# Patient Record
Sex: FEMALE | Race: WHITE | NOT HISPANIC OR LATINO | Employment: FULL TIME | URBAN - METROPOLITAN AREA
[De-identification: names, ages, dates, MRNs, and addresses within clinical notes are randomized per-mention and may not be internally consistent; named-entity substitution may affect disease eponyms.]

---

## 2017-05-26 ENCOUNTER — CONVERSION ENCOUNTER (OUTPATIENT)
Dept: MAMMOGRAPHY | Facility: CLINIC | Age: 47
End: 2017-05-26

## 2017-08-02 ENCOUNTER — ALLSCRIPTS OFFICE VISIT (OUTPATIENT)
Dept: OTHER | Facility: OTHER | Age: 47
End: 2017-08-02

## 2017-10-03 ENCOUNTER — GENERIC CONVERSION - ENCOUNTER (OUTPATIENT)
Dept: OTHER | Facility: OTHER | Age: 47
End: 2017-10-03

## 2017-12-07 ENCOUNTER — ALLSCRIPTS OFFICE VISIT (OUTPATIENT)
Dept: OTHER | Facility: OTHER | Age: 47
End: 2017-12-07

## 2017-12-10 NOTE — PROGRESS NOTES
Assessment    1  Irritable bowel syndrome with both constipation and diarrhea (564 1) (K58 2)   2  Generalized abdominal pain (789 07) (R10 84)   3  Abdominal pain, LLQ (left lower quadrant) (789 04) (R10 32)   4  Dysphagia (787 20) (R13 10)    Plan  Abdominal pain, LLQ (left lower quadrant), Dysphagia, Generalized abdominal pain,Irritable bowel syndrome with both constipation and diarrhea    · Xifaxan 550 MG Oral Tablet; TAKE 1 TABLET 3 times daily   Rx By: Loni Garg; Dispense: 14 Days ; #:42 Tablet; Refill: 1;Abdominal pain, LLQ (left lower quadrant), Dysphagia, Generalized abdominal pain, Irritable bowel syndrome with both constipation and diarrhea; MAGED = N; Sent To: [facility]/PHARMACY #[id number]   · Follow-up visit in 2 months Evaluation and Treatment  Follow-up  Status: Hold For -Scheduling  Requested for: [id number]   Ordered; Abdominal pain, LLQ (left lower quadrant), Dysphagia, Generalized abdominal pain, Irritable bowel syndrome with both constipation and diarrhea; Ordered By: Loni Garg Performed:  Due: 51LCI9552    Discussion/Summary  Discussion Summary:   She clearly has diarrhea predominant IBS, and likely also has adhesions given the difficulty with her colonoscopy in 7/2015  Also in the differential are interstitial cystitis, and endometriosis  I will continue to aggressively treat her diarrhea predominant IBS with imodium and dicyclomine as needed  I also started her on a low FODMAP diet  I gave her a prescription for rifaximin and may try Levora Nery if she remains symptomatic   asked her to call me if she develops any worsening of her symptoms or alarm symptoms, such as weight loss, change in bowel habits, bleeding, or anemia  Chief Complaint  Chief Complaint Free Text Note Form: Pt follow up for abdominal pain and diarrhea  Chief Complaint Chronic Condition [facility]: Patient is here today for follow up of chronic conditions described in HPI        History of Present Illness  HPI: She has had abdominal pain and intermittent diarrhea and constipation since age 10 and was diagnosed with IBS  She then had a cholecystectomy approx 11 years ago and she had a colonoscopy in July 2015 that was notable for being a very difficult procedure likely due to adhesions by Dr Eve Riley  She had severe pain during and after the colonoscopy, but her pain got better one day later  She has also had EGD and ERCP in the past but she believes they were both unremarkable  She has also had an unremarkable US and CT recently  her last visit, her reflux has been well controlled with dietary changes and she hasn't had to take any more Pepcid  She also complains of intermittent left lower quadrant pain associated with changes in her bowels  She continues to have 3-5 soft formed BM's per day  She feels the pain is worse at specific times her menstrual period  She's been taking probiotics, dicyclomine, imodium, but continues to have symptoms, but she has not had any bleeding or weight loss  History Reviewed: The history was obtained today from the patient and I agree with the documented history  Review of Systems  Complete-Female GI Adult:  Constitutional: No fever, no chills, feels well, no tiredness, no recent weight gain or weight loss  Eyes: No complaints of eye pain, no red eyes, no eyesight problems, no discharge, no dry eyes, no itching of eyes  ENT: no complaints of earache, no loss of hearing, no nose bleeds, no nasal discharge, no sore throat, no hoarseness  Cardiovascular: No complaints of slow heart rate, no fast heart rate, no chest pain, no palpitations, no leg claudication, no lower extremity edema  Respiratory: No complaints of shortness of breath, no wheezing, no cough, no SOB on exertion, no orthopnea, no PND  Gastrointestinal: abdominal pain-- and-- nausea, but-- no vomiting,-- no constipation,-- no diarrhea-- and-- no blood in stools    Genitourinary: No complaints of dysuria, no incontinence, no pelvic pain, no dysmenorrhea, no vaginal discharge or bleeding  Musculoskeletal: No complaints of arthralgias, no myalgias, no joint swelling or stiffness, no limb pain or swelling  Integumentary: No complaints of skin rash or lesions, no itching, no skin wounds, no breast pain or lump  Neurological: No complaints of headache, no confusion, no convulsions, no numbness, no dizziness or fainting, no tingling, no limb weakness, no difficulty walking  Psychiatric: Not suicidal, no sleep disturbance, no anxiety or depression, no change in personality, no emotional problems  Endocrine: No complaints of proptosis, no hot flashes, no muscle weakness, no deepening of the voice, no feelings of weakness  Hematologic/Lymphatic: No complaints of swollen glands, no swollen glands in the neck, does not bleed easily, does not bruise easily  ROS Reviewed:   ROS reviewed  Active Problems  1  Abdominal pain, LLQ (left lower quadrant) (789 04) (R10 32)   2  Candidiasis (112 9) (B37 9)   3  Depression (311) (F32 9)   4  Dysmenorrhea (625 3) (N94 6)   5  Dysphagia (787 20) (R13 10)   6  Edema (782 3) (R60 9)   7  Generalized abdominal pain (789 07) (R10 84)   8  History of anemia (V12 3) (Z86 2)   9  Irritable bowel syndrome with both constipation and diarrhea (564 1) (K58 2)   10  Urinary tract infection (599 0) (N39 0)    Past Medical History  1  History of Bacterial vaginosis (616 10,041 9) (N76 0,B96 89)  Active Problems And Past Medical History Reviewed: The active problems and past medical history were reviewed and updated today  Surgical History  1  History of Breast Surgery   2  History of Knee Surgery   3  History of Shoulder Surgery   4  History of Tonsillectomy  Surgical History Reviewed: The surgical history was reviewed and updated today  Family History  Family History    1  Family history of anemia (V18 2) (Z83 2)   2  Family history of cardiac disorder (V17 49) (Z82 49)   3   Family history of essential hypertension (V17 49) (Z82 49)   4  Family history of hypercholesterolemia (V18 19) (Z83 42)   5  Family history of hypertension (V17 49) (Z82 49)  Family History Reviewed: The family history was reviewed and updated today  Social History     · Denied: History of Drug use   · Never smoker   · Social alcohol use (Z78 9)  Social History Reviewed: The social history was reviewed and updated today  Current Meds   1  Aleve 220 MG Oral Capsule; Therapy: 47PMI5145 to Recorded   2  Atenolol 50 MG Oral Tablet; Therapy: 21YSW5719 to Recorded   3  ClonazePAM 0 5 MG Oral Tablet; Therapy: 79EBV2074 to (Last [facility])  Requested for: 72HEX5053 Ordered   4  Dicyclomine HCl - 10 MG Oral Capsule; TAKE 1 CAPSULE EVERY 6 HOURS DAILY; Therapy: 36Ffs6752 to (Evaluate:30Nov2017)  Requested for: 38Egb3730; Last Rx:63Mko8941 Ordered   5  Naproxen 500 MG Oral Tablet; Therapy: 83IPE9920 to Recorded   6  Probiotic Oral Capsule; Therapy: 69TYC1354 to Recorded   7  Spironolactone 100 MG Oral Tablet; Therapy: 52IHN6949 to Recorded  Medication List Reviewed: The medication list was reviewed and updated today  Allergies  1  Morphine Derivatives   2  Vicodin ES TABS    Vitals  Vital Signs    Recorded: 83FES8863 12:36PM   Temperature 98 1 F, Tympanic   Heart Rate 62   Systolic 137, LUE, Sitting   Diastolic 74, LUE, Sitting   Height 5 ft 6 in   Weight 143 lb 4 oz   BMI Calculated 23 12   BSA Calculated 1 74   O2 Saturation 96, RA       Physical Exam   Constitutional  General appearance: No acute distress, well appearing and well nourished  Eyes  Conjunctiva and lids: No swelling, erythema or discharge  Pupils and irises: Equal, round and reactive to light  Ears, Nose, Mouth, and Throat  Oropharynx: Normal with no erythema, edema, exudate or lesions  Pulmonary  Respiratory effort: No increased work of breathing or signs of respiratory distress  Auscultation of lungs: Clear to auscultation     Cardiovascular Auscultation of heart: Normal rate and rhythm, normal S1 and S2, without murmurs  Examination of extremities for edema and/or varicosities: Normal    Abdomen  Abdomen: Abnormal  -- mild tenderness left side at level of umbilicus  Liver and spleen: No hepatomegaly or splenomegaly  Lymphatic  Palpation of lymph nodes in neck: No lymphadenopathy  Musculoskeletal  Gait and station: Normal    Skin  Skin and subcutaneous tissue: Normal without rashes or lesions           Signatures   Electronically signed by : Poonam Hernandez MD; Dec  9 2017  5:31PM EST                       (Author)

## 2018-01-10 NOTE — MISCELLANEOUS
Message  GI Reminder Recall [facility]:   Date: 10/03/2017   Dear Robert Vasquez:     Review of our records shows you are due for the following: Follow Up Visit  Please call the following office to schedule your appointment:   [address], [address], [place], [place], [address] [facility] [phone]  We look forward to hearing from you! Sincerely,     [address]      Signatures   Electronically signed by :  Patrice Garcia, ; Oct  3 2017  3:27PM EST                       (Author)

## 2018-01-12 ENCOUNTER — GENERIC CONVERSION - ENCOUNTER (OUTPATIENT)
Dept: OTHER | Facility: OTHER | Age: 48
End: 2018-01-12

## 2018-01-13 VITALS
TEMPERATURE: 98.4 F | BODY MASS INDEX: 23.14 KG/M2 | SYSTOLIC BLOOD PRESSURE: 100 MMHG | HEART RATE: 61 BPM | HEIGHT: 66 IN | OXYGEN SATURATION: 98 % | WEIGHT: 144 LBS | DIASTOLIC BLOOD PRESSURE: 60 MMHG

## 2018-01-14 NOTE — MISCELLANEOUS
Provider Comments  Provider Comments:   Dear Keaton Eckert,     You missed your follow up appointment with Dr Huan Hernandez on 11/28/2016; please contact our office to reschedule at [phone]  We understand that many situations arise that occasionally prevent patients from keeping scheduled appointments  It is the policy of [facility] that patients notify us 24 hours in advance if unable to keep a scheduled appointment  Missed appointments jeopardize strong physician-patient relationships  The appointment you missed could have easily been made available to another patient if you had contacted us to cancel  We like to accommodate all of our patients, but when patients miss an appointment it prevents us from being able to help everyone  In the future, we request at least 24 hours' notice of cancellation so we can make your appointment available to someone else in need       Sincerely,  The Physicians and Staff at [facility]          Signatures   Electronically signed by : Walker Matute, ; Nov 28 2016  4:33PM EST                       (Author)

## 2018-01-23 VITALS
HEIGHT: 66 IN | WEIGHT: 143.25 LBS | OXYGEN SATURATION: 96 % | TEMPERATURE: 98.1 F | SYSTOLIC BLOOD PRESSURE: 124 MMHG | BODY MASS INDEX: 23.02 KG/M2 | HEART RATE: 62 BPM | DIASTOLIC BLOOD PRESSURE: 74 MMHG

## 2018-02-26 NOTE — MISCELLANEOUS
Message  GI Reminder Recall [employer]:   Date: 01/12/2018   Dear Gloria Huggins:     Review of our records shows you are due for the following: Follow Up Visit  Please call the following office to schedule your appointment:   [address] [facility], [address] [place] [address], [address] [place] [address] [phone]  We look forward to hearing from you! Sincerely,     [facility]      Signatures   Electronically signed by :  Salazar Atkinson, ; Jan 12 2018  2:18PM EST                       (Author)

## 2020-12-03 ENCOUNTER — OFFICE VISIT (OUTPATIENT)
Dept: OBGYN CLINIC | Facility: OTHER | Age: 50
End: 2020-12-03
Payer: COMMERCIAL

## 2020-12-03 VITALS
WEIGHT: 145 LBS | HEIGHT: 66 IN | SYSTOLIC BLOOD PRESSURE: 110 MMHG | HEART RATE: 68 BPM | DIASTOLIC BLOOD PRESSURE: 75 MMHG | BODY MASS INDEX: 23.3 KG/M2

## 2020-12-03 DIAGNOSIS — S46.119A SUBLUXATION OF TENDON OF LONG HEAD OF BICEPS: Primary | ICD-10-CM

## 2020-12-03 DIAGNOSIS — M75.20 TENDINITIS OF LONG HEAD OF BICEPS: ICD-10-CM

## 2020-12-03 PROBLEM — S43.003A SUBLUXATION OF TENDON OF LONG HEAD OF BICEPS: Status: ACTIVE | Noted: 2020-12-03

## 2020-12-03 PROCEDURE — 99203 OFFICE O/P NEW LOW 30 MIN: CPT | Performed by: ORTHOPAEDIC SURGERY

## 2020-12-03 RX ORDER — ATENOLOL 25 MG/1
12.5 TABLET ORAL
COMMUNITY
Start: 2020-11-08

## 2020-12-03 RX ORDER — DIPHENOXYLATE HYDROCHLORIDE AND ATROPINE SULFATE 2.5; .025 MG/1; MG/1
1 TABLET ORAL DAILY
COMMUNITY

## 2021-01-04 ENCOUNTER — ANESTHESIA EVENT (OUTPATIENT)
Dept: PERIOP | Facility: AMBULARY SURGERY CENTER | Age: 51
End: 2021-01-04
Payer: COMMERCIAL

## 2021-01-14 PROBLEM — F41.9 ANXIETY: Status: ACTIVE | Noted: 2021-01-14

## 2021-01-14 PROBLEM — I48.91 ATRIAL FIBRILLATION (HCC): Status: ACTIVE | Noted: 2021-01-14

## 2021-01-14 PROBLEM — R11.2 PONV (POSTOPERATIVE NAUSEA AND VOMITING): Status: ACTIVE | Noted: 2021-01-14

## 2021-01-14 PROBLEM — E78.5 HYPERLIPIDEMIA: Status: ACTIVE | Noted: 2021-01-14

## 2021-01-14 PROBLEM — Z98.890 PONV (POSTOPERATIVE NAUSEA AND VOMITING): Status: ACTIVE | Noted: 2021-01-14

## 2021-01-14 PROBLEM — I47.10 SVT (SUPRAVENTRICULAR TACHYCARDIA): Status: ACTIVE | Noted: 2021-01-14

## 2021-01-14 PROBLEM — I47.1 SVT (SUPRAVENTRICULAR TACHYCARDIA) (HCC): Status: ACTIVE | Noted: 2021-01-14

## 2021-01-14 PROBLEM — K21.9 GASTROESOPHAGEAL REFLUX DISEASE: Status: ACTIVE | Noted: 2021-01-14

## 2021-01-14 RX ORDER — PANTOPRAZOLE SODIUM 40 MG/1
40 TABLET, DELAYED RELEASE ORAL
COMMUNITY

## 2021-01-14 RX ORDER — CLONAZEPAM 0.5 MG/1
0.5 TABLET ORAL AS NEEDED
COMMUNITY

## 2021-01-14 NOTE — ANESTHESIA PREPROCEDURE EVALUATION
Procedure:  SHOULDER ARTHROSCOPY WITH ARTHROSCOPIC BICEPS TENODESIS, POSSIBLE REPAIR VERSUS DEBRIDEMENT OF ROTATOR CUFF BASED ON INTRAOPERATIVE FINDINGS (Right Shoulder)    Relevant Problems   ANESTHESIA   (+) PONV (postoperative nausea and vomiting)      CARDIO   (+) Atrial fibrillation (HCC)   (+) Hyperlipidemia   (+) SVT (supraventricular tachycardia) (HCC)   (-) Chest pain   (-) KEITA (dyspnea on exertion)      GI/HEPATIC   (+) Gastroesophageal reflux disease (well controlled)      /RENAL (within normal limits)      NEURO/PSYCH   (+) Anxiety      PULMONARY   (-) Shortness of breath   (-) Smoking   (-) URI (upper respiratory infection)      Stress echo 2019:   CONCLUSIONS    Negative exercise ECG for ischemia      Negative exercise stress echocardiography for ischemia      Patient complained of palpitations while having pacs and pvcs during the test and in recovery  Estimated functional capacity for age is normal at 12 8 METS     0/78/28 LUCILA: Systolic function is normal with an ejection fraction of 55-65%  Systolic function is normal     Most recent EKG per cardiology note is sinus mark  Physical Exam    Airway    Mallampati score: I  TM Distance: >3 FB  Neck ROM: full     Dental   No notable dental hx     Cardiovascular      Pulmonary      Other Findings        Anesthesia Plan  ASA Score- 2     Anesthesia Type- general and regional with ASA Monitors  Additional Monitors:   Airway Plan: LMA  Plan Factors-Exercise tolerance (METS): >4 METS  Chart reviewed  EKG reviewed  Induction- intravenous  Postoperative Plan-     Informed Consent- Anesthetic plan and risks discussed with patient  I personally reviewed this patient with the CRNA  Discussed and agreed on the Anesthesia Plan with the CRNA  Kyra Press Appropriately NPO, took night time atenolol, well controlled GERD, IS block preop discussed risks of bleeding/infection/nerve injury/SOB/[place]'s    GA with LMA

## 2021-01-14 NOTE — PRE-PROCEDURE INSTRUCTIONS
Pre-Surgery Instructions:   Medication Instructions    Ascorbic Acid (VITAMIN C PO) Instructed patient per Anesthesia Guidelines   atenolol (TENORMIN) 25 mg tablet Instructed to take per normal schedule    CALCIUM PO Instructed patient per Anesthesia Guidelines   clonazePAM (KlonoPIN) 0 5 mg tablet Instructed to take as needed including DOS    ELDERBERRY PO Instructed patient per Anesthesia Guidelines   multivitamin (THERAGRAN) TABS Instructed patient per Anesthesia Guidelines   pantoprazole (PROTONIX) 40 mg tablet Instructed to take per normal schedule including DOS   VITAMIN D, CHOLECALCIFEROL, PO Instructed patient per Anesthesia Guidelines  Pre op instructions per Troy Valdez protocol, medications per anesthesia guidelines and showering instructions per Troy Valdez protocol reviewed  Patient getting hibiclens  Instructed to bring brace DOS  Pt  Verbalized understanding of current visitor restrictions  Instructed to avoid all ASA/NSAIDs and OTC Vit/Supp from now until after surgery  Pt  Verbalized an understanding of all instructions reviewed and offers no concerns at this time

## 2021-01-15 ENCOUNTER — TELEPHONE (OUTPATIENT)
Dept: OTHER | Facility: OTHER | Age: 51
End: 2021-01-15

## 2021-01-15 ENCOUNTER — HOSPITAL ENCOUNTER (OUTPATIENT)
Facility: AMBULARY SURGERY CENTER | Age: 51
Setting detail: OUTPATIENT SURGERY
Discharge: HOME | End: 2021-01-15
Attending: ORTHOPAEDIC SURGERY | Admitting: ORTHOPAEDIC SURGERY
Payer: COMMERCIAL

## 2021-01-15 ENCOUNTER — ANESTHESIA (OUTPATIENT)
Dept: PERIOP | Facility: AMBULARY SURGERY CENTER | Age: 51
End: 2021-01-15
Payer: COMMERCIAL

## 2021-01-15 ENCOUNTER — TELEPHONE (OUTPATIENT)
Dept: OBGYN CLINIC | Facility: HOSPITAL | Age: 51
End: 2021-01-15

## 2021-01-15 VITALS — HEART RATE: 78 BPM

## 2021-01-15 VITALS
BODY MASS INDEX: 22.5 KG/M2 | SYSTOLIC BLOOD PRESSURE: 133 MMHG | WEIGHT: 140 LBS | RESPIRATION RATE: 18 BRPM | OXYGEN SATURATION: 97 % | DIASTOLIC BLOOD PRESSURE: 88 MMHG | TEMPERATURE: 97.9 F | HEIGHT: 66 IN | HEART RATE: 67 BPM

## 2021-01-15 DIAGNOSIS — M75.20 TENDINITIS OF LONG HEAD OF BICEPS: Primary | ICD-10-CM

## 2021-01-15 DIAGNOSIS — S46.119A SUBLUXATION OF TENDON OF LONG HEAD OF BICEPS: Primary | ICD-10-CM

## 2021-01-15 PROCEDURE — C9290 INJ, BUPIVACAINE LIPOSOME: HCPCS | Performed by: INTERNAL MEDICINE

## 2021-01-15 PROCEDURE — 29828 SHO ARTHRS SRG BICP TENODSIS: CPT | Performed by: ORTHOPAEDIC SURGERY

## 2021-01-15 PROCEDURE — C1713 ANCHOR/SCREW BN/BN,TIS/BN: HCPCS | Performed by: ORTHOPAEDIC SURGERY

## 2021-01-15 PROCEDURE — 29806 SHO ARTHRS SRG CAPSULORRAPHY: CPT | Performed by: PHYSICIAN ASSISTANT

## 2021-01-15 PROCEDURE — 29826 SHO ARTHRS SRG DECOMPRESSION: CPT | Performed by: PHYSICIAN ASSISTANT

## 2021-01-15 PROCEDURE — 29827 SHO ARTHRS SRG RT8TR CUF RPR: CPT | Performed by: ORTHOPAEDIC SURGERY

## 2021-01-15 PROCEDURE — 29828 SHO ARTHRS SRG BICP TENODSIS: CPT | Performed by: PHYSICIAN ASSISTANT

## 2021-01-15 PROCEDURE — 29806 SHO ARTHRS SRG CAPSULORRAPHY: CPT | Performed by: ORTHOPAEDIC SURGERY

## 2021-01-15 PROCEDURE — NC001 PR NO CHARGE: Performed by: ORTHOPAEDIC SURGERY

## 2021-01-15 PROCEDURE — 29826 SHO ARTHRS SRG DECOMPRESSION: CPT | Performed by: ORTHOPAEDIC SURGERY

## 2021-01-15 PROCEDURE — 29827 SHO ARTHRS SRG RT8TR CUF RPR: CPT | Performed by: PHYSICIAN ASSISTANT

## 2021-01-15 DEVICE — ANCHOR GRYPHON W ORTHOCORD 210813: Type: IMPLANTABLE DEVICE | Site: SHOULDER | Status: FUNCTIONAL

## 2021-01-15 DEVICE — DEPUY MITEK HEALIX ADVANCE 4.5 BR: Type: IMPLANTABLE DEVICE | Site: SHOULDER | Status: FUNCTIONAL

## 2021-01-15 RX ORDER — PROPOFOL 10 MG/ML
INJECTION, EMULSION INTRAVENOUS AS NEEDED
Status: DISCONTINUED | OUTPATIENT
Start: 2021-01-15 | End: 2021-01-15

## 2021-01-15 RX ORDER — MIDAZOLAM HYDROCHLORIDE 2 MG/2ML
INJECTION, SOLUTION INTRAMUSCULAR; INTRAVENOUS AS NEEDED
Status: DISCONTINUED | OUTPATIENT
Start: 2021-01-15 | End: 2021-01-15

## 2021-01-15 RX ORDER — LIDOCAINE HYDROCHLORIDE 10 MG/ML
0.5 INJECTION, SOLUTION EPIDURAL; INFILTRATION; INTRACAUDAL; PERINEURAL ONCE AS NEEDED
Status: DISCONTINUED | OUTPATIENT
Start: 2021-01-15 | End: 2021-01-15 | Stop reason: HOSPADM

## 2021-01-15 RX ORDER — ONDANSETRON 2 MG/ML
4 INJECTION INTRAMUSCULAR; INTRAVENOUS ONCE AS NEEDED
Status: DISCONTINUED | OUTPATIENT
Start: 2021-01-15 | End: 2021-01-15 | Stop reason: HOSPADM

## 2021-01-15 RX ORDER — ONDANSETRON 4 MG/1
4 TABLET, FILM COATED ORAL EVERY 8 HOURS PRN
Qty: 20 TABLET | Refills: 0 | Status: SHIPPED | OUTPATIENT
Start: 2021-01-15

## 2021-01-15 RX ORDER — ACETAMINOPHEN 325 MG/1
650 TABLET ORAL EVERY 6 HOURS PRN
Status: DISCONTINUED | OUTPATIENT
Start: 2021-01-15 | End: 2021-01-15 | Stop reason: HOSPADM

## 2021-01-15 RX ORDER — IBUPROFEN 800 MG/1
800 TABLET ORAL EVERY 8 HOURS PRN
Qty: 21 TABLET | Refills: 0 | Status: SHIPPED | OUTPATIENT
Start: 2021-01-15

## 2021-01-15 RX ORDER — SODIUM CHLORIDE, SODIUM LACTATE, POTASSIUM CHLORIDE, CALCIUM CHLORIDE 600; 310; 30; 20 MG/100ML; MG/100ML; MG/100ML; MG/100ML
125 INJECTION, SOLUTION INTRAVENOUS CONTINUOUS
Status: DISCONTINUED | OUTPATIENT
Start: 2021-01-15 | End: 2021-01-15 | Stop reason: HOSPADM

## 2021-01-15 RX ORDER — CEFAZOLIN SODIUM 2 G/50ML
2000 SOLUTION INTRAVENOUS ONCE
Status: COMPLETED | OUTPATIENT
Start: 2021-01-15 | End: 2021-01-15

## 2021-01-15 RX ORDER — OXYCODONE HYDROCHLORIDE 5 MG/1
TABLET ORAL
Qty: 13 TABLET | Refills: 0 | Status: SHIPPED | OUTPATIENT
Start: 2021-01-15 | End: 2021-01-15

## 2021-01-15 RX ORDER — DIPHENHYDRAMINE HYDROCHLORIDE 50 MG/ML
INJECTION INTRAMUSCULAR; INTRAVENOUS AS NEEDED
Status: DISCONTINUED | OUTPATIENT
Start: 2021-01-15 | End: 2021-01-15

## 2021-01-15 RX ORDER — BUPIVACAINE HYDROCHLORIDE 5 MG/ML
INJECTION, SOLUTION PERINEURAL AS NEEDED
Status: DISCONTINUED | OUTPATIENT
Start: 2021-01-15 | End: 2021-01-15

## 2021-01-15 RX ORDER — FENTANYL CITRATE/PF 50 MCG/ML
25 SYRINGE (ML) INJECTION
Status: COMPLETED | OUTPATIENT
Start: 2021-01-15 | End: 2021-01-15

## 2021-01-15 RX ORDER — COVID-19 ANTIGEN TEST
1 KIT MISCELLANEOUS 2 TIMES DAILY PRN
COMMUNITY
End: 2021-01-15

## 2021-01-15 RX ORDER — OXYCODONE HYDROCHLORIDE 5 MG/1
5 TABLET ORAL EVERY 4 HOURS PRN
Status: DISCONTINUED | OUTPATIENT
Start: 2021-01-15 | End: 2021-01-15 | Stop reason: HOSPADM

## 2021-01-15 RX ORDER — LIDOCAINE HYDROCHLORIDE 10 MG/ML
INJECTION, SOLUTION EPIDURAL; INFILTRATION; INTRACAUDAL; PERINEURAL AS NEEDED
Status: DISCONTINUED | OUTPATIENT
Start: 2021-01-15 | End: 2021-01-15

## 2021-01-15 RX ORDER — ONDANSETRON 2 MG/ML
INJECTION INTRAMUSCULAR; INTRAVENOUS AS NEEDED
Status: DISCONTINUED | OUTPATIENT
Start: 2021-01-15 | End: 2021-01-15

## 2021-01-15 RX ORDER — EPHEDRINE SULFATE 50 MG/ML
INJECTION INTRAVENOUS AS NEEDED
Status: DISCONTINUED | OUTPATIENT
Start: 2021-01-15 | End: 2021-01-15

## 2021-01-15 RX ORDER — ONDANSETRON 2 MG/ML
4 INJECTION INTRAMUSCULAR; INTRAVENOUS EVERY 6 HOURS PRN
Status: DISCONTINUED | OUTPATIENT
Start: 2021-01-15 | End: 2021-01-15 | Stop reason: HOSPADM

## 2021-01-15 RX ADMIN — SODIUM CHLORIDE, SODIUM LACTATE, POTASSIUM CHLORIDE, AND CALCIUM CHLORIDE: .6; .31; .03; .02 INJECTION, SOLUTION INTRAVENOUS at 08:42

## 2021-01-15 RX ADMIN — EPHEDRINE SULFATE 5 MG: 50 INJECTION, SOLUTION INTRAVENOUS at 07:54

## 2021-01-15 RX ADMIN — PROPOFOL 160 MG: 10 INJECTION, EMULSION INTRAVENOUS at 07:43

## 2021-01-15 RX ADMIN — FENTANYL CITRATE 25 MCG: 50 INJECTION INTRAMUSCULAR; INTRAVENOUS at 09:06

## 2021-01-15 RX ADMIN — BUPIVACAINE 20 ML: 13.3 INJECTION, SUSPENSION, LIPOSOMAL INFILTRATION at 07:22

## 2021-01-15 RX ADMIN — EPHEDRINE SULFATE 5 MG: 50 INJECTION, SOLUTION INTRAVENOUS at 08:10

## 2021-01-15 RX ADMIN — PHENYLEPHRINE HYDROCHLORIDE 50 MCG: 10 INJECTION INTRAVENOUS at 08:14

## 2021-01-15 RX ADMIN — EPHEDRINE SULFATE 5 MG: 50 INJECTION, SOLUTION INTRAVENOUS at 08:04

## 2021-01-15 RX ADMIN — CEFAZOLIN SODIUM 1000 MG: 2 SOLUTION INTRAVENOUS at 07:40

## 2021-01-15 RX ADMIN — FENTANYL CITRATE 25 MCG: 50 INJECTION INTRAMUSCULAR; INTRAVENOUS at 09:12

## 2021-01-15 RX ADMIN — PHENYLEPHRINE HYDROCHLORIDE 50 MCG: 10 INJECTION INTRAVENOUS at 08:26

## 2021-01-15 RX ADMIN — FENTANYL CITRATE 25 MCG: 50 INJECTION INTRAMUSCULAR; INTRAVENOUS at 09:17

## 2021-01-15 RX ADMIN — BUPIVACAINE HYDROCHLORIDE 6 ML: 5 INJECTION, SOLUTION PERINEURAL at 07:22

## 2021-01-15 RX ADMIN — FENTANYL CITRATE 25 MCG: 50 INJECTION INTRAMUSCULAR; INTRAVENOUS at 09:01

## 2021-01-15 RX ADMIN — SODIUM CHLORIDE, SODIUM LACTATE, POTASSIUM CHLORIDE, AND CALCIUM CHLORIDE: .6; .31; .03; .02 INJECTION, SOLUTION INTRAVENOUS at 07:35

## 2021-01-15 RX ADMIN — DIPHENHYDRAMINE HYDROCHLORIDE 25 MG: 50 INJECTION, SOLUTION INTRAMUSCULAR; INTRAVENOUS at 07:55

## 2021-01-15 RX ADMIN — LIDOCAINE HYDROCHLORIDE 50 MG: 10 INJECTION, SOLUTION EPIDURAL; INFILTRATION; INTRACAUDAL at 07:43

## 2021-01-15 RX ADMIN — ONDANSETRON 4 MG: 2 INJECTION INTRAMUSCULAR; INTRAVENOUS at 07:43

## 2021-01-15 RX ADMIN — MIDAZOLAM HYDROCHLORIDE 2 MG: 1 INJECTION, SOLUTION INTRAMUSCULAR; INTRAVENOUS at 07:21

## 2021-01-15 NOTE — H&P
I identified and marked the patient in the pre-op holding area after confirming the surgical consent  No changes to medical health since the H&P was preformed  The patient's prescription history was queried in the [place] [id number] to ensure compliance with applicable state laws  Assessment  Diagnoses and all orders for this visit:     Subluxation of tendon of long head of biceps, right shoulder     Tendinitis of long head of biceps           Discussion and Plan:     · Explained to the patient that her physical examination is consistent with instability of the long head biceps, which has been refractory to physical therapy/HEP and a cortisone injection up to this point  Her imaging does not show instability of the long-head biceps as this is a dynamic problem  and not a static problem typically seen on imaging  Given her lack of improvement with nonoperative care, the patient was offered an arthroscopic procedure  She was in agreement with this treatment and wished to proceed  She does understand the time of arthroscopy we will evaluate the other structures and repair any other pathology seen although I do not suspect there would be further pathology besides the long-head biceps  · She will be scheduled today for a right shoulder arthroscopic long head biceps tenodesis, possible rotator cuff repair vs debridement  The risks and benefits of the different possible procedures as well as the pre and post operative expectations for these procedures was explained to the patient in detail today in the office  She understood and all questions were answered  · Follow up post operatively with Kunal Laguerre PA-C     Subjective:   Patient ID: Viky Tinajero is a 45 y o  female        The patient presents with a chief complaint of right shoulder pain  The pain began 5 month(s) ago and is not associated with an acute injury    Patient does state that she began to work out and her shoulder began to bother her  The patient describes the pain as aching and dull in intensity,  intermittent, occurring with increasing frequency in timing, and localizes the pain to the  right deltoid, biceps tendon  The pain is worse with overhead work, performing rows, overuse and raising arm over head and relieved by rest, ice, avoiding the painful activities  The pain is not associated with numbness and tingling  The pain is not associated with constitutional symptoms  The patient is not awoken at night by the pain  The patient has had prior treatment with Dr Roberto Lopez in the form of a cortisone injection and formal physical therapy/HEP without benefit  She also mentions a history of an arthroscopic labral debridement in 2009 by Dr Roberto Lopez  Patient had full recovery from this procedure                      The following portions of the patient's history were reviewed and updated as appropriate: allergies, current medications, past family history, past medical history, past social history, past surgical history and problem list      Review of Systems   Constitutional: Negative for chills, fever and unexpected weight change  HENT: Negative for hearing loss, nosebleeds and sore throat  Eyes: Negative for pain, redness and visual disturbance  Respiratory: Negative for cough, shortness of breath and wheezing  Cardiovascular: Negative for chest pain, palpitations and leg swelling  Gastrointestinal: Negative for abdominal distention, nausea and vomiting  Endocrine: Negative for polydipsia and polyuria  Genitourinary: Negative for dysuria and hematuria  Skin: Negative for rash and wound  Neurological: Negative for dizziness, numbness and headaches     Psychiatric/Behavioral: Negative for decreased concentration and suicidal ideas          Objective:  /78   Pulse 68   Temp 98 9 °F (37 2 °C) (Temporal)   Resp 18   Ht 5' 6" (1 676 m)   Wt 63 5 kg (140 lb)   SpO2 97%   BMI 22 60 kg/m²            Right Shoulder Exam      Tenderness   The patient is experiencing no tenderness      Range of Motion   External rotation: 70   Forward flexion: 170   Internal rotation 0 degrees: Lumbar      Muscle Strength   Abduction: 5/5      Tests   Drop arm: negative     Other   Erythema: absent  Sensation: normal  Pulse: present     Comments:  (+) Speed's Test  Subluxation of long head biceps tendon                 Physical Exam  Vitals signs and nursing note reviewed  Constitutional:       Appearance: She is well-developed  HENT:      Head: Normocephalic and atraumatic  Eyes:      Pupils: Pupils are equal, round, and reactive to light  Neck:      Musculoskeletal: Normal range of motion and neck supple  Cardiovascular:      Rate and Rhythm: Normal rate and regular rhythm  Heart sounds: Normal heart sounds  Pulmonary:      Effort: Pulmonary effort is normal  No respiratory distress  Breath sounds: Normal breath sounds  Abdominal:      General: There is no distension  Palpations: Abdomen is soft  Skin:     General: Skin is warm and dry  Neurological:      Mental Status: She is alert and oriented to person, place, and time  Psychiatric:         Behavior: Behavior normal          Thought Content: Thought content normal          Judgment: Judgment normal                I have personally reviewed pertinent films in PACS and my interpretation is as follows      MRI Arthrogram Right Shoulder 10/12/2020:  Distal rotator cuff tendinosis, moderate subacromial bursitis    Fraying of the long head biceps tendon without full-thickness tear

## 2021-01-15 NOTE — TELEPHONE ENCOUNTER
Patient called to state that she had surgery today with Dr Roxana Oglesby and was experiencing some issues and needed to speak with someone regarding her surgery  Staff at [facility] office directed her to speak with staff at [phone] ([phone] ) who requested the patient be connected to them

## 2021-01-15 NOTE — TELEPHONE ENCOUNTER
Patient sees Dr Ruba Bailon  Patient is calling in stating that she had surgery with the Dr this morning to her right shoulder and she was given Oxycodone 5 mgs in which she is not able to take this medication due to her being allergic to this  She is currently experiencing a lot of pain and is asking if she can get another pain medication for her pain [facility] and for a call once this is completed        Call back# [phone]

## 2021-01-15 NOTE — TELEPHONE ENCOUNTER
Patient called again regarding pain medication, her block wore off and she is in extreme pain       She is allergic to Oxycodone, Percocet, Demorol, Morphine     Sent Tiger Text to [facility] on call

## 2021-01-15 NOTE — DISCHARGE INSTRUCTIONS
You are being scheduled for a shoulder arthroscopy to treat your symptoms  Below are some instructions and information on what to expect before and after your surgery  Pre-Surgical Preparation for Arthroscopic Shoulder Surgery: You will be contacted the evening prior to your surgery to confirm the scheduled time of the procedure and when to arrive at the hospital    Do not eat or drink anything after midnight the night before your surgery  Since you are having out-patient surgery, make sure that you have someone who can drive you home later in the day  Also, prepare that person for a long day, as the process of safely preparing for and recovering from the procedure is more time consuming than the actual procedure! As you will be in a sling after surgery, please wear or bring a loose fitting button-down shirt so that you can easily place this over the sling when you leave the surgical suite  This avoids having to place the operative arm in a sleeve  Most patients find that this is the easiest outfit to wear for the first week or so after surgery so you may want to plan accordingly  Most patients find that lying down in bed after shoulder surgery accentuates their discomfort  This is likely related to the effect of gravity on the swelling in the shoulder  As a result, most patients sleep better in a recliner or in bed with pillows propped up behind their back for the first few days or weeks after surgery  It is a good idea to plan for this ahead of time so there will be less hassle getting things set up the night after surgery  What to Expect After Arthroscopic Shoulder Surgery: It is normal to have swelling and discomfort in the shoulder for several days or a week after surgery  It is also normal to have a small amount of drainage from the surgical wounds (especially the first few days after surgery), as we put fluid into the shoulder to visualize the structures during surgery  It is NOT normal to have foul smelling, purulent drainage and if this is noted, please contact the office immediately or proceed to the emergency room for evaluation as this may indicate an infection  Applying ice bags to the shoulder may help with pain that is not controlled by the regional block  Ice should be applied 20-30 minutes at a time, every hour or two  Make sure to put a thin towel or T-shirt next to your skin to avoid direct contact of the ice with the skin  Icing is most helpful in the first 48 hours, although many people find that continuing past this time frame lessens their postoperative pain  Please note that your post-operative dressing is not conductive to ice, so if you need to, it is okay to remove that dressing even the night after surgery and place band-aids over the wounds in order for the ice to take effect  Pain Control    Most patients will receive a nerve block, the local anesthetic may keep your whole arm numb for up to 4 days  You will be given a prescription for narcotic pain medication when you are discharged from the hospital   With the newer nerve block that is being utilized, patients are rarely requiring the use of this narcotic pain medication  If you find you do not tolerate that type of pain medicine well, call our office and we will try another one  In addition to the narcotic pain medication, it is safe to use an anti-inflammatory (unless the patient has a medical condition that would not allow safe use of this mediation)  This includes the Advil, Motrin, Ibuprofen and Alleve category of medications  Simply follow the over the counter dosing on the package and use as indicated as another adjunct  Importantly since these medications are all very similar, use only one of them  Tylenol is a separate medication that can be utilized as well and can be taken at the same time as the other medication or given in a "staggered" manner    Just make sure that you follow the dosing on the over the counter bottle instructions  Also make sure that the pain medication prescribed by Dr [facility] team does not contain acetaminophen (this is found in Percocet and Vicodin)  Typically we do not prescribe those types of pain medications but if for some reason that has been prescribed DO NOT add more Tylenol (acetaminophen) as you could end up taking too much of that medication  As mentioned above, most patients find that lying down accentuates their discomfort  You might sleep better in a recliner, or propped up in bed  Dr Kim Rubi encourages patients to safely ambulate around the house as much as possible in the first few days after the procedure as this can help with blood circulation in the legs  While the incidence of blood clots is very rare following shoulder surgery, early ambulation is a great way to help decrease the already low rate  24 hours after the surgery you may remove the bandage and cover incisions with Band-Aids if needed  At that time you may shower, the wounds will have a surgical glue that will protect them from shower water but do not submerge your incisions directly (bathing or swimming) until at least 2 weeks post-operatively  It is safe to let the arm hang at the side and take a shower and put on a shirt without the sling on  Just make sure that you do not use the operative are to reach out and grab anything as that may damage the repair  When you are done showering and getting dressed please return the operative arm to the sling  Unless noted otherwise in your discharge paperwork, Dr Kim Rubi uses absorbable sutures so they do not need to be removed  Dr Roberto Youngblood physician assistant (PA) will see you in the office a few days after the procedure to review the intra-operative findings and to initiate physical therapy if appropriate    A post-operative appointment should have been scheduled for you already, but if for some reason this did not happen, please call the office to make one  Physical therapy is important after nearly all shoulder surgeries and a detailed rehabilitation plan based on the specific intra-operative findings and procedures will be provided to your therapist at the first post-operative office visit  Most patients have post-operative therapy appointments scheduled pre-operatively, but if you do not, that will be handled at the first post-operative office visit  Unless expressly directed otherwise it is safe to remove the sling even the first day after the surgery and let the arm hang by the side  This allows patients to shower and even put a shirt on (bad arm in the sleeve first)  It is also safe to flex and extend their wrist, hand and fingers as much as possible when the block wears off  These simple motions can serve to pump fluid out of the forearm and decrease swelling in the arm

## 2021-01-15 NOTE — ANESTHESIA PROCEDURE NOTES
Peripheral Block    Patient location during procedure: holding area  Start time: 1/15/2021 7:22 AM  Reason for block: at surgeon's request and post-op pain management  Staffing  Anesthesiologist: Jazlyn North MD  Preanesthetic Checklist  Completed: patient identified, site marked, surgical consent, pre-op evaluation, timeout performed, IV checked, risks and benefits discussed and monitors and equipment checked  Peripheral Block  Patient position: sitting  Prep: ChloraPrep  Patient monitoring: continuous pulse ox, frequent blood pressure checks and heart rate  Block type: interscalene  Laterality: right  Injection technique: single-shot  Procedures: ultrasound guided, Ultrasound guidance required for the procedure to increase accuracy and safety of medication placement and decrease risk of complications    Ultrasound permanent image saved  Needle  Needle type: Stimuplex   Needle gauge: 22 G  Needle length: 5 cm  Needle localization: anatomical landmarks and ultrasound guidance  Test dose: negative  Assessment  Injection assessment: incremental injection, local visualized surrounding nerve on ultrasound, negative aspiration for CSF, negative aspiration for heme and no paresthesia on injection  Paresthesia pain: none  Heart rate change: no  Slow fractionated injection: yes  Post-procedure:  site cleaned  patient tolerated the procedure well with no immediate complications

## 2021-01-15 NOTE — TELEPHONE ENCOUNTER
Spoke with patient  She stats having pain but didn't  oxy b/c makes her very very sick  She believes she's taken vicodin before, but feels like this may have made her sick too  She would like to try ibuprofen 800mg TID with tylenol 650mg ER 4x/day and see if that helps  Ibuprofen sent to pharmacy  Pt will call if this does not adequately control pain and we can try vicodin at that time  Plan was run by Christina Montes

## 2021-01-15 NOTE — ANESTHESIA POSTPROCEDURE EVALUATION
Post-Op Assessment Note    CV Status:  Stable  Pain Score: 0    Pain management: adequate     Mental Status:  Alert and awake   Hydration Status:  Euvolemic   PONV Controlled:  Controlled   Airway Patency:  Patent      Post Op Vitals Reviewed: Yes      Staff: CRNA         No complications documented      BP (P) 107/71 (01/15/21 0848)    Temp (!) (P) 97 2 °F (36 2 °C) (01/15/21 0848)    Pulse (P) 62 (01/15/21 0848)   Resp   15   SpO2   99%

## 2021-01-15 NOTE — OP NOTE
OPERATIVE REPORT  PATIENT NAME: Blayne Lisa    :  1970  MRN: [id number]  Pt Location: [facility] OR [facility]    SURGERY DATE: 1/15/2021     SURGEON: Monika Cota MD     ASSISTANT: Rebecca Infante PA-C     NOTE: Rebecca Infante PA-C was present throughout the entire procedure and performed essential assistance with patient prepping, draping, positioning, suture management, wound closure, sterile dressing application and sling application, all under my direct supervision  NOTE: No qualified resident physician was available for assistance    PREOPERATIVE DIAGNOSIS:  Right Shoulder Long Head Biceps Tendon Subluxation    POSTOPERATIVE DIAGNOSIS: Right Shoulder Supraspinatus Tear, Posterior Glenoid Labrum Tear and Long Head Biceps Tendon Subluxation    PROCEDURES: Surgical Arthroscopy Right Shoulder with Rotator Cuff Repair, Long Head Biceps Tenodesis, Posterior Glenoid Labrum Repair and Subacromial Decompression    ANESTHESIA STAFF: Joseph Camilo MD     ANESTHESIA TYPE: General LMA with ultrasound guided interscalene block (Exparel)  The interscalene block was provided by the anesthesia staff per my request for postoperative pain control and to decrease the use of postoperative narcotic medication for pain control  COMPLICATIONS: None    FINDINGS: Supraspinatus Tear, Posterior Glenoid Labrum Tear and Long Head Biceps Tendon Subluxation    SPECIMEN(S):  None    ESTIMATED BLOOD LOSS: Minimal    INDICATION:  Briefly, the patient is a 45 y o   female with right shoulder pain  MRI arthrogram scan confirmed a suspected partial rotator cuff tear with a clinical exam consistent with long-head biceps tendon instability  The patient elected for arthroscopic treatment after failing to improve with nonoperative care  Informed consent was obtained after a thorough discussion of the risks and benefits of the procedure, as well as alternatives to the procedure       OPERATIVE TECHNIQUE:  On the day of surgery, I identified the patients right shoulder and marked it with my initials  The patient was taken to the operating room where anesthesia was induced and 2 grams of IV Cefazolin were given  The patient was examined in the supine position and was found to have full range of motion of the right shoulder with isolated posterior instability  The posterior instability was not an expected exam finding  The patient was then positioned in the [address] [place] [address] position  All bony prominences were padded  The shoulder was prepped and draped in normal sterile fashion  After a time-out for safety, a standard posterolateral arthroscopic portal was made  Glenohumeral evaluation revealed intact glenohumeral articular cartilage with no loose bodies  There was a tear of the posterior inferior glenoid labrum at the insertion of the posterior band of the inferior glenohumeral ligament, this was felt to be responsible for the patient's instability on exam and was therefore indicated for repair  The long-head biceps was found to be unstable with disruption of bicipital pulley and tenosynovitis of the tendon in the glenohumeral joint  The subscapularis and the undersurface of supraspinatus infraspinatus had no obvious tear  The remainder of the glenoid labrum was without tear  A long-head biceps tendon tenodesis was indicated so a 4 5 mm Healix Advanced anchor was placed in the superior aspect of lesser tuberosity and a looping whipstitch was placed through around long-head biceps which was then released and tied off to the lesser tuberosity  Attention was then turned to the posterior inferior glenoid labral tear, this was elevated and a single 3 0 mm Gryphon anchor was placed at the posterior inferior glenoid and a simple stitch placed around the labrum tying this off and opposing the labrum and posterior band of the inferior glenohumeral ligament anatomically to the glenoid without over tensioning     After the intra-articular work was completed, the scope was then placed in the subacromial space through a posterolateral portal where a thorough bursectomy was performed  The bursal side of the supraspinatus had a high-grade near full-thickness longitudinal type split tear secondary to impingement on the CA ligament, this tear was slightly more posterior than our standard tear and was found to traverse the entire thickness of the tendon, there was capsule still remaining on the articular side which was the reason it was not visible on the articular side on arthroscopy or the MRI arthrogram   This supraspinatus tear was indicated for repair  The tuberosity was prepared in routine fashion and a single 4 5 mm double-armed helix advanced anchor was placed at the tuberosity and then 2 horizontal mattress stitches were placed across the longitudinal tear and tied down reducing the split and tendon to the tuberosity with anatomic tension  There was still some fraying of the bursal side which was debrided to a stable border  There was reciprocal fraying and excoriation on the CA ligament so a formal subacromial decompression was indicated  The CA ligament was released off the anterolateral edge of acromion and a gentle acromioplasty was performed  The area was then irrigated  Scope was withdrawn  Wounds were closed with 4-0 Monocryl and Histoacryl  Sterile dressings and a sling with an abduction pillow was placed  The patient was awoken without complication and returned to the recovery room in good condition  We will see the patient back in the office next week to initiate therapy following accelerated rotator cuff repair rehabilitation protocol  At the end of procedure, the counts were correct       PATIENT DISPOSITION:  Stable to PACU      SIGNATURE: Parris Cohen MD  DATE: January 15, 2021  TIME: 8:46 AM

## 2021-01-16 ENCOUNTER — TELEPHONE (OUTPATIENT)
Dept: OTHER | Facility: HOSPITAL | Age: 51
End: 2021-01-16

## 2021-01-16 ENCOUNTER — TELEPHONE (OUTPATIENT)
Dept: OTHER | Facility: OTHER | Age: 51
End: 2021-01-16

## 2021-01-16 NOTE — TELEPHONE ENCOUNTER
Msg: Pt calling b/c they had rotator cuff surgery yesterday and they now cannot feel anything from their elbow to their fingers   Pt would like to know if this is normal post-op

## 2021-01-16 NOTE — TELEPHONE ENCOUNTER
[phone]/ [facility] calling she had a nerve block procedure yesterday by Dr Swathi Hawk; pt called because she states the anesthetic wore out and she's in excruciating pain she can't tolerate

## 2021-01-18 ENCOUNTER — OFFICE VISIT (OUTPATIENT)
Dept: OBGYN CLINIC | Facility: OTHER | Age: 51
End: 2021-01-18

## 2021-01-18 ENCOUNTER — EVALUATION (OUTPATIENT)
Dept: PHYSICAL THERAPY | Facility: CLINIC | Age: 51
End: 2021-01-18
Payer: COMMERCIAL

## 2021-01-18 VITALS
WEIGHT: 145 LBS | DIASTOLIC BLOOD PRESSURE: 78 MMHG | SYSTOLIC BLOOD PRESSURE: 114 MMHG | HEART RATE: 66 BPM | BODY MASS INDEX: 23.4 KG/M2

## 2021-01-18 DIAGNOSIS — S46.119A SUBLUXATION OF TENDON OF LONG HEAD OF BICEPS: ICD-10-CM

## 2021-01-18 DIAGNOSIS — M25.512 ACUTE PAIN OF LEFT SHOULDER: ICD-10-CM

## 2021-01-18 DIAGNOSIS — Z47.89 AFTERCARE FOLLOWING SURGERY OF THE MUSCULOSKELETAL SYSTEM: Primary | ICD-10-CM

## 2021-01-18 DIAGNOSIS — Z47.89 ORTHOPEDIC AFTERCARE: Primary | ICD-10-CM

## 2021-01-18 PROCEDURE — 97110 THERAPEUTIC EXERCISES: CPT | Performed by: PHYSICAL THERAPIST

## 2021-01-18 PROCEDURE — 97161 PT EVAL LOW COMPLEX 20 MIN: CPT | Performed by: PHYSICAL THERAPIST

## 2021-01-18 PROCEDURE — 99024 POSTOP FOLLOW-UP VISIT: CPT | Performed by: PHYSICIAN ASSISTANT

## 2021-01-18 RX ORDER — OXYCODONE HYDROCHLORIDE 5 MG/1
TABLET ORAL
COMMUNITY
Start: 2021-01-15 | End: 2021-05-03 | Stop reason: ALTCHOICE

## 2021-01-18 RX ORDER — METFORMIN HYDROCHLORIDE 500 MG/1
TABLET, EXTENDED RELEASE ORAL
COMMUNITY
Start: 2020-11-17

## 2021-01-18 NOTE — PROGRESS NOTES
PT Evaluation     Today's date: 2021  Patient name: Miya Fernandez  : 1970  MRN: [id number]  Referring provider: Shaw Burroughs MD  Dx:   Encounter Diagnosis     ICD-10-CM    1  Orthopedic aftercare  Z47 89    2  Subluxation of tendon of long head of biceps, right shoulder  S46 119A Ambulatory referral to Physical Therapy   3  Acute pain of left shoulder  M25 512        Start Time: 1530  Stop Time: 1615  Total time in clinic (min): 45 minutes    Assessment  Assessment details: Patient is s/p 3 day(s)  right rotator cuff repair and biceps tenodesis on 1/15/21 with Dr Kim Rubi  Patient is doing well post operatively  Pt is correctly fitted in abduction sling  Incisions are clean and dry with no visible signs of infection  Pt is neurovascularly intact at distal UE  Acceptable ROM noted at elbow, wrist, hand  ROM note tested at shoulder given protocol guidelines  This will be assessed next visit  Patient would benefit from skilled PT at this time to improve function, reduce pain, increase ROM, and return to premorbid status  Impairments: abnormal muscle tone, abnormal or restricted ROM, abnormal movement and impaired physical strength  Understanding of Dx/Px/POC: good   Prognosis: good    Goals  Short Term Goals: to be achieved by 4 weeks  1) Patient to be independent with basic HEP  2) Decrease pain to 1 and 3/10 at its worst   3) Increase shoulder PROM by 5-10 degrees     Long Term Goals: to be achieved by discharge  1) FOTO equal to or greater than expected  2) Patient to be independent with comprehensive HEP  3) Abolish pain for improved quality of life  4) Increase shoulder ROM to within functional limits to improve a/iadls  5) Increase shoulder strength to 5/5 MMT grade in all planes to improve a/iadls  6) Patient to return to full duty at work       Plan  Patient would benefit from: skilled PT  Planned modality interventions: cryotherapy and TENS  Planned therapy interventions: manual therapy, neuromuscular re-education, patient education, therapeutic activities, therapeutic exercise and functional ROM exercises  Frequency: 2-3x per week for 4-6 weeks  Treatment plan discussed with: patient        Subjective Evaluation    History of Present Illness  Mechanism of injury: surgery  Mechanism of injury: History of Current Injury: 3 days s/p Surgical Arthroscopy Right Shoulder with Rotator Cuff Repair, Long Head Biceps Tenodesis, Posterior Glenoid Labrum Repair and Subacromial Decompression on 1/15/21  Pt believes her shoulder pain started after a fall at bone [employer]  Pt has a previous history of neck pain which is now aggravated  12 years ago, patient had bilateral labral repairs  Pain location/Descriptors: posterior shoulder   Special Questions: Mild numbness in hand   Patient goals:  Return to active lifestyle  Hobbies/Interest: goes to the gym to exercise, jason  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, computer work  Pt reacts to the cold and can break out in hives with ice  Pain  Current pain ratin  At best pain ratin  At worst pain rating: 10    Hand dominance: right    Treatments  No previous or current treatments  Patient Goals  Patient goals for therapy: decreased pain, increased motion, increased strength, independence with ADLs/IADLs, return to sport/leisure activities and return to work          Objective     Observations   Left Shoulder   Positive for incision       Additional Observation Details  Incision intact with no observable sign of infection    Palpation     Additional Palpation Details  Tenderness around entire shoulder girdle of R shoulder    Active Range of Motion     Right Wrist   Normal active range of motion    Additional Active Range of Motion Details  Normal right wrist AROM    Passive Range of Motion     Right Elbow   Flexion: 140 degrees   Extension: 0 degrees     Additional Passive Range of Motion Details  Not tested today Strength/Myotome Testing     Right Wrist/Hand   Wrist extension: 4  Wrist flexion: 4    Additional Strength Details   strength:   Right- 50#  Left- 65#    General Comments:      Shoulder Comments   Pt is neurovascularly intact in RUE             Precautions:   Surgery: Surgical Arthroscopy Right Shoulder with Rotator Cuff Repair, Long Head Biceps Tenodesis, Posterior Glenoid Labrum Repair and Subacromial DecompressionSurgical Arthroscopy Right Shoulder with Rotator Cuff Repair, Long Head Biceps Tenodesis, Posterior Glenoid Labrum Repair and Subacromial Decompression on 1/15/21  *Pt is to follow the accelerated RTC repair protocol  Manuals 1/18            Shoulder stretching-Protocol                                                    Neuro Re-Ed             Scapular retraction 20x                                                                                          Ther Ex             Pendulums 3'            UT stretch 5x10" B            T/S extension             Elbow self PROM 10x            Towel   20x                                                   Ther Activity                                       Gait Training                                       Modalities                                           Access Code: [email or web address]   Patient Portal:  [email or web address]

## 2021-01-18 NOTE — PROGRESS NOTES
Assessment:       1  Aftercare following surgery of the musculoskeletal system          Plan:        Patient is doing well postoperatively  She is scheduled for PT this week  Operative note, images, and  rehab protocol were discussed  All questions were addressed/answered to the patient's satisfaction  We discussed that her thumb motion is likely related to her anesthetic block  I encouraged her to work on range of motion for her hand, fingers, and wrists in addition to PT exercises  Follow-up is in 6 weeks to assess patient's progress  Subjective:     Patient ID: Mckenzie Herring is a 45 y o  female  Chief Complaint:    HPI    Patient presents for follow-up status post right shoulder arthroscopy with rotator cuff repair, labral repair, and biceps tenodesis on 1/15/2021  Pain is controlled  Patient has residual paresthesia in right thumb and index finger following anesthetic block  Social History     Occupational History    Not on file   Tobacco Use    Smoking status: Never Smoker    Smokeless tobacco: Never Used   Substance and Sexual Activity    Alcohol use: Not Currently     Frequency: Never    Drug use: Never    Sexual activity: Not on file      Review of Systems   Constitutional: Negative  Respiratory: Negative  Musculoskeletal: Positive for myalgias  Negative for arthralgias  Skin: Positive for wound  Neurological: Positive for weakness and numbness  Psychiatric/Behavioral: Negative  Objective:     Ortho ExamPhysical Exam  HENT:      Head: Atraumatic  Cardiovascular:      Pulses: Normal pulses  Pulmonary:      Effort: Pulmonary effort is normal    Musculoskeletal:         General: No swelling  Comments: Arm in sling  Shoulder range of motion and strength not tested  Right thumb appears more swollen than left thumb at the IP joint and range of motion is limited by inflammation, but not painful  Skin:     General: Skin is warm and dry        Capillary Refill: Capillary refill takes less than 2 seconds  Coloration: Skin is jaundiced  Comments: Incisions dry and clean   Neurological:      Mental Status: She is alert and oriented to person, place, and time  Sensory: Sensory deficit present     Psychiatric:         Mood and Affect: Mood normal          Behavior: Behavior normal

## 2021-01-18 NOTE — LETTER
January 18, 2021     Patient: Renetta López   YOB: 1970   Date of Visit: 1/18/2021       To Whom it May Concern:    Karishma Elviss is under my professional care  She was seen in my office on 1/18/2021  She is cleared for desk work starting 1/25/2021 but recommend that she refrain from driving until 7/17/4752  If you have any questions or concerns, please don't hesitate to call           Sincerely,          Jose Raul Andrade PA-C        CC: No Recipients

## 2021-01-21 ENCOUNTER — OFFICE VISIT (OUTPATIENT)
Dept: PHYSICAL THERAPY | Facility: CLINIC | Age: 51
End: 2021-01-21
Payer: COMMERCIAL

## 2021-01-21 DIAGNOSIS — S46.119A SUBLUXATION OF TENDON OF LONG HEAD OF BICEPS: ICD-10-CM

## 2021-01-21 DIAGNOSIS — Z47.89 ORTHOPEDIC AFTERCARE: Primary | ICD-10-CM

## 2021-01-21 DIAGNOSIS — M25.512 ACUTE PAIN OF LEFT SHOULDER: ICD-10-CM

## 2021-01-21 PROCEDURE — 97112 NEUROMUSCULAR REEDUCATION: CPT | Performed by: PHYSICAL THERAPIST

## 2021-01-21 PROCEDURE — 97110 THERAPEUTIC EXERCISES: CPT | Performed by: PHYSICAL THERAPIST

## 2021-01-21 PROCEDURE — 97140 MANUAL THERAPY 1/> REGIONS: CPT | Performed by: PHYSICAL THERAPIST

## 2021-01-21 NOTE — PROGRESS NOTES
Daily Note     Today's date: 2021  Patient name: Hugo Woodruff  : 1970  MRN: [id number]  Referring provider: Cora Simon MD  Dx:   Encounter Diagnosis     ICD-10-CM    1  Orthopedic aftercare  Z47 89    2  Acute pain of left shoulder  M25 512    3  Subluxation of tendon of long head of biceps, right shoulder  S46 119A        Start Time: 1530  Stop Time: 1615  Total time in clinic (min): 45 minutes    Subjective: Pt is sleeping in lounge chair but is not getting great night of sleep  Pt has been compliant with HEP  Objective: See treatment diary below  PROM:   Flexion: 90 degrees  ER neutral: 25 deg  IR neutral: 35 deg     Assessment: Pt did not tolerate passive stretching well  She was very guarded throughout entire manual treatment  Scapular mobilizations significantly decreased guarding  Pt requires MH to rhomboids to allow more fluid PROM without muscle guarding  Performed scapular AROM to help facilitate muscle relaxation  Patient would benefit from continued PT  Plan: Continue per plan of care  Precautions:   Surgery: Surgical Arthroscopy Right Shoulder with Rotator Cuff Repair, Long Head Biceps Tenodesis, Posterior Glenoid Labrum Repair and Subacromial DecompressionSurgical Arthroscopy Right Shoulder with Rotator Cuff Repair, Long Head Biceps Tenodesis, Posterior Glenoid Labrum Repair and Subacromial Decompression on 1/15/21  *Pt is to follow the accelerated RTC repair protocol         Manuals            Shoulder stretching-Protocol  15' (MH needed on scap)           Right Scapular mobs  8' GrII                                     Neuro Re-Ed             Scapular retraction 20x 20x           Scapular elevation/depression  15x                                                                            Ther Ex             Pendulums 3' 3'           UT stretch 5x10" B 5x10"           T/S extension             Elbow self PROM 10x 10x           Towel   20x Ther Activity                                       Gait Training                                       Modalities                                       1:1 with -038q

## 2021-01-25 ENCOUNTER — TELEPHONE (OUTPATIENT)
Dept: OBGYN CLINIC | Facility: HOSPITAL | Age: 51
End: 2021-01-25

## 2021-01-25 ENCOUNTER — APPOINTMENT (OUTPATIENT)
Dept: PHYSICAL THERAPY | Facility: CLINIC | Age: 51
End: 2021-01-25
Payer: COMMERCIAL

## 2021-01-25 NOTE — TELEPHONE ENCOUNTER
Patient had sx on 1/15/21 & is stating that she is having a lot of pain in her back, she states it is where the "trace minor or major is" & then explained as hear the scapula  She is also stating that her spine is also sore  Patient is having trouble sleeping & she would like to know what she can do for this pain       [phone]

## 2021-01-25 NOTE — TELEPHONE ENCOUNTER
We operated on the shoulder, so scapular discomfort is usually sign of neck irritation  Sling is for comfort only  She can come out of the sling for elbow wrist and hand range of motion  If she is sitting, she can be out of the sling propped on pillows  This can sometimes help with her complaints  Back/spine pain is not attributed to her shoulder and is likely positional from sleep  Therapy will be helpful for all of these complaints      Tylenol 1000 mg every 8 hours  Ibuprofen 600 mg every 6 hours  Ice for shoulder   Heat for back

## 2021-01-25 NOTE — TELEPHONE ENCOUNTER
Spoke to patient  Above advice given  Patient stated she does have neck issues  Asked to see a doctor for her neck pain  Patient is scheduled with Dr Joy Garcia for a neck/back work up  Patient stated she will use the sling only when she feels like she needs it  Advised that the sling could be pulling in a strange way leading to neck pain as well  Encouraged OTC medications and ice/heat as recommended  Verbalized understanding

## 2021-01-26 ENCOUNTER — OFFICE VISIT (OUTPATIENT)
Dept: PHYSICAL THERAPY | Facility: CLINIC | Age: 51
End: 2021-01-26
Payer: COMMERCIAL

## 2021-01-26 DIAGNOSIS — M54.2 NECK PAIN: ICD-10-CM

## 2021-01-26 DIAGNOSIS — M25.512 ACUTE PAIN OF LEFT SHOULDER: ICD-10-CM

## 2021-01-26 DIAGNOSIS — Z47.89 ORTHOPEDIC AFTERCARE: Primary | ICD-10-CM

## 2021-01-26 DIAGNOSIS — S46.119A SUBLUXATION OF TENDON OF LONG HEAD OF BICEPS: ICD-10-CM

## 2021-01-26 PROCEDURE — 97161 PT EVAL LOW COMPLEX 20 MIN: CPT | Performed by: PHYSICAL THERAPIST

## 2021-01-26 PROCEDURE — 97140 MANUAL THERAPY 1/> REGIONS: CPT | Performed by: PHYSICAL THERAPIST

## 2021-01-26 PROCEDURE — 97110 THERAPEUTIC EXERCISES: CPT | Performed by: PHYSICAL THERAPIST

## 2021-01-26 NOTE — PROGRESS NOTES
Daily Note -Shoulder/ Eval- Cervical  Spine    Today's date: 2021  Patient name: Cody Frey  : 1970  MRN: [id number]  Referring provider: Gopal Naik MD  Dx:   Encounter Diagnosis     ICD-10-CM    1  Orthopedic aftercare  Z47 89    2  Acute pain of left shoulder  M25 512    3  Subluxation of tendon of long head of biceps, right shoulder  S46 119A    4  Neck pain  M54 2        Start Time: 9331  Stop Time: 1600  Total time in clinic (min): 75 minutes    Subjective: Pt complains of chronic neck pain that has been exacerbated since having shoulder surgery  She notes that is preventing her from sleeping and she is very uncomfortable with stretching  Pain is most located in posterior scapular region of the right shoulder and can reach a 10/10  At rest, symptoms are minimal  She also notes intermittent numbness/tingling in the UE depending on movement of the arm  Objective: See treatment diary below    Cervical ROM:  Flexion: 65 degrees  Extension: 80 degrees (neck pain)   R rotation: 90 degrees  L Rotation: 75 degrees (neck pain)    Joint mobility assessment:   Left UPA: hypomobility and discomfort from C7 to to T3    Thoracic CPA: Hypomobile     Posture: increased thoracic kyphosis and     Tenderness: Very tender to palpation to right scapular     Special tests:   Cervical Traction: alleviates symptoms  Spurlings-A: negative  Surplings-B: negative  Quadrant testing: pain in cervical region B (extension and lateral flexion)     Assessment: Pt's right scapular tenderness seems to be related to muscle guarding of the entire right shoulder girdle  This pain has been chronic in nature and even present prior to shoulder surgery  Cervical spine ROM did reproduce pain in the cervical spine but not comparable pain at right scapular border  This pain was reproduced to direct palpation to muscles around the scapula on the right side   Pt unable to tolerate PROM of the shoulder without direct pressure on the scapular region  She exhibits tremendous muscle guarding secondary to the discomfort in the region  Segmental hypomobility also present to lower cervical and upper thoracic spine  Pt will benefit from treatment directed to the cervicothoracic spine to maximize function and prevent muscle guarding during stretching of the shoulder  Pt will be progressed according to Dr Arrieta Call protocol and as she tolerates it  Goals:   Improve cervical ROM to full and pain free  Reduce comparable sign to 5/10 at worst       Plan: Continue per plan of care  Continue treatment for cervical and thoracic spine  Precautions:   Surgery: Surgical Arthroscopy Right Shoulder with Rotator Cuff Repair, Long Head Biceps Tenodesis, Posterior Glenoid Labrum Repair and Subacromial DecompressionSurgical Arthroscopy Right Shoulder with Rotator Cuff Repair, Long Head Biceps Tenodesis, Posterior Glenoid Labrum Repair and Subacromial Decompression on 1/15/21  *Pt is to follow the accelerated RTC repair protocol         Manuals 1/18 1/21 1/26          Shoulder stretching-Protocol  15' (MH needed on scap) 8'          Right Scapular mobs  8' GrII 3'          STM to right UT and periscapular region   cupping and STM 8'           CPA of T/S   Gr III 4' focus on upper thoracic region          L UPA of C6,7    Gr III 3'                                    Neuro Re-Ed             Scapular retraction 20x 20x 30x          Scapular elevation/depression  15x                                                                            Ther Ex             Pendulums 3' 3' 5'          UT stretch 5x10" B 5x10" 5x10"          T/S extension             Elbow self PROM 10x 10x 30x          Towel   20x            T/S decompression on FR   5'                                    Ther Activity                                       Gait Training                                       Modalities                                       1:1 with -4pm  Pt's cervicothoracic spine was evaluated in today's session x30 minutes

## 2021-01-28 ENCOUNTER — APPOINTMENT (OUTPATIENT)
Dept: PHYSICAL THERAPY | Facility: CLINIC | Age: 51
End: 2021-01-28
Payer: COMMERCIAL

## 2021-01-29 ENCOUNTER — OFFICE VISIT (OUTPATIENT)
Dept: PHYSICAL THERAPY | Facility: CLINIC | Age: 51
End: 2021-01-29
Payer: COMMERCIAL

## 2021-01-29 DIAGNOSIS — S46.119A SUBLUXATION OF TENDON OF LONG HEAD OF BICEPS: ICD-10-CM

## 2021-01-29 DIAGNOSIS — M54.2 NECK PAIN: ICD-10-CM

## 2021-01-29 DIAGNOSIS — M25.512 ACUTE PAIN OF LEFT SHOULDER: ICD-10-CM

## 2021-01-29 DIAGNOSIS — Z47.89 ORTHOPEDIC AFTERCARE: Primary | ICD-10-CM

## 2021-01-29 PROCEDURE — 97110 THERAPEUTIC EXERCISES: CPT | Performed by: PHYSICAL THERAPIST

## 2021-01-29 PROCEDURE — 97140 MANUAL THERAPY 1/> REGIONS: CPT | Performed by: PHYSICAL THERAPIST

## 2021-01-29 NOTE — PROGRESS NOTES
Daily Note     Today's date: 2021  Patient name: Guillermo Cade  : 1970  MRN: [id number]  Referring provider: Nicol Ray MD  Dx:   Encounter Diagnosis     ICD-10-CM    1  Orthopedic aftercare  Z47 89    2  Acute pain of left shoulder  M25 512    3  Neck pain  M54 2    4  Subluxation of tendon of long head of biceps, right shoulder  S46 119A        Start Time: 0945  Stop Time: 1030  Total time in clinic (min): 45 minutes    Subjective: Pt reports feeling great after last PT session but did not get much sleep last night due to pain  Pt still having right scapular region pain, not anterior shoulder pain  Objective: See treatment diary below      Assessment: Tolerance to PROM and stretching much improved from previous session  We did work on T/S joint mobilizations and periscapular STM prior to manual stretching of the right shoulder, which was very effective compared to previous session  Passive shoulder flexion reached nearly 125 degrees  Tolerated treatment well  Patient would benefit from continued PT      Plan: Continue per plan of care  Precautions:   Surgery: Surgical Arthroscopy Right Shoulder with Rotator Cuff Repair, Long Head Biceps Tenodesis, Posterior Glenoid Labrum Repair and Subacromial DecompressionSurgical Arthroscopy Right Shoulder with Rotator Cuff Repair, Long Head Biceps Tenodesis, Posterior Glenoid Labrum Repair and Subacromial Decompression on 1/15/21  *Pt is to follow the accelerated RTC repair protocol         Manuals          Shoulder stretching-Protocol  15' (MH needed on scap) 8' 8'         Right Scapular mobs  8' GrII 3'          STM to right UT and periscapular region   cupping and STM 8'  STM 8'         CPA of T/S   Gr III 4' focus on upper thoracic region 7' Gr III          L UPA of C6,7    Gr III 3'                                    Neuro Re-Ed             Scapular retraction 20x 20x 30x 30x         Scapular elevation/depression  15x Ther Ex             Pendulums 3' 3' 5' 5'         UT stretch 5x10" B 5x10" 5x10" 5x10"         T/S extension             Elbow self PROM 10x 10x 30x 30x         Towel   20x            T/S decompression on FR   5'                                    Ther Activity                                       Gait Training                                       Modalities                                       1:1 with -1832

## 2021-02-02 ENCOUNTER — APPOINTMENT (OUTPATIENT)
Dept: PHYSICAL THERAPY | Facility: CLINIC | Age: 51
End: 2021-02-02
Payer: COMMERCIAL

## 2021-02-04 ENCOUNTER — OFFICE VISIT (OUTPATIENT)
Dept: PHYSICAL THERAPY | Facility: CLINIC | Age: 51
End: 2021-02-04
Payer: COMMERCIAL

## 2021-02-04 DIAGNOSIS — M54.2 NECK PAIN: ICD-10-CM

## 2021-02-04 DIAGNOSIS — M25.512 ACUTE PAIN OF LEFT SHOULDER: ICD-10-CM

## 2021-02-04 DIAGNOSIS — Z47.89 ORTHOPEDIC AFTERCARE: Primary | ICD-10-CM

## 2021-02-04 DIAGNOSIS — S46.119A SUBLUXATION OF TENDON OF LONG HEAD OF BICEPS: ICD-10-CM

## 2021-02-04 PROCEDURE — 97110 THERAPEUTIC EXERCISES: CPT | Performed by: PHYSICAL THERAPIST

## 2021-02-04 PROCEDURE — 97112 NEUROMUSCULAR REEDUCATION: CPT | Performed by: PHYSICAL THERAPIST

## 2021-02-04 PROCEDURE — 97140 MANUAL THERAPY 1/> REGIONS: CPT | Performed by: PHYSICAL THERAPIST

## 2021-02-04 NOTE — PROGRESS NOTES
Daily Note     Today's date: 2021  Patient name: Adonay Beaz  : 1970  MRN: [id number]  Referring provider: Madison Vargas MD  Dx:   Encounter Diagnosis     ICD-10-CM    1  Orthopedic aftercare  Z47 89    2  Acute pain of left shoulder  M25 512    3  Neck pain  M54 2    4  Subluxation of tendon of long head of biceps, right shoulder  S46 119A        Start Time: 1220  Stop Time: 1305  Total time in clinic (min): 45 minutes    Subjective: Pt reports left shoulder pain due to shoveling the snow with her left arm  She kept right arm in sling close to body  She is going to see Dr Isiah Morrow for an evaluation of her neck  Objective: See treatment diary below      Assessment: Pt continues to have difficulty tolerating PROM due to pain in the scapular region  She guards throughout the range and has a very protective seated posture  She has demonstrated an improved tolerance to stretching compared to last week  Pt is 3 weeks post op and should continue HEP 3x per day to prevent shoulder stiffness (pendulums, scap retractions, elbow AAROM)  Continued to address thoracic mobility with   Patient would benefit from continued PT  Plan: Continue per plan of care  Precautions:   Surgery: Surgical Arthroscopy Right Shoulder with Rotator Cuff Repair, Long Head Biceps Tenodesis, Posterior Glenoid Labrum Repair and Subacromial DecompressionSurgical Arthroscopy Right Shoulder with Rotator Cuff Repair, Long Head Biceps Tenodesis, Posterior Glenoid Labrum Repair and Subacromial Decompression on 1/15/21  *Pt is to follow the accelerated RTC repair protocol     *D/C sling at 4 weeks    Manuals         Shoulder stretching-Protocol  15' (MH needed on scap) 8' 8' 9'        Right Scapular mobs  8' GrII 3'  Gr III 3'        STM to right UT and periscapular region   cupping and STM 8'  STM 8' STM 6'        CPA of T/S   Gr III 4' focus on upper thoracic region 7' Gr III  5' grIII        L UPA of C6,7    Gr III 3'                                    Neuro Re-Ed             Scapular retraction 20x 20x 30x 30x 30x sidelying        Scapular elevation/depression  15x   30x sidelying                                                                         Ther Ex             Pendulums 3' 3' 5' 5' 5'        UT stretch 5x10" B 5x10" 5x10" 5x10" 5x10"        LS stretch     5x10"        T/S extension             Elbow self PROM 10x 10x 30x 30x         Towel   20x            T/S decompression on FR   5'                                    Ther Activity                                       Gait Training                                       Modalities                                       1:1 with PT 1220-105PM

## 2021-02-05 ENCOUNTER — OFFICE VISIT (OUTPATIENT)
Dept: PHYSICAL THERAPY | Facility: CLINIC | Age: 51
End: 2021-02-05
Payer: COMMERCIAL

## 2021-02-05 DIAGNOSIS — Z47.89 ORTHOPEDIC AFTERCARE: Primary | ICD-10-CM

## 2021-02-05 DIAGNOSIS — M25.512 ACUTE PAIN OF LEFT SHOULDER: ICD-10-CM

## 2021-02-05 DIAGNOSIS — M54.2 NECK PAIN: ICD-10-CM

## 2021-02-05 PROCEDURE — 97110 THERAPEUTIC EXERCISES: CPT | Performed by: PHYSICAL THERAPIST

## 2021-02-05 PROCEDURE — 97140 MANUAL THERAPY 1/> REGIONS: CPT | Performed by: PHYSICAL THERAPIST

## 2021-02-05 PROCEDURE — 97112 NEUROMUSCULAR REEDUCATION: CPT | Performed by: PHYSICAL THERAPIST

## 2021-02-05 NOTE — PROGRESS NOTES
Daily Note     Today's date: 2021  Patient name: Guillermo Cade  : 1970  MRN: [id number]  Referring provider: Nicol Ray MD  Dx:   Encounter Diagnosis     ICD-10-CM    1  Orthopedic aftercare  Z47 89    2  Acute pain of left shoulder  M25 512    3  Neck pain  M54 2        Start Time: 4447  Stop Time: 0410  Total time in clinic (min): 45 minutes    Subjective: Pt notes shoulder is feeling better  Posterior aspect shoulder pain continues although not as severe  Objective: See treatment diary below      Assessment: Pt much more comfortable during stretching and PROM  Mild muscle guarding but no severe spasms at scapular  Soft tissue extensibility and trigger points improved at left upper trapezius  Tolerated treatment well  Patient would benefit from continued PT  Once patient enters in 4 weeks, we will initiated AAROM and d/c the sling as per protocol  Encouraged HEP compliance over the weekend to reduce shoulder stiffness and prevent adhesions  Plan: Continue per plan of care  Precautions:   Surgery: Surgical Arthroscopy Right Shoulder with Rotator Cuff Repair, Long Head Biceps Tenodesis, Posterior Glenoid Labrum Repair and Subacromial DecompressionSurgical Arthroscopy Right Shoulder with Rotator Cuff Repair, Long Head Biceps Tenodesis, Posterior Glenoid Labrum Repair and Subacromial Decompression on 1/15/21  *Pt is to follow the accelerated RTC repair protocol     *D/C sling at 4 weeks    Manuals        Shoulder stretching-Protocol  15' (MH needed on scap) 8' 8' 9' 9'       Right Scapular mobs  8' GrII 3'  Gr III 3' Gr III 3'       STM to right UT and periscapular region   cupping and STM 8'  STM 8' STM 6' STM 6'       CPA of T/S   Gr III 4' focus on upper thoracic region 7' Gr III  5' grIII        L UPA of C6,7    Gr III 3'                                    Neuro Re-Ed             Scapular retraction 20x 20x 30x 30x 30x sidelying 30x sidelying Scapular elevation/depression  15x   30x sidelying 30x sidelying                                                                        Ther Ex             Pendulums 3' 3' 5' 5' 5' 5'       UT stretch 5x10" B 5x10" 5x10" 5x10" 5x10" 5x10"       LS stretch     5x10" 5x10"       T/S extension             Elbow self PROM 10x 10x 30x 30x 30x 30x       Towel   20x            T/S decompression on FR   5'          Self PROM ER/IR       20x                    Ther Activity                                       Gait Training                                       Modalities                                       1:1 with -916p

## 2021-02-09 ENCOUNTER — OFFICE VISIT (OUTPATIENT)
Dept: PHYSICAL THERAPY | Facility: CLINIC | Age: 51
End: 2021-02-09
Payer: COMMERCIAL

## 2021-02-09 DIAGNOSIS — S46.119A SUBLUXATION OF TENDON OF LONG HEAD OF BICEPS: ICD-10-CM

## 2021-02-09 DIAGNOSIS — Z47.89 ORTHOPEDIC AFTERCARE: Primary | ICD-10-CM

## 2021-02-09 DIAGNOSIS — M54.2 NECK PAIN: ICD-10-CM

## 2021-02-09 DIAGNOSIS — M25.512 ACUTE PAIN OF LEFT SHOULDER: ICD-10-CM

## 2021-02-09 PROCEDURE — 97110 THERAPEUTIC EXERCISES: CPT | Performed by: PHYSICAL THERAPIST

## 2021-02-09 PROCEDURE — 97140 MANUAL THERAPY 1/> REGIONS: CPT | Performed by: PHYSICAL THERAPIST

## 2021-02-09 PROCEDURE — 97112 NEUROMUSCULAR REEDUCATION: CPT | Performed by: PHYSICAL THERAPIST

## 2021-02-09 NOTE — PROGRESS NOTES
Daily Note     Today's date: 2021  Patient name: Phoebe Ward  : 1970  MRN: [id number]  Referring provider: Kristin Leventhal, MD  Dx:   Encounter Diagnosis     ICD-10-CM    1  Orthopedic aftercare  Z47 89    2  Acute pain of left shoulder  M25 512    3  Neck pain  M54 2    4  Subluxation of tendon of long head of biceps, right shoulder  S46 119A        Start Time: 1445  Stop Time: 1530  Total time in clinic (min): 45 minutes    Subjective: Pt notes continued discomfort in shoulder and scapular region  She had to shovel again using her opposite UE  Objective: See treatment diary below      Assessment: Pt continues to tolerate passive shoulder stretching better than previous session  Stiffness present in all planes as anticipated  Pt 3 5 weeks s/p surgery  Tolerated treatment fair  Continues to experience intermittent muscle spasms at involved shoulder  Patient would benefit from continued PT  Plan: Continue per plan of care  Precautions:   Surgery: Surgical Arthroscopy Right Shoulder with Rotator Cuff Repair, Long Head Biceps Tenodesis, Posterior Glenoid Labrum Repair and Subacromial DecompressionSurgical Arthroscopy Right Shoulder with Rotator Cuff Repair, Long Head Biceps Tenodesis, Posterior Glenoid Labrum Repair and Subacromial Decompression on 1/15/21  *Pt is to follow the accelerated RTC repair protocol     *D/C sling at 4 weeks    Manuals       Shoulder stretching-Protocol  15' (MH needed on scap) 8' 8' 9' 9' 9'        Right Scapular mobs  8' GrII 3'  Gr III 3' Gr III 3' Gr III 3'      STM to right UT and periscapular region   cupping and STM 8'  STM 8' STM 6' STM 6' STM 6'      CPA of T/S   Gr III 4' focus on upper thoracic region 7' Gr III  5' grIII        L UPA of C6,7    Gr III 3'                                    Neuro Re-Ed             Scapular retraction 20x 20x 30x 30x 30x sidelying 30x sidelying 30x sidelying      Scapular elevation/depression  15x   30x sidelying 30x sidelying 30x sidelying                                                                       Ther Ex             Pendulums 3' 3' 5' 5' 5' 5' 5'      UT stretch 5x10" B 5x10" 5x10" 5x10" 5x10" 5x10" 5x10"      LS stretch     5x10" 5x10" 5x10"      T/S extension             Elbow self PROM 10x 10x 30x 30x 30x 30x       Towel   20x            T/S decompression on FR   5'          Self PROM ER/IR       20x 20x                   Ther Activity                                       Gait Training                                       Modalities                                       1:1 with -330PM

## 2021-02-11 ENCOUNTER — OFFICE VISIT (OUTPATIENT)
Dept: PHYSICAL THERAPY | Facility: CLINIC | Age: 51
End: 2021-02-11
Payer: COMMERCIAL

## 2021-02-11 DIAGNOSIS — M25.512 ACUTE PAIN OF LEFT SHOULDER: ICD-10-CM

## 2021-02-11 DIAGNOSIS — Z47.89 ORTHOPEDIC AFTERCARE: Primary | ICD-10-CM

## 2021-02-11 DIAGNOSIS — S46.119A SUBLUXATION OF TENDON OF LONG HEAD OF BICEPS: ICD-10-CM

## 2021-02-11 DIAGNOSIS — M54.2 NECK PAIN: ICD-10-CM

## 2021-02-11 PROCEDURE — 97110 THERAPEUTIC EXERCISES: CPT | Performed by: PHYSICAL THERAPIST

## 2021-02-11 PROCEDURE — 97140 MANUAL THERAPY 1/> REGIONS: CPT | Performed by: PHYSICAL THERAPIST

## 2021-02-11 PROCEDURE — 97112 NEUROMUSCULAR REEDUCATION: CPT | Performed by: PHYSICAL THERAPIST

## 2021-02-11 NOTE — PROGRESS NOTES
Daily Note     Today's date: 2021  Patient name: Antonio Storm  : 1970  MRN: [id number]  Referring provider: Sandi Fall MD  Dx:   Encounter Diagnosis     ICD-10-CM    1  Orthopedic aftercare  Z47 89    2  Acute pain of left shoulder  M25 512    3  Neck pain  M54 2    4  Subluxation of tendon of long head of biceps, right shoulder  S46 119A        Start Time: 1445  Stop Time: 1530  Total time in clinic (min): 45 minutes    Subjective: Pt notes increased pain in opposite shoulder  She states "I hope I didn't tear anything in that shoulder "       Objective: See treatment diary below      Assessment: Pt continues to tolerate more passive stretching of the shoulder without muscle guarding  Pt is now 4 weeks s/p RTC repair  Performed active assisted serratus press today  Tolerated treatment well  Discussed with patient this difference between noxious stimuli and light touch  Due to chronicity of pain in scapular border, this region has sensitize and is very tender to light touch in absence of pathology  Pt may discontinue sling at 33 which is Saturday  Pt verbalized understanding  Patient would benefit from continued PT  Plan: Continue per plan of care  Precautions:   Surgery: Surgical Arthroscopy Right Shoulder with Rotator Cuff Repair, Long Head Biceps Tenodesis, Posterior Glenoid Labrum Repair and Subacromial DecompressionSurgical Arthroscopy Right Shoulder with Rotator Cuff Repair, Long Head Biceps Tenodesis, Posterior Glenoid Labrum Repair and Subacromial Decompression on 1/15/21  *Pt is to follow the accelerated RTC repair protocol     *D/C sling at 4 weeks    Manuals  2/     Shoulder stretching-Protocol  15' (MH needed on scap) 8' 8' 9' 9' 9'   5'     Right Scapular mobs  8' GrII 3'  Gr III 3' Gr III 3' Gr III 3' Gr III 3'     STM to right UT and periscapular region   cupping and STM 8'  STM 8' STM 6' STM 6' STM 6' STM 5'     CPA of T/S   Gr III 4' focus on upper thoracic region 7' Gr III  5' grIII        L UPA of C6,7    Gr III 3'          R GH joint mobs        Gr III 3'                  Neuro Re-Ed             Scapular retraction 20x 20x 30x 30x 30x sidelying 30x sidelying 30x sidelying 30x sidelying     Scapular elevation/depression  15x   30x sidelying 30x sidelying 30x sidelying 30x sidelying     Shoulder isometrics        nv                                                         Ther Ex             Pendulums 3' 3' 5' 5' 5' 5' 5' 3'     UT stretch 5x10" B 5x10" 5x10" 5x10" 5x10" 5x10" 5x10"      LS stretch     5x10" 5x10" 5x10"      T/S extension             Elbow self PROM 10x 10x 30x 30x 30x 30x       Towel   20x            T/S decompression on FR   5'     10x     Passive ER stretch with wand        20x     PB shoulder flexion AAROM        20x     Self PROM ER/IR       20x 20x 20x     Serratus press AAROM        15x     SL ER AAROM        nv     SL abduction AAROM        nv     SL flexion AAROM        nv     Rocco        nv                  Ther Activity                                       Gait Training                                       Modalities                                       1:1 with -330PM

## 2021-02-16 ENCOUNTER — OFFICE VISIT (OUTPATIENT)
Dept: PHYSICAL THERAPY | Facility: CLINIC | Age: 51
End: 2021-02-16
Payer: COMMERCIAL

## 2021-02-16 DIAGNOSIS — M25.512 ACUTE PAIN OF LEFT SHOULDER: ICD-10-CM

## 2021-02-16 DIAGNOSIS — S46.119A SUBLUXATION OF TENDON OF LONG HEAD OF BICEPS: ICD-10-CM

## 2021-02-16 DIAGNOSIS — M54.2 NECK PAIN: ICD-10-CM

## 2021-02-16 DIAGNOSIS — Z47.89 ORTHOPEDIC AFTERCARE: Primary | ICD-10-CM

## 2021-02-16 PROCEDURE — 97140 MANUAL THERAPY 1/> REGIONS: CPT | Performed by: PHYSICAL THERAPIST

## 2021-02-16 PROCEDURE — 97112 NEUROMUSCULAR REEDUCATION: CPT | Performed by: PHYSICAL THERAPIST

## 2021-02-16 PROCEDURE — 97110 THERAPEUTIC EXERCISES: CPT | Performed by: PHYSICAL THERAPIST

## 2021-02-16 NOTE — PROGRESS NOTES
Daily Note     Today's date: 2021  Patient name: Keith Sierra  : 1970  MRN: [id number]  Referring provider: Maricel Cantrell MD  Dx:   Encounter Diagnosis     ICD-10-CM    1  Orthopedic aftercare  Z47 89    2  Acute pain of left shoulder  M25 512    3  Neck pain  M54 2    4  Subluxation of tendon of long head of biceps, right shoulder  S46 119A        Start Time: 1455  Stop Time: 1530  Total time in clinic (min): 35 minutes    Subjective: Pt arrived late and was accommodated  Pt continues to report discomfort in scapular region on the right  Pt is scheduled to see Dr Vernon Parish on Thursday for this  I encouraged her to continued her neck stretching and ROM exercises  Objective: See treatment diary below      Assessment: Initiated week 4 of protocol with good tolerance  Pt is tolerating manual stretching with less guarding and discomfort  Moderate hypomobility and stiffness continues, as expected  Pt was able to D/C sling as instructed in protocol  Patient would benefit from continued PT      Plan: Continue per plan of care  Precautions:   Surgery: Surgical Arthroscopy Right Shoulder with Rotator Cuff Repair, Long Head Biceps Tenodesis, Posterior Glenoid Labrum Repair and Subacromial DecompressionSurgical Arthroscopy Right Shoulder with Rotator Cuff Repair, Long Head Biceps Tenodesis, Posterior Glenoid Labrum Repair and Subacromial Decompression on 1/15/21  *Pt is to follow the accelerated RTC repair protocol     *D/C sling at 4 weeks    Manuals  2/    Shoulder stretching-Protocol  15' (MH needed on scap) 8' 8' 9' 9' 9'   5' 5'    Right Scapular mobs  8' GrII 3'  Gr III 3' Gr III 3' Gr III 3' Gr III 3' Gr III 3;    STM to right UT and periscapular region   cupping and STM 8'  STM 8' STM 6' STM 6' STM 6' STM 5'     CPA of T/S   Gr III 4' focus on upper thoracic region 7' Gr III  5' grIII        L UPA of C6,7    Gr III 3'          R GH joint mobs        Gr III 3'                  Neuro Re-Ed             Scapular retraction 20x 20x 30x 30x 30x sidelying 30x sidelying 30x sidelying 30x sidelying     Scapular elevation/depression  15x   30x sidelying 30x sidelying 30x sidelying 30x sidelying     Shoulder isometrics        nv All planes 10x5"                                                         Ther Ex             Pendulums 3' 3' 5' 5' 5' 5' 5' 3' 3'    UT stretch 5x10" B 5x10" 5x10" 5x10" 5x10" 5x10" 5x10"      LS stretch     5x10" 5x10" 5x10"      T/S extension             Elbow self PROM 10x 10x 30x 30x 30x 30x       Towel   20x            T/S decompression on FR   5'     10x     Passive ER stretch with wand        20x     PB shoulder flexion AAROM        20x     Self PROM ER/IR       20x 20x 20x     Serratus press AAROM        15x     SL ER AAROM        nv 2x10    SL abduction AAROM        nv 10x    SL flexion AAROM        nv 10x    P[place]ley        nv                  Ther Activity                                       Gait Training                                       Modalities                                       1:1 with -330PM

## 2021-02-18 ENCOUNTER — APPOINTMENT (OUTPATIENT)
Dept: PHYSICAL THERAPY | Facility: CLINIC | Age: 51
End: 2021-02-18
Payer: COMMERCIAL

## 2021-02-23 ENCOUNTER — OFFICE VISIT (OUTPATIENT)
Dept: PHYSICAL THERAPY | Facility: CLINIC | Age: 51
End: 2021-02-23
Payer: COMMERCIAL

## 2021-02-23 DIAGNOSIS — M25.512 ACUTE PAIN OF LEFT SHOULDER: ICD-10-CM

## 2021-02-23 DIAGNOSIS — M54.2 NECK PAIN: ICD-10-CM

## 2021-02-23 DIAGNOSIS — S46.119A SUBLUXATION OF TENDON OF LONG HEAD OF BICEPS: ICD-10-CM

## 2021-02-23 DIAGNOSIS — Z47.89 ORTHOPEDIC AFTERCARE: Primary | ICD-10-CM

## 2021-02-23 PROCEDURE — 97140 MANUAL THERAPY 1/> REGIONS: CPT | Performed by: PHYSICAL THERAPIST

## 2021-02-23 PROCEDURE — 97112 NEUROMUSCULAR REEDUCATION: CPT | Performed by: PHYSICAL THERAPIST

## 2021-02-23 PROCEDURE — 97110 THERAPEUTIC EXERCISES: CPT | Performed by: PHYSICAL THERAPIST

## 2021-02-23 NOTE — PROGRESS NOTES
Daily Note     Today's date: 2021  Patient name: Christian Borja  : 1970  MRN: [id number]  Referring provider: Braden Zurita MD  Dx:   Encounter Diagnosis     ICD-10-CM    1  Orthopedic aftercare  Z47 89    2  Acute pain of left shoulder  M25 512    3  Neck pain  M54 2    4  Subluxation of tendon of long head of biceps, right shoulder  S46 119A        Start Time: 6444  Stop Time: 1530  Total time in clinic (min): 45 minutes    Subjective: Pt reports feeling improvement from previous session  Objective: See treatment diary below      Assessment: Pt is now 5 5 weeks s/p RTC repair  Pt has missed 1  PT session last week due to inclement weather  Performed pulley's today for AAROM and stretching  Advanced program today with good tolerance  Pt only able to actively flex shoulder to 80 degrees prior to scapula hiking  Patient would benefit from continued PT      Plan: Continue per plan of care  Precautions:   Surgery: Surgical Arthroscopy Right Shoulder with Rotator Cuff Repair, Long Head Biceps Tenodesis, Posterior Glenoid Labrum Repair and Subacromial DecompressionSurgical Arthroscopy Right Shoulder with Rotator Cuff Repair, Long Head Biceps Tenodesis, Posterior Glenoid Labrum Repair and Subacromial Decompression on 1/15/21  *Pt is to follow the accelerated RTC repair protocol     *D/C sling at 4 weeks    Manuals  2/4    Shoulder stretching-Protocol  15' (MH needed on scap) 8' 8' 9' 9' 9'   5' 5' 5'   Right Scapular mobs  8' GrII 3'  Gr III 3' Gr III 3' Gr III 3' Gr III 3' Gr III 3 Gr III 3   STM to right UT and periscapular region   cupping and STM 8'  STM 8' STM 6' STM 6' STM 6' STM 5'     CPA of T/S   Gr III 4' focus on upper thoracic region 7' Gr III  5' grIII        L UPA of C6,7    Gr III 3'          R GH joint mobs        Gr III 3'  Gr III 4' all planes                Neuro Re-Ed             Scapular retraction 20x 20x 30x 30x 30x sidelying 30x sidelying 30x sidelying 30x sidelying     Scapular elevation/depression  15x   30x sidelying 30x sidelying 30x sidelying 30x sidelying     Shoulder isometrics        nv All planes 10x5"  All planes 10x5"    Shoulder flexion with mirror (opp UE for assistance)          5x                                          Ther Ex             Pendulums 3' 3' 5' 5' 5' 5' 5' 3' 3'    UT stretch 5x10" B 5x10" 5x10" 5x10" 5x10" 5x10" 5x10"      LS stretch     5x10" 5x10" 5x10"      T/S extension             Elbow self PROM 10x 10x 30x 30x 30x 30x       Towel   20x            T/S decompression on FR   5'     10x     Passive ER stretch with wand        20x  20x5"   PB shoulder flexion AAROM        20x     Self PROM ER/IR       20x 20x 20x     Serratus press AAROM        15x  2x10   SL ER AAROM        nv 2x10 2x10   SL abduction AAROM        nv 10x 2x10   SL flexion AAROM        nv 10x 2x10   Pulley        nv  5'   Shoulder extension stretch with wand          10x5"   Ther Activity                                       Gait Training                                       Modalities                                       1:1 with -330pm

## 2021-02-25 ENCOUNTER — EVALUATION (OUTPATIENT)
Dept: PHYSICAL THERAPY | Facility: CLINIC | Age: 51
End: 2021-02-25
Payer: COMMERCIAL

## 2021-02-25 DIAGNOSIS — S46.119A SUBLUXATION OF TENDON OF LONG HEAD OF BICEPS: ICD-10-CM

## 2021-02-25 DIAGNOSIS — M54.2 NECK PAIN: ICD-10-CM

## 2021-02-25 DIAGNOSIS — M25.512 ACUTE PAIN OF LEFT SHOULDER: ICD-10-CM

## 2021-02-25 DIAGNOSIS — Z47.89 ORTHOPEDIC AFTERCARE: Primary | ICD-10-CM

## 2021-02-25 PROCEDURE — 97140 MANUAL THERAPY 1/> REGIONS: CPT | Performed by: PHYSICAL THERAPIST

## 2021-02-25 PROCEDURE — 97112 NEUROMUSCULAR REEDUCATION: CPT | Performed by: PHYSICAL THERAPIST

## 2021-02-25 PROCEDURE — 97110 THERAPEUTIC EXERCISES: CPT | Performed by: PHYSICAL THERAPIST

## 2021-02-25 NOTE — PROGRESS NOTES
[id number]     Today's date: 2021  Patient name: Mckenzie Herring  : 1970  MRN: [id number]  Referring provider: Nhan Blue MD  Dx:   Encounter Diagnosis     ICD-10-CM    1  Orthopedic aftercare  Z47 89    2  Acute pain of left shoulder  M25 512    3  Neck pain  M54 2    4  Subluxation of tendon of long head of biceps, right shoulder  S46 119A        Start Time: 1145  Stop Time: 1225  Total time in clinic (min): 40 minutes    Assessment  Assessment details: Patient is s/p 6 weeks right rotator cuff repair and biceps tenodesis on 1/15/21 with Dr Israel Reid  Patient is doing fairly well post operative rehabilitation program; although has developed considerable shoulder stiffness  Patient is starting to tolerate passive ROM  Pt struggled with tolerating PROM in the early stages of her rehabilitation secondary to R scapular pain  Various techniques were applied to help reduce muscle guarding and improve tolerance  Pt continues to have have scapular pain vs true glenohumeral pain  Passive motion has improved in all planes but continues to be limited at end range  Pt is no longer in sling at this time  Pt has limited AROM in all planes  During flexion, superior migration of humeral head and scapular hiking is present  Pain notes pain has now reduced to 0-2/10  Functional status measure has improved to 39  Patient would benefit from skilled PT at this time to improve function, reduce pain, increase ROM, and return to premorbid status  Impairments: abnormal muscle tone, abnormal or restricted ROM, abnormal movement and impaired physical strength  Understanding of Dx/Px/POC: good   Prognosis: good    Goals  Short Term Goals: to be achieved by 4 weeks  1) Patient to be independent with basic HEP -MET  2) Decrease pain to 1 and 3/10 at its worst -MET  3) Increase shoulder PROM by 5-10 degrees-MET     Long Term Goals: to be achieved by discharge  1) FOTO equal to or greater than expected  -Partially met  2) Patient to be independent with comprehensive HEP  -Partially met  3) Abolish pain for improved quality of life -Partially met  4) Increase shoulder ROM to within functional limits to improve a/iadls -Partially met  5) Increase shoulder strength to 5/5 MMT grade in all planes to improve a/iadls  -Partially met  6) Patient to return to full duty at work  -Partially met    Plan  Patient would benefit from: skilled PT  Planned modality interventions: cryotherapy and TENS  Planned therapy interventions: manual therapy, neuromuscular re-education, patient education, therapeutic activities, therapeutic exercise and functional ROM exercises  Frequency: 2-3x per week for 4-6 weeks  Treatment plan discussed with: patient        Subjective Evaluation    History of Present Illness  Mechanism of injury: surgery  Mechanism of injury: History of Current Injury: 3 days s/p Surgical Arthroscopy Right Shoulder with Rotator Cuff Repair, Long Head Biceps Tenodesis, Posterior Glenoid Labrum Repair and Subacromial Decompression on 1/15/21  Pt believes her shoulder pain started after a fall at [employer]  Pt has a previous history of neck pain which is now aggravated  12 years ago, patient had bilateral labral repairs  Pain location/Descriptors: posterior shoulder   Special Questions: Mild numbness in hand   Patient goals:  Return to active lifestyle  Hobbies/Interest: goes to the gym to exercise, jason  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, computer work  Pt reacts to the cold and can break out in hives with ice       Pain  Current pain ratin  At best pain ratin  At worst pain ratin    Hand dominance: right    Treatments  No previous or current treatments  Patient Goals  Patient goals for therapy: decreased pain, increased motion, increased strength, independence with ADLs/IADLs, return to sport/leisure activities and return to work          Objective     Observations   Left Shoulder   Negative for incision  Additional Observation Details  Incision intact with no observable sign of infection    Palpation     Additional Palpation Details  Tenderness around entire shoulder girdle of R shoulder    Active Range of Motion     Right Shoulder   Flexion: 90 (superior migration of humeral head and scapular hiking) degrees   Extension: 50 degrees   Abduction: 75 (Pt attempts to side bend to the left to compensate) degrees   External rotation 0°: 60 degrees     Right Wrist   Normal active range of motion    Additional Active Range of Motion Details  Normal right wrist AROM    Passive Range of Motion     Right Shoulder   Flexion: 140 degrees   Abduction: 95 degrees   External rotation 45°: 45 degrees   Internal rotation 45°: 42 degrees     Right Elbow   Flexion: 140 degrees   Extension: 0 degrees     Additional Passive Range of Motion Details  Not tested today     Strength/Myotome Testing     Right Wrist/Hand   Wrist extension: 4  Wrist flexion: 4    Additional Strength Details   strength:   Right- 50#  Left- 65#    General Comments:      Shoulder Comments   Pt is neurovascularly intact in RUE      Flowsheet Rows      Most Recent Value   PT/OT G-Codes   Current Score  39   Projected Score  39             Precautions:   Surgery: Surgical Arthroscopy Right Shoulder with Rotator Cuff Repair, Long Head Biceps Tenodesis, Posterior Glenoid Labrum Repair and Subacromial DecompressionSurgical Arthroscopy Right Shoulder with Rotator Cuff Repair, Long Head Biceps Tenodesis, Posterior Glenoid Labrum Repair and Subacromial Decompression on 1/15/21  *Pt is to follow the accelerated RTC repair protocol       Manuals 2/25 2/4 2/5 2/9 2/11 2/16 2/23   Shoulder stretching-Protocol 10'    9' 9' 9'   5' 5' 5'   Right Scapular mobs     Gr III 3' Gr III 3' Gr III 3' Gr III 3' Gr III 3 Gr III 3   STM to right UT and periscapular region     STM 6' STM 6' STM 6' STM 5'     CPA of T/S     5' grIII        L UPA of C6,7              R [facility] joint mobs Gr III all planes 5'        Gr III 3'  Gr III 4' all planes                Neuro Re-Ed             Scapular retraction     30x sidelying 30x sidelying 30x sidelying 30x sidelying     Scapular elevation/depression     30x sidelying 30x sidelying 30x sidelying 30x sidelying     Shoulder isometrics        nv All planes 10x5"  All planes 10x5"    Shoulder flexion with mirror (opp UE for assistance)          5x                                          Ther Ex             Pendulums     5' 5' 5' 3' 3'    UT stretch     5x10" 5x10" 5x10"      LS stretch     5x10" 5x10" 5x10"      T/S extension             Elbow self PROM     30x 30x       Towel               T/S decompression on FR        10x     Passive ER stretch with wand 20x5"       20x  20x5"   PB shoulder flexion AAROM        20x     Self PROM ER/IR       20x 20x 20x     Serratus press AAROM        15x  2x10   SL ER AAROM        nv 2x10 2x10   SL abduction AAROM        nv 10x 2x10   SL flexion AAROM        nv 10x 2x10   Pulley 5'       nv  5'   Shoulder extension stretch with wand 20x5"         10x5"   Ther Activity                                       Gait Training                                       Modalities                                       1:1 with PT 1145-1225p  Pt was re-evaluated today x15'

## 2021-03-01 ENCOUNTER — OFFICE VISIT (OUTPATIENT)
Dept: OBGYN CLINIC | Facility: OTHER | Age: 51
End: 2021-03-01

## 2021-03-01 VITALS
SYSTOLIC BLOOD PRESSURE: 129 MMHG | HEART RATE: 57 BPM | DIASTOLIC BLOOD PRESSURE: 78 MMHG | BODY MASS INDEX: 24.05 KG/M2 | WEIGHT: 149 LBS

## 2021-03-01 DIAGNOSIS — Z47.89 AFTERCARE FOLLOWING SURGERY OF THE MUSCULOSKELETAL SYSTEM: Primary | ICD-10-CM

## 2021-03-01 PROCEDURE — 99024 POSTOP FOLLOW-UP VISIT: CPT | Performed by: PHYSICIAN ASSISTANT

## 2021-03-01 NOTE — PROGRESS NOTES
Assessment:       1  Aftercare following surgery of the musculoskeletal system          Plan:        Patient is doing well postoperatively  She is making progress in PT  All questions were addressed/answered to the patient's satisfaction  She will continue rehab protocol  Follow-up is in 2 months to assess patient's progress  Subjective:     Patient ID: Hugo Woodurff is a 45 y o  female  Chief Complaint:    HPI    Patient presents for follow-up status post right shoulder arthroscopy, rotator cuff repair, and labrum repair on 1/15/2021  C/o Pain in right scapula persists but controlled  Social History     Occupational History    Not on file   Tobacco Use    Smoking status: Never Smoker    Smokeless tobacco: Never Used   Substance and Sexual Activity    Alcohol use: Not Currently     Frequency: Never    Drug use: Never    Sexual activity: Not on file      Review of Systems   Constitutional: Negative  Respiratory: Negative  Musculoskeletal: Positive for myalgias  Negative for arthralgias  Skin: Negative for wound  Neurological: Positive for weakness  Negative for numbness  Psychiatric/Behavioral: Negative  Objective:     Ortho ExamPhysical Exam  HENT:      Head: Atraumatic  Cardiovascular:      Pulses: Normal pulses  Musculoskeletal:         General: Tenderness present  Comments: Active ROM right shoulder-forward flexion 90 degrees, external rotation 60 degrees   Skin:     General: Skin is warm and dry  Capillary Refill: Capillary refill takes less than 2 seconds  Comments: Surgical port closed, incision healed   Neurological:      Mental Status: She is alert and oriented to person, place, and time  Sensory: No sensory deficit     Psychiatric:         Mood and Affect: Mood normal          Behavior: Behavior normal

## 2021-03-01 NOTE — PATIENT INSTRUCTIONS
1  Continue PT per protocol  2  No lifting, pulling, or pushing right arm  3   Follow-up in 2 months

## 2021-03-02 ENCOUNTER — OFFICE VISIT (OUTPATIENT)
Dept: PHYSICAL THERAPY | Facility: CLINIC | Age: 51
End: 2021-03-02
Payer: COMMERCIAL

## 2021-03-02 DIAGNOSIS — M25.512 ACUTE PAIN OF LEFT SHOULDER: ICD-10-CM

## 2021-03-02 DIAGNOSIS — S46.119A SUBLUXATION OF TENDON OF LONG HEAD OF BICEPS: ICD-10-CM

## 2021-03-02 DIAGNOSIS — M54.2 NECK PAIN: ICD-10-CM

## 2021-03-02 DIAGNOSIS — Z47.89 ORTHOPEDIC AFTERCARE: Primary | ICD-10-CM

## 2021-03-02 PROCEDURE — 97112 NEUROMUSCULAR REEDUCATION: CPT | Performed by: PHYSICAL THERAPIST

## 2021-03-02 PROCEDURE — 97140 MANUAL THERAPY 1/> REGIONS: CPT | Performed by: PHYSICAL THERAPIST

## 2021-03-02 PROCEDURE — 97110 THERAPEUTIC EXERCISES: CPT | Performed by: PHYSICAL THERAPIST

## 2021-03-02 NOTE — PROGRESS NOTES
Daily Note     Today's date: 3/2/2021  Patient name: Luis Enrique Bear  : 1970  MRN: [id number]  Referring provider: Farzana John MD  Dx:   Encounter Diagnosis     ICD-10-CM    1  Orthopedic aftercare  Z47 89    2  Acute pain of left shoulder  M25 512    3  Neck pain  M54 2    4  Subluxation of tendon of long head of biceps, right shoulder  S46 119A        Start Time: 1745  Stop Time: 1830  Total time in clinic (min): 45 minutes    Subjective: Pt f/u with Dr Devi Lights for 6 week appointment  Pt doing well; however, she continues to experience R scapular pain  Objective: See treatment diary below      Assessment: PROM gradually improving in all planes  Most limited with IR/ER at various degrees of abduction  Performed ER AROM with good tolerance  Pt require cueing for correct performance of exercise  Initiated sleeper stretch today  Patient demonstrated fatigue post treatment and would benefit from continued PT  Plan: Continue per plan of care  Precautions:   Surgery: Surgical Arthroscopy Right Shoulder with Rotator Cuff Repair, Long Head Biceps Tenodesis, Posterior Glenoid Labrum Repair and Subacromial DecompressionSurgical Arthroscopy Right Shoulder with Rotator Cuff Repair, Long Head Biceps Tenodesis, Posterior Glenoid Labrum Repair and Subacromial Decompression on 1/15/21  *Pt is to follow the accelerated RTC repair protocol       Manuals 2/25 3/2      2/11 2/16 2/23   Shoulder stretching-Protocol 10' 10'      5' 5' 5'   Right Scapular mobs        Gr III 3' Gr III 3 Gr III 3   STM to right UT and periscapular region        STM 5'     CPA of T/S             L UPA of C6,7              R GH joint mobs Gr III all planes 5'  Gr III all planes 5'       Gr III 3'  Gr III 4' all planes                Neuro Re-Ed             Scapular retraction        30x sidelying     Scapular elevation/depression        30x sidelying     Shoulder isometrics        nv All planes 10x5"  All planes 10x5" Shoulder flexion with mirror (opp UE for assistance)  20x        5x                                          Ther Ex             Pendulums        3' 3'    UT stretch             LS stretch             T/S extension             Elbow self PROM             Towel               T/S decompression on FR        10x     Passive ER stretch with wand 20x5"       20x  20x5"   PB shoulder flexion AAROM        20x     Self PROM ER/IR         20x     Serratus press AAROM        15x  2x10   SL ER AROM  30x      nv 2x10 2x10   SL abduction AAROM  20x      nv 10x 2x10   SL flexion AAROM        nv 10x 2x10   Pulley 5' 5'      nv  5'   Sleeper stretch  10x10"            Shoulder extension stretch with wand 20x5" 20x5"        10x5"   Ther Activity                                       Gait Training                                       Modalities                                       1:1 with -925pm

## 2021-03-04 ENCOUNTER — OFFICE VISIT (OUTPATIENT)
Dept: PHYSICAL THERAPY | Facility: CLINIC | Age: 51
End: 2021-03-04
Payer: COMMERCIAL

## 2021-03-04 DIAGNOSIS — M54.2 NECK PAIN: ICD-10-CM

## 2021-03-04 DIAGNOSIS — Z47.89 ORTHOPEDIC AFTERCARE: Primary | ICD-10-CM

## 2021-03-04 DIAGNOSIS — M25.512 ACUTE PAIN OF LEFT SHOULDER: ICD-10-CM

## 2021-03-04 DIAGNOSIS — S46.119A SUBLUXATION OF TENDON OF LONG HEAD OF BICEPS: ICD-10-CM

## 2021-03-04 PROCEDURE — 97112 NEUROMUSCULAR REEDUCATION: CPT | Performed by: PHYSICAL THERAPIST

## 2021-03-04 PROCEDURE — 97110 THERAPEUTIC EXERCISES: CPT | Performed by: PHYSICAL THERAPIST

## 2021-03-04 PROCEDURE — 97140 MANUAL THERAPY 1/> REGIONS: CPT | Performed by: PHYSICAL THERAPIST

## 2021-03-04 NOTE — PROGRESS NOTES
Daily Note     Today's date: 3/4/2021  Patient name: Guillermo Cade  : 1970  MRN: [id number]  Referring provider: Nicol Ray MD  Dx:   Encounter Diagnosis     ICD-10-CM    1  Orthopedic aftercare  Z47 89    2  Acute pain of left shoulder  M25 512    3  Neck pain  M54 2    4  Subluxation of tendon of long head of biceps, right shoulder  S46 119A                   Subjective: Pt notes increase scapular discomfort today  Objective: See treatment diary below      Assessment: Pt is 7 weeks post op  Pt had difficulty tolerating passive stretching today secondary to pain in posterior shoulder  I updated patient's HEP with was focused on ROM and neuromuscular control of the shoulder  Tolerated treatment fair  Considerable stiffness in shoulder in all planes  Pt requires cueing for correct performance of exercises  Patient would benefit from continued PT  Plan: Continue per plan of care  Precautions:   Surgery: Surgical Arthroscopy Right Shoulder with Rotator Cuff Repair, Long Head Biceps Tenodesis, Posterior Glenoid Labrum Repair and Subacromial DecompressionSurgical Arthroscopy Right Shoulder with Rotator Cuff Repair, Long Head Biceps Tenodesis, Posterior Glenoid Labrum Repair and Subacromial Decompression on 1/15/21  *Pt is to follow the accelerated RTC repair protocol       Manuals 2/25 3/2 3/4     2/11 2/16 2/23   Shoulder stretching-Protocol 10' 10' 10'     5' 5' 5'   Right Scapular mobs        Gr III 3' Gr III 3 Gr III 3   STM to right UT and periscapular region        STM 5'     CPA of T/S             L UPA of C6,7              R GH joint mobs Gr III all planes 5'  Gr III all planes 5'  Gr III all planes 5'      Gr III 3'  Gr III 4' all planes                Neuro Re-Ed             Scapular retraction        30x sidelying     Scapular elevation/depression        30x sidelying     Shoulder isometrics        nv All planes 10x5"  All planes 10x5"    Shoulder flexion with mirror (opp UE for assistance)  20x 20x       5x                                          Ther Ex             Pendulums        3' 3'    Passive ER stretch with wand 20x5"  20x5"     20x  20x5"   PB shoulder flexion AAROM   20x     20x     Serratus press AAROM        15x  2x10   SL ER AROM  30x      nv 2x10 2x10   SL abduction AAROM  20x 20x     nv 10x 2x10   SL flexion AAROM        nv 10x 2x10   Pulley 5' 5' 5'     nv  5'   Sleeper stretch  10x10"  5x20"          Shoulder extension stretch with wand 20x5" 20x5"        10x5"   Ther Activity                                       Gait Training                                       Modalities                                       1:1 with -802pm  Access Code: [email or web address]  Patient Portal: [email or web address]

## 2021-03-09 ENCOUNTER — OFFICE VISIT (OUTPATIENT)
Dept: PHYSICAL THERAPY | Facility: CLINIC | Age: 51
End: 2021-03-09
Payer: COMMERCIAL

## 2021-03-09 DIAGNOSIS — Z47.89 ORTHOPEDIC AFTERCARE: Primary | ICD-10-CM

## 2021-03-09 DIAGNOSIS — M54.2 NECK PAIN: ICD-10-CM

## 2021-03-09 DIAGNOSIS — S46.119A SUBLUXATION OF TENDON OF LONG HEAD OF BICEPS: ICD-10-CM

## 2021-03-09 DIAGNOSIS — M25.512 ACUTE PAIN OF LEFT SHOULDER: ICD-10-CM

## 2021-03-09 PROCEDURE — 97140 MANUAL THERAPY 1/> REGIONS: CPT | Performed by: PHYSICAL THERAPIST

## 2021-03-09 PROCEDURE — 97110 THERAPEUTIC EXERCISES: CPT | Performed by: PHYSICAL THERAPIST

## 2021-03-09 NOTE — PROGRESS NOTES
Daily Note     Today's date: 3/9/2021  Patient name: Cuauhtemoc Smith  : 1970  MRN: [id number]  Referring provider: Maddie Nguyen MD  Dx:   Encounter Diagnosis     ICD-10-CM    1  Orthopedic aftercare  Z47 89    2  Acute pain of left shoulder  M25 512    3  Neck pain  M54 2    4  Subluxation of tendon of long head of biceps, right shoulder  S46 119A        Start Time: 1745  Stop Time: 1830  Total time in clinic (min): 45 minutes    Subjective: Pt is frustrated that medical bills are so high she is concerned she may not continue with PT        Objective: See treatment diary below      Assessment: Progressed AROM program and patient tolerated well  Pt requires cueing to decrease scapular hiking and elevation during sidelying shoulder abduction  Pt fairly fatigued with active ROM exercises  Patient would benefit from continued PT      Plan: Continue per plan of care  Precautions:   Surgery: Surgical Arthroscopy Right Shoulder with Rotator Cuff Repair, Long Head Biceps Tenodesis, Posterior Glenoid Labrum Repair and Subacromial DecompressionSurgical Arthroscopy Right Shoulder with Rotator Cuff Repair, Long Head Biceps Tenodesis, Posterior Glenoid Labrum Repair and Subacromial Decompression on 1/15/21  *Pt is to follow the accelerated RTC repair protocol       Manuals 2/25 3/2 3/4 3/9    2/11 2/16 2/23   Shoulder stretching-Protocol 10' 10' 10' 4'    5' 5' 5'   Right Scapular mobs    3' GrIII    Gr III 3' Gr III 3 Gr III 3   STM to right UT and periscapular region        STM 5'     CPA of T/S             L UPA of C6,7              R GH joint mobs Gr III all planes 5'  Gr III all planes 5'  Gr III all planes 5'  Gr III all planes 3'     Gr III 3'  Gr III 4' all planes                Neuro Re-Ed             Scapular retraction        30x sidelying     Scapular elevation/depression        30x sidelying     Shoulder isometrics        nv All planes 10x5"  All planes 10x5"    Shoulder flexion with mirror (opp UE for assistance)  20x 20x 15x      5x                                          Ther Ex             Pendulums        3' 3'    Passive ER stretch with wand 20x5"  20x5" 20x5"    20x  20x5"   PB shoulder flexion AAROM   20x     20x     Serratus press AAROM        15x  2x10   SL ER AROM  30x  3x10    nv 2x10 2x10   SL abduction   20x 20x 15x elbow flexed     nv 10x 2x10   Shoulder flexion in supine     3x10    nv 10x 2x10   Pulley 5' 5' 5' 5'    nv  5'   Sleeper stretch  10x10"  5x20" 5x20"         Shoulder extension stretch with wand 20x5" 20x5"  DC      10x5"   IR BTB stretch with strap    10x5"         Ther Activity                                       Gait Training                                       Modalities                                       1:1 with -062pm  Update HEP today

## 2021-03-11 ENCOUNTER — OFFICE VISIT (OUTPATIENT)
Dept: PHYSICAL THERAPY | Facility: CLINIC | Age: 51
End: 2021-03-11
Payer: COMMERCIAL

## 2021-03-11 DIAGNOSIS — M54.2 NECK PAIN: ICD-10-CM

## 2021-03-11 DIAGNOSIS — M25.512 ACUTE PAIN OF LEFT SHOULDER: ICD-10-CM

## 2021-03-11 DIAGNOSIS — Z47.89 ORTHOPEDIC AFTERCARE: Primary | ICD-10-CM

## 2021-03-11 DIAGNOSIS — S46.119A SUBLUXATION OF TENDON OF LONG HEAD OF BICEPS: ICD-10-CM

## 2021-03-11 PROCEDURE — 97110 THERAPEUTIC EXERCISES: CPT | Performed by: PHYSICAL THERAPIST

## 2021-03-11 PROCEDURE — 97140 MANUAL THERAPY 1/> REGIONS: CPT | Performed by: PHYSICAL THERAPIST

## 2021-03-11 NOTE — PROGRESS NOTES
Daily Note     Today's date: 3/11/2021  Patient name: Charlie Patient  : 1970  MRN: [id number]  Referring provider: Lyubov Gonzalez MD  Dx:   Encounter Diagnosis     ICD-10-CM    1  Orthopedic aftercare  Z47 89    2  Acute pain of left shoulder  M25 512    3  Neck pain  M54 2    4  Subluxation of tendon of long head of biceps, right shoulder  S46 119A        Start Time: 1739  Stop Time: 1830  Total time in clinic (min): 51 minutes    Subjective: Pt has been very compliant with HEP and did her program 2x today  Objective: See treatment diary below      Assessment: Discussed with patient realistic expectations in terms of ROM  Due to frozen shoulder, pt should continue to progress stretching program daily as tolerated  Pt needs to monitored her shoulder hiking during AROM  She should use a mirror for visual feedback and reduce scapular elevation during AROM  Tolerated treatment well  Patient would benefit from continued PT  Plan: Continue per plan of care  Precautions:   Surgery: Surgical Arthroscopy Right Shoulder with Rotator Cuff Repair, Long Head Biceps Tenodesis, Posterior Glenoid Labrum Repair and Subacromial DecompressionSurgical Arthroscopy Right Shoulder with Rotator Cuff Repair, Long Head Biceps Tenodesis, Posterior Glenoid Labrum Repair and Subacromial Decompression on 1/15/21  *Pt is to follow the accelerated RTC repair protocol       Manuals 2/25 3/2 3/4 3/9 3/11   2/11 2/16 2/23   Shoulder stretching-Protocol 10' 10' 10' 4' 5'   5' 5' 5'   Right Scapular mobs    3' GrIII    Gr III 3' Gr III 3 Gr III 3   STM to right UT and periscapular region        STM 5'     CPA of T/S             L UPA of C6,7              R GH joint mobs Gr III all planes 5'  Gr III all planes 5'  Gr III all planes 5'  Gr III all planes 3'  Gr III all planes 3'    Gr III 3'  Gr III 4' all planes                Neuro Re-Ed             Scapular retraction        30x sidelying     Scapular elevation/depression 30x sidelying     Shoulder isometrics        nv All planes 10x5"  All planes 10x5"    Shoulder flexion with mirror (opp UE for assistance)  20x 20x 15x 15x     5x   IR/ER TB     nv        Prone row     nv        Horizontal ABD     nv        Ther Ex             Pendulums        3' 3'    Passive ER stretch with wand 20x5"  20x5" 20x5" Wedge into ER stretch 5x20"    20x  20x5"   PB shoulder flexion AAROM   20x     20x     Serratus press AAROM        15x  2x10   SL ER AROM  30x  3x10 3x10   nv 2x10 2x10   SL abduction   20x 20x 15x elbow flexed  2x10   nv 10x 2x10   Shoulder flexion in supine     3x10 2x15 with PT assistance   nv 10x 2x10   Pulley 5' 5' 5' 5' 5'   nv  5'   Sleeper stretch  10x10"  5x20" 5x20" 5x20"        Shoulder extension stretch with wand 20x5" 20x5"  DC With horiz add 20x5"     10x5"   IR BTB stretch with strap    10x5" 10x5"        Ther Activity                                       Gait Training                                       Modalities                                       1:1 with -260em

## 2021-03-16 ENCOUNTER — OFFICE VISIT (OUTPATIENT)
Dept: PHYSICAL THERAPY | Facility: CLINIC | Age: 51
End: 2021-03-16
Payer: COMMERCIAL

## 2021-03-16 DIAGNOSIS — M54.2 NECK PAIN: ICD-10-CM

## 2021-03-16 DIAGNOSIS — M25.512 ACUTE PAIN OF LEFT SHOULDER: ICD-10-CM

## 2021-03-16 DIAGNOSIS — Z47.89 ORTHOPEDIC AFTERCARE: Primary | ICD-10-CM

## 2021-03-16 DIAGNOSIS — S46.119A SUBLUXATION OF TENDON OF LONG HEAD OF BICEPS: ICD-10-CM

## 2021-03-16 PROCEDURE — 97112 NEUROMUSCULAR REEDUCATION: CPT | Performed by: PHYSICAL THERAPIST

## 2021-03-16 PROCEDURE — 97140 MANUAL THERAPY 1/> REGIONS: CPT | Performed by: PHYSICAL THERAPIST

## 2021-03-16 PROCEDURE — 97110 THERAPEUTIC EXERCISES: CPT | Performed by: PHYSICAL THERAPIST

## 2021-03-16 NOTE — PROGRESS NOTES
Daily Note     Today's date: 3/16/2021  Patient name: Renetta López  : 1970  MRN: [id number]  Referring provider: Elie Ross MD  Dx:   Encounter Diagnosis     ICD-10-CM    1  Orthopedic aftercare  Z47 89    2  Acute pain of left shoulder  M25 512    3  Neck pain  M54 2    4  Subluxation of tendon of long head of biceps, right shoulder  S46 119A        Start Time: 1745  Stop Time: 1830  Total time in clinic (min): 45 minutes    Subjective: Pt offers no new complaints  She added resistance band exercises to her HEP for her mid back  Objective: See treatment diary below      Assessment: Pt now 8 5 weeks post op  Progressing fairly well; continues to experience posterior scapular pain and shoulder stiffness  Pt is very compliant with HEP  Updated program to included light TB RTC strengthening exercises  Tolerated treatment well  Patient demonstrated fatigue post treatment      Plan: Continue per plan of care  Precautions:   Surgery: Surgical Arthroscopy Right Shoulder with Rotator Cuff Repair, Long Head Biceps Tenodesis, Posterior Glenoid Labrum Repair and Subacromial DecompressionSurgical Arthroscopy Right Shoulder with Rotator Cuff Repair, Long Head Biceps Tenodesis, Posterior Glenoid Labrum Repair and Subacromial Decompression on 1/15/21  *Pt is to follow the accelerated RTC repair protocol       Manuals 2/25 3/2 3/4 3/9 3/11 3/16       Shoulder stretching-Protocol 10' 10' 10' 4' 5'        Right Scapular mobs    3' GrIII         STM to right UT and periscapular region             CPA of T/S             L UPA of C6,7              R GH joint mobs Gr III all planes 5'  Gr III all planes 5'  Gr III all planes 5'  Gr III all planes 3'  Gr III all planes 3'                      Neuro Re-Ed             Scapular retraction             Scapular elevation/depression             Shoulder isometrics             Shoulder flexion with mirror (opp UE for assistance)  20x 20x 15x 15x        IR/ER TB     nv 2x10 ytb each       TB row     nv 3x10 BTB       Horizontal ABD     nv Bent over       Ther Ex             Pendulums             Passive ER stretch with wand 20x5"  20x5" 20x5" Wedge into ER stretch 5x20"  Wedge into ER stretch 5x20"        PB shoulder flexion AAROM   20x          Serratus press AAROM             SL ER AROM  30x  3x10 3x10 3x10       SL abduction   20x 20x 15x elbow flexed  2x10        Shoulder flexion in supine     3x10 2x15 with PT assistance 2x15 with PT assistance       Pulley 5' 5' 5' 5' 5' 5'       Sleeper stretch  10x10"  5x20" 5x20" 5x20" 5x20"       Shoulder extension stretch with wand 20x5" 20x5"  DC With horiz add 20x5" With horiz add 20x5"       IR BTB stretch with strap    10x5" 10x5" 10x5"       Ther Activity                                       Gait Training                                       Modalities                                       1:1 with -013m

## 2021-03-18 ENCOUNTER — OFFICE VISIT (OUTPATIENT)
Dept: PHYSICAL THERAPY | Facility: CLINIC | Age: 51
End: 2021-03-18
Payer: COMMERCIAL

## 2021-03-18 DIAGNOSIS — Z47.89 ORTHOPEDIC AFTERCARE: Primary | ICD-10-CM

## 2021-03-18 DIAGNOSIS — S46.119A SUBLUXATION OF TENDON OF LONG HEAD OF BICEPS: ICD-10-CM

## 2021-03-18 DIAGNOSIS — M54.2 NECK PAIN: ICD-10-CM

## 2021-03-18 DIAGNOSIS — M25.512 ACUTE PAIN OF LEFT SHOULDER: ICD-10-CM

## 2021-03-18 PROCEDURE — 97110 THERAPEUTIC EXERCISES: CPT | Performed by: PHYSICAL THERAPIST

## 2021-03-18 PROCEDURE — 97140 MANUAL THERAPY 1/> REGIONS: CPT | Performed by: PHYSICAL THERAPIST

## 2021-03-18 PROCEDURE — 97112 NEUROMUSCULAR REEDUCATION: CPT | Performed by: PHYSICAL THERAPIST

## 2021-03-18 NOTE — PROGRESS NOTES
Daily Note     Today's date: 3/18/2021  Patient name: Alisa Elaine  : 1970  MRN: [id number]  Referring provider: Delfina Hayward MD  Dx:   Encounter Diagnosis     ICD-10-CM    1  Orthopedic aftercare  Z47 89    2  Acute pain of left shoulder  M25 512    3  Neck pain  M54 2    4  Subluxation of tendon of long head of biceps, right shoulder  S46 119A                   Subjective: Pt reports progressive improvement in symptoms  Shoulder isn't bothering her too bad      Objective: See treatment diary below      Assessment: Pt has demonstrated excellent compliance to HEP  She is regaining more motor control of rotator cuff and scapular stabilizers  PROM is improving in all planes, included IR and horizontal abduction  Pt does experience discomfort at end range stretching  Tolerated treatment well  Patient exhibited good technique with therapeutic exercises and would benefit from continued PT      Plan: Continue per plan of care  Precautions:   Surgery: Surgical Arthroscopy Right Shoulder with Rotator Cuff Repair, Long Head Biceps Tenodesis, Posterior Glenoid Labrum Repair and Subacromial DecompressionSurgical Arthroscopy Right Shoulder with Rotator Cuff Repair, Long Head Biceps Tenodesis, Posterior Glenoid Labrum Repair and Subacromial Decompression on 1/15/21  *Pt is to follow the accelerated RTC repair protocol       Manuals 2/25 3/2 3/4 3/9 3/11 3/16 3/18      Shoulder stretching-Protocol 10' 10' 10' 4' 5'  5'      Right Scapular mobs    3' GrIII         STM to right UT and periscapular region             CPA of T/S             L UPA of C6,7               R GH joint mobs Gr III all planes 5'  Gr III all planes 5'  Gr III all planes 5'  Gr III all planes 3'  Gr III all planes 3'   Gr III all planes 3'                    Neuro Re-Ed             Scapular retraction             Scapular elevation/depression             Shoulder isometrics             Shoulder flexion with mirror (opp UE for assistance) 20x 20x 15x 15x        IR/ER TB     nv 2x10 ytb each 2x10 ytb each      TB row     nv 3x10 BTB 3x10 BTB      Horizontal ABD     nv Bent over nv      Ther Ex             Pendulums             Passive ER stretch with wand 20x5"  20x5" 20x5" Wedge into ER stretch 5x20"  Wedge into ER stretch 5x20"  Wedge into ER stretch 5x20"      PB shoulder flexion AAROM   20x          Serratus press AAROM             SL ER AROM  30x  3x10 3x10 3x10       SL abduction   20x 20x 15x elbow flexed  2x10        Shoulder flexion in supine     3x10 2x15 with PT assistance 2x15 with PT assistance 30x self      Pulley 5' 5' 5' 5' 5' 5'       Sleeper stretch  10x10"  5x20" 5x20" 5x20" 5x20" 5x20"       Shoulder extension stretch with wand 20x5" 20x5"  DC With horiz add 20x5" With horiz add 20x5" With horiz add 20x5"      IR BTB stretch with strap    10x5" 10x5" 10x5" 10x5"      Ther Activity                                       Gait Training                                       Modalities                                       1:1 with -542u

## 2021-03-23 ENCOUNTER — OFFICE VISIT (OUTPATIENT)
Dept: PHYSICAL THERAPY | Facility: CLINIC | Age: 51
End: 2021-03-23
Payer: COMMERCIAL

## 2021-03-23 DIAGNOSIS — M25.512 ACUTE PAIN OF LEFT SHOULDER: ICD-10-CM

## 2021-03-23 DIAGNOSIS — S46.119A SUBLUXATION OF TENDON OF LONG HEAD OF BICEPS: ICD-10-CM

## 2021-03-23 DIAGNOSIS — M54.2 NECK PAIN: ICD-10-CM

## 2021-03-23 DIAGNOSIS — Z47.89 ORTHOPEDIC AFTERCARE: Primary | ICD-10-CM

## 2021-03-23 PROCEDURE — 97112 NEUROMUSCULAR REEDUCATION: CPT | Performed by: PHYSICAL THERAPIST

## 2021-03-23 PROCEDURE — 97110 THERAPEUTIC EXERCISES: CPT | Performed by: PHYSICAL THERAPIST

## 2021-03-23 PROCEDURE — 97140 MANUAL THERAPY 1/> REGIONS: CPT | Performed by: PHYSICAL THERAPIST

## 2021-03-23 NOTE — PROGRESS NOTES
Daily Note     Today's date: 3/23/2021  Patient name: Christian Borja  : 1970  MRN: [id number]  Referring provider: Braden Zurita MD  Dx:   Encounter Diagnosis     ICD-10-CM    1  Orthopedic aftercare  Z47 89    2  Acute pain of left shoulder  M25 512    3  Neck pain  M54 2    4  Subluxation of tendon of long head of biceps, right shoulder  S46 119A        Start Time: 1745  Stop Time: 1830  Total time in clinic (min): 45 minutes    Subjective: Pt reports her shoulder is feeling better but not the scapular/posterior pain of the right shoulder  Objective: See treatment diary below      Assessment: Pt experienced a cavitation at end of treatment in R shoulder which help subsided symptoms  Initiated bicep curls and horizontal shoulder abduction  Tolerated treatment fair  Scapular and posterior shoulder discomfort limited manual stretching and exercise performance  Pt now in 10 week of protocol and advancing well overall despite posterior shoulder discomfort  Patient would benefit from continued PT  Plan: Continue per plan of care  Precautions:   Surgery: Surgical Arthroscopy Right Shoulder with Rotator Cuff Repair, Long Head Biceps Tenodesis, Posterior Glenoid Labrum Repair and Subacromial DecompressionSurgical Arthroscopy Right Shoulder with Rotator Cuff Repair, Long Head Biceps Tenodesis, Posterior Glenoid Labrum Repair and Subacromial Decompression on 1/15/21  *Pt is to follow the accelerated RTC repair protocol       Manuals 2/25 3/2 3/4 3/9 3/11 3/16 3/18 3/23     Shoulder stretching-Protocol 10' 10' 10' 4' 5'  5' 5'     Right Scapular mobs    3' GrIII         STM to right UT and periscapular region             CPA of T/S             L UPA of C6,7               R GH joint mobs Gr III all planes 5'  Gr III all planes 5'  Gr III all planes 5'  Gr III all planes 3'  Gr III all planes 3'   Gr III all planes 3'  Gr III all planes 3'                   Neuro Re-Ed             Scapular retraction Scapular elevation/depression             Shoulder isometrics             Shoulder flexion with mirror (opp UE for assistance)  20x 20x 15x 15x        IR/ER TB     nv 2x10 ytb each 2x10 ytb each 2x10 ytb each     TB row     nv 3x10 BTB 3x10 BTB 3x10 BTB     Horizontal ABD     nv Bent over nv 30x     Ther Ex             Pendulums             Passive ER stretch with wand 20x5"  20x5" 20x5" Wedge into ER stretch 5x20"  Wedge into ER stretch 5x20"  Wedge into ER stretch 5x20" Wedge into ER stretch 5x20"     Bicep curls        2x10 5#     Serratus press AAROM             SL ER AROM  30x  3x10 3x10 3x10       SL abduction   20x 20x 15x elbow flexed  2x10        Shoulder flexion in supine     3x10 2x15 with PT assistance 2x15 with PT assistance 30x self      Pulley 5' 5' 5' 5' 5' 5'       Sleeper stretch  10x10"  5x20" 5x20" 5x20" 5x20" 5x20"       Shoulder extension stretch with wand 20x5" 20x5"  DC With horiz add 20x5" With horiz add 20x5" With horiz add 20x5" 10x10"     IR BTB stretch with strap    10x5" 10x5" 10x5" 10x5" 10x10"     Ther Activity                                       Gait Training                                       Modalities                                       1:1 with -526h

## 2021-03-25 ENCOUNTER — APPOINTMENT (OUTPATIENT)
Dept: PHYSICAL THERAPY | Facility: CLINIC | Age: 51
End: 2021-03-25
Payer: COMMERCIAL

## 2021-03-30 ENCOUNTER — APPOINTMENT (OUTPATIENT)
Dept: PHYSICAL THERAPY | Facility: CLINIC | Age: 51
End: 2021-03-30
Payer: COMMERCIAL

## 2021-04-01 ENCOUNTER — OFFICE VISIT (OUTPATIENT)
Dept: PHYSICAL THERAPY | Facility: CLINIC | Age: 51
End: 2021-04-01
Payer: COMMERCIAL

## 2021-04-01 ENCOUNTER — APPOINTMENT (OUTPATIENT)
Dept: PHYSICAL THERAPY | Facility: CLINIC | Age: 51
End: 2021-04-01
Payer: COMMERCIAL

## 2021-04-01 DIAGNOSIS — M25.512 ACUTE PAIN OF LEFT SHOULDER: ICD-10-CM

## 2021-04-01 DIAGNOSIS — S46.119A SUBLUXATION OF TENDON OF LONG HEAD OF BICEPS: ICD-10-CM

## 2021-04-01 DIAGNOSIS — M54.2 NECK PAIN: ICD-10-CM

## 2021-04-01 DIAGNOSIS — Z47.89 ORTHOPEDIC AFTERCARE: Primary | ICD-10-CM

## 2021-04-01 PROCEDURE — 97112 NEUROMUSCULAR REEDUCATION: CPT | Performed by: PHYSICAL THERAPIST

## 2021-04-01 PROCEDURE — 97110 THERAPEUTIC EXERCISES: CPT | Performed by: PHYSICAL THERAPIST

## 2021-04-01 PROCEDURE — 97140 MANUAL THERAPY 1/> REGIONS: CPT | Performed by: PHYSICAL THERAPIST

## 2021-04-01 NOTE — PROGRESS NOTES
Daily Note     Today's date: 2021  Patient name: Lizette Aguiar  : 1970  MRN: [id number]  Referring provider: Joy Montejo MD  Dx:   Encounter Diagnosis     ICD-10-CM    1  Orthopedic aftercare  Z47 89    2  Acute pain of left shoulder  M25 512    3  Neck pain  M54 2    4  Subluxation of tendon of long head of biceps, right shoulder  S46 119A        Start Time: 1740  Stop Time: 1830  Total time in clinic (min): 50 minutes    Subjective: Pt continues to c/o posterior shoulder discomfort  Objective: See treatment diary below      Assessment: Tolerated treatment well  Motor control of glenohumeral joint is improving  Added 1 lbs with horizontal abduction  Patient demonstrated fatigue post treatment and exhibited good technique with therapeutic exercises  Plan: Continue per plan of care  Precautions:   Surgery: Surgical Arthroscopy Right Shoulder with Rotator Cuff Repair, Long Head Biceps Tenodesis, Posterior Glenoid Labrum Repair and Subacromial DecompressionSurgical Arthroscopy Right Shoulder with Rotator Cuff Repair, Long Head Biceps Tenodesis, Posterior Glenoid Labrum Repair and Subacromial Decompression on 1/15/21  *Pt is to follow the accelerated RTC repair protocol       Manuals 2/25 3/2 3/4 3/9 3/11 3/16 3/18 3/23 4/1    Shoulder stretching-Protocol 10' 10' 10' 4' 5'  5' 5' 5'    Right Scapular mobs    3' GrIII         STM to right UT and periscapular region             CPA of T/S             L UPA of C6,7               R GH joint mobs Gr III all planes 5'  Gr III all planes 5'  Gr III all planes 5'  Gr III all planes 3'  Gr III all planes 3'   Gr III all planes 3'  Gr III all planes 3'  Gr III all planes 5'                  Neuro Re-Ed             Scapular retraction             Scapular elevation/depression             Shoulder isometrics             Shoulder flexion with mirror (opp UE for assistance)  20x 20x 15x 15x        Shoulder flexion with scapular depression         30x IR/ER TB     nv 2x10 ytb each 2x10 ytb each 2x10 ytb each 3x10 rtb each    TB row     nv 3x10 BTB 3x10 BTB 3x10 BTB     Horizontal ABD     nv Bent over nv 30x 2x10 1#    Ther Ex             Pendulums             Passive ER stretch with wand 20x5"  20x5" 20x5" Wedge into ER stretch 5x20"  Wedge into ER stretch 5x20"  Wedge into ER stretch 5x20" Wedge into ER stretch 5x20" Wedge into ER stretch 5x20"    Bicep curls        2x10 5# 3x10 5#    Serratus press AAROM             SL ER AROM  30x  3x10 3x10 3x10       SL abduction   20x 20x 15x elbow flexed  2x10        Shoulder flexion in supine     3x10 2x15 with PT assistance 2x15 with PT assistance 30x self      Pulley 5' 5' 5' 5' 5' 5'   5'    Sleeper stretch  10x10"  5x20" 5x20" 5x20" 5x20" 5x20"       Shoulder extension stretch with wand 20x5" 20x5"  DC With horiz add 20x5" With horiz add 20x5" With horiz add 20x5" 10x10"     IR BTB stretch with strap    10x5" 10x5" 10x5" 10x5" 10x10" 10x10"    Ther Activity                                       Gait Training                                       Modalities                                       1:1 with -419

## 2021-04-08 ENCOUNTER — APPOINTMENT (OUTPATIENT)
Dept: PHYSICAL THERAPY | Facility: CLINIC | Age: 51
End: 2021-04-08
Payer: COMMERCIAL

## 2021-04-09 ENCOUNTER — OFFICE VISIT (OUTPATIENT)
Dept: PHYSICAL THERAPY | Facility: CLINIC | Age: 51
End: 2021-04-09
Payer: COMMERCIAL

## 2021-04-09 DIAGNOSIS — S46.119A SUBLUXATION OF TENDON OF LONG HEAD OF BICEPS: ICD-10-CM

## 2021-04-09 DIAGNOSIS — M54.2 NECK PAIN: ICD-10-CM

## 2021-04-09 DIAGNOSIS — Z47.89 ORTHOPEDIC AFTERCARE: Primary | ICD-10-CM

## 2021-04-09 DIAGNOSIS — M25.512 ACUTE PAIN OF LEFT SHOULDER: ICD-10-CM

## 2021-04-09 PROCEDURE — 97140 MANUAL THERAPY 1/> REGIONS: CPT | Performed by: PHYSICAL THERAPIST

## 2021-04-09 PROCEDURE — 97112 NEUROMUSCULAR REEDUCATION: CPT | Performed by: PHYSICAL THERAPIST

## 2021-04-09 PROCEDURE — 97110 THERAPEUTIC EXERCISES: CPT | Performed by: PHYSICAL THERAPIST

## 2021-04-09 NOTE — PROGRESS NOTES
[id number]     Today's date: 2021  Patient name: Shonda Moran  : 1970  MRN: [id number]  Referring provider: Girish Benitez MD  Dx:   Encounter Diagnosis     ICD-10-CM    1  Orthopedic aftercare  Z47 89    2  Acute pain of left shoulder  M25 512    3  Neck pain  M54 2    4  Subluxation of tendon of long head of biceps, right shoulder  S46 119A        Start Time: 0730  Stop Time: 0815  Total time in clinic (min): 45 minutes    Assessment  Assessment details: Patient is s/p 12 weeks right rotator cuff repair and biceps tenodesis on 1/15/21 with Dr Noni Oneal  Patient is doing well with post operative rehabilitation program  Pt continues to have moderate shoulder stiffness in all planes  Pt's chief complaint is posterior shoulder pain which is limiting progression in the rehabilitation program  Pt's passive and active shoulder motion has improved in all planes of motion  She demonstrates improved motor control during elevation, although mild shoulder hiking and superior migration of the humeral head is present  Strength of the shoulder is grossly 4-/5  Pt admits to being aggressive with her HEP where she needs at least 1 day rest  Due to stiffness, patient should look to slowly perform exercises, daily, in a comfortable manner to improve ROM and reduce overall shoulder stiffness  Pt is agreeable to plan going forward  Pt will benefit from skill PT to address ROM, weakness, and improve overall function  Thank you for this referral    Impairments: abnormal muscle tone, abnormal or restricted ROM, abnormal movement and impaired physical strength  Understanding of Dx/Px/POC: good   Prognosis: good    Goals  Short Term Goals: to be achieved by 4 weeks  1) Patient to be independent with basic HEP -MET  2) Decrease pain to 1 and 3/10 at its worst -MET  3) Increase shoulder PROM by 5-10 degrees-MET     Long Term Goals: to be achieved by discharge  1) FOTO equal to or greater than expected  -Partially met  2) Patient to be independent with comprehensive HEP  -Partially met  3) Abolish pain for improved quality of life -Partially met  4) Increase shoulder ROM to within functional limits to improve a/iadls -Partially met  5) Increase shoulder strength to 5/5 MMT grade in all planes to improve a/iadls  -Partially met  6) Patient to return to full duty at work  -MET    Plan  Patient would benefit from: skilled PT  Planned modality interventions: cryotherapy and TENS  Planned therapy interventions: manual therapy, neuromuscular re-education, patient education, therapeutic activities, therapeutic exercise and functional ROM exercises  Frequency: 1x per week for 6 weeks  Treatment plan discussed with: patient        Subjective Evaluation    History of Present Illness  Mechanism of injury: surgery  Mechanism of injury: History of Current Injury: 3 days s/p Surgical Arthroscopy Right Shoulder with Rotator Cuff Repair, Long Head Biceps Tenodesis, Posterior Glenoid Labrum Repair and Subacromial Decompression on 1/15/21  Pt believes her shoulder pain started after a fall at [employer]  Pt has a previous history of neck pain which is now aggravated  12 years ago, patient had bilateral labral repairs  Pain location/Descriptors: posterior shoulder   Special Questions: Mild numbness in hand   Patient goals:  Return to active lifestyle  Hobbies/Interest: goes to the gym to exercise, jason  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, computer work  Pt reacts to the cold and can break out in hives with ice  Pain  Current pain ratin  At best pain ratin  At worst pain ratin    Hand dominance: right    Treatments  No previous or current treatments  Patient Goals  Patient goals for therapy: decreased pain, increased motion, increased strength, independence with ADLs/IADLs, return to sport/leisure activities and return to work          Objective     Observations   Left Shoulder   Negative for incision  Additional Observation Details  Incision intact with no observable sign of infection    Palpation     Additional Palpation Details  Tenderness around entire shoulder girdle of R shoulder    Active Range of Motion     Right Shoulder   Flexion: 130 (superior migration of humeral head and scapular hiking) degrees   Extension: 55 degrees   Abduction: 130 (Superior migration of humeral head) degrees   External rotation 0°: 70 degrees     Right Wrist   Normal active range of motion    Additional Active Range of Motion Details  Normal right wrist AROM    Passive Range of Motion     Right Shoulder   Flexion: 150 degrees   Abduction: 110 degrees   External rotation 45°: 70 degrees   Internal rotation 45°: 50 degrees     Right Elbow   Flexion: 140 degrees   Extension: 0 degrees     Additional Passive Range of Motion Details  Not tested today     Strength/Myotome Testing     Right Shoulder     Planes of Motion   Flexion: 4-   Abduction: 4-   External rotation at 0°: 4   Internal rotation at 0°: 4     Right Wrist/Hand   Wrist extension: 4  Wrist flexion: 4    Additional Strength Details   strength:   Right- 50#  Left- 65#    General Comments:      Shoulder Comments   Pt is neurovascularly intact in RUE      Flowsheet Rows      Most Recent Value   PT/OT G-Codes   Current Score  70   Projected Score  39             Precautions:   Surgery: Surgical Arthroscopy Right Shoulder with Rotator Cuff Repair, Long Head Biceps Tenodesis, Posterior Glenoid Labrum Repair and Subacromial DecompressionSurgical Arthroscopy Right Shoulder with Rotator Cuff Repair, Long Head Biceps Tenodesis, Posterior Glenoid Labrum Repair and Subacromial Decompression on 1/15/21  *Pt is to follow the accelerated RTC repair protocol         Manuals 2/25 3/2 3/4 3/9 3/11 3/16 3/18 3/23 4/1 4/9   Shoulder stretching-Protocol 10' 10' 10' 4' 5'  5' 5' 5' 10'   Right Scapular mobs    3' GrIII         STM to right UT and periscapular region             CPA of T/S             L UPA of C6,7               R GH joint mobs Gr III all planes 5'  Gr III all planes 5'  Gr III all planes 5'  Gr III all planes 3'  Gr III all planes 3'   Gr III all planes 3'  Gr III all planes 3'  Gr III all planes 5'  Gr III all planes 5'                 Neuro Re-Ed             Scapular retraction             Scapular elevation/depression             Shoulder isometrics             Shoulder flexion with mirror (opp UE for assistance)  20x 20x 15x 15x        Shoulder flexion with scapular depression         30x    IR/ER TB     nv 2x10 ytb each 2x10 ytb each 2x10 ytb each 3x10 rtb each    TB row     nv 3x10 BTB 3x10 BTB 3x10 BTB     Horizontal ABD     nv Bent over nv 30x 2x10 1#    Ther Ex             Pendulums             Passive ER stretch with wand 20x5"  20x5" 20x5" Wedge into ER stretch 5x20"  Wedge into ER stretch 5x20"  Wedge into ER stretch 5x20" Wedge into ER stretch 5x20" Wedge into ER stretch 5x20" 20x5"    Bicep curls        2x10 5# 3x10 5#    Serratus press AAROM             SL ER AROM  30x  3x10 3x10 3x10       SL abduction   20x 20x 15x elbow flexed  2x10        Shoulder flexion in supine     3x10 2x15 with PT assistance 2x15 with PT assistance 30x self      Pulley 5' 5' 5' 5' 5' 5'   5' 5'   Sleeper stretch  10x10"  5x20" 5x20" 5x20" 5x20" 5x20"       Shoulder extension stretch with wand 20x5" 20x5"  DC With horiz add 20x5" With horiz add 20x5" With horiz add 20x5" 10x10"     IR BTB stretch with strap    10x5" 10x5" 10x5" 10x5" 10x10" 10x10"    Ther Activity                                       Gait Training                                       Modalities                                       1:1 with -815  Pt was re-evaluated today x 30 minutes

## 2021-04-15 ENCOUNTER — APPOINTMENT (OUTPATIENT)
Dept: PHYSICAL THERAPY | Facility: CLINIC | Age: 51
End: 2021-04-15
Payer: COMMERCIAL

## 2021-04-22 ENCOUNTER — OFFICE VISIT (OUTPATIENT)
Dept: PHYSICAL THERAPY | Facility: CLINIC | Age: 51
End: 2021-04-22
Payer: COMMERCIAL

## 2021-04-22 DIAGNOSIS — Z47.89 ORTHOPEDIC AFTERCARE: Primary | ICD-10-CM

## 2021-04-22 DIAGNOSIS — M25.512 ACUTE PAIN OF LEFT SHOULDER: ICD-10-CM

## 2021-04-22 DIAGNOSIS — M54.2 NECK PAIN: ICD-10-CM

## 2021-04-22 DIAGNOSIS — S46.119A SUBLUXATION OF TENDON OF LONG HEAD OF BICEPS: ICD-10-CM

## 2021-04-22 PROCEDURE — 97112 NEUROMUSCULAR REEDUCATION: CPT | Performed by: PHYSICAL THERAPIST

## 2021-04-22 PROCEDURE — 97530 THERAPEUTIC ACTIVITIES: CPT | Performed by: PHYSICAL THERAPIST

## 2021-04-22 PROCEDURE — 97140 MANUAL THERAPY 1/> REGIONS: CPT | Performed by: PHYSICAL THERAPIST

## 2021-04-22 NOTE — PROGRESS NOTES
Daily Note     Today's date: 2021  Patient name: Joseph Pelaez  : 1970  MRN: [id number]  Referring provider: Christina Sandhu MD  Dx:   Encounter Diagnosis     ICD-10-CM    1  Orthopedic aftercare  Z47 89    2  Acute pain of left shoulder  M25 512    3  Neck pain  M54 2    4  Subluxation of tendon of long head of biceps, right shoulder  S46 119A        Start Time: 1730  Stop Time: 1815  Total time in clinic (min): 45 minutes    Subjective: Pt has been performing her HEP and biking at home  She reports having a migraine  Objective: See treatment diary below      Assessment: Pt is 3 months post op  Pt has not had many PT sessions over the course of the last month but does remain diligent to HEP  Pt's shoulder still resembles that of adhesive capsulitis as ROM continues to be limit in all planes  Most of patient's pain is not at shoulder but near the scapula itself  Primary location of pain is the latissimus dorsi  Progressed strengthening program and tolerated well  Advanced POC in today's session  Patient demonstrated fatigue post treatment and would benefit from continued PT  Plan: Continue per plan of care  Precautions:   Surgery: Surgical Arthroscopy Right Shoulder with Rotator Cuff Repair, Long Head Biceps Tenodesis, Posterior Glenoid Labrum Repair and Subacromial DecompressionSurgical Arthroscopy Right Shoulder with Rotator Cuff Repair, Long Head Biceps Tenodesis, Posterior Glenoid Labrum Repair and Subacromial Decompression on 1/15/21  *Pt is to follow the accelerated RTC repair protocol         Manuals 4/22    3/11 3/16 3/18 3/23 4/1 4/9   Shoulder stretching-Protocol 5'    5'  5' 5' 5' 10'   Right Scapular mobs             STM to right UT and periscapular region             CPA of T/S             L UPA of C6,7               R GH joint mobs Gr III all planes 3'     Gr III all planes 3'   Gr III all planes 3'  Gr III all planes 3'  Gr III all planes 5'  Gr III all planes 5' Tigger point release to Latissimus 4'            Neuro Re-Ed             Scapular retraction             Scapular elevation/depression             Shoulder isometrics             Shoulder flexion with mirror (opp UE for assistance)     15x        Shoulder flexion with scapular depression         30x    IR/ER TB At 75 of ABD 2x10 ytb    nv 2x10 ytb each 2x10 ytb each 2x10 ytb each 3x10 rtb each    TB row     nv 3x10 BTB 3x10 BTB 3x10 BTB     Horizontal ABD     nv Bent over nv 30x 2x10 1#    D1/D2 extension 2x10 ytb each            Ther Ex             Pendulums             Passive ER stretch with wand     Wedge into ER stretch 5x20"  Wedge into ER stretch 5x20"  Wedge into ER stretch 5x20" Wedge into ER stretch 5x20" Wedge into ER stretch 5x20" 20x5"    Bicep curls        2x10 5# 3x10 5#    Serratus press AAROM             SL ER AROM     3x10 3x10       SL abduction      2x10        Shoulder flexion in supine      2x15 with PT assistance 2x15 with PT assistance 30x self      Pulley     5' 5'   5' 5'   Sleeper stretch     5x20" 5x20" 5x20"       Shoulder extension stretch with wand     With horiz add 20x5" With horiz add 20x5" With horiz add 20x5" 10x10"     IR BTB stretch with strap     10x5" 10x5" 10x5" 10x10" 10x10"    UBE 3'/3' lvl 3             Ther Activity             IR ball toss 30x grn            ER ball toss 30x grn            Body Blade A/P,  5x10" each            Gait Training                                       Modalities                                       1:1 with -815  Pt was re-evaluated today x 30 minutes

## 2021-04-29 ENCOUNTER — OFFICE VISIT (OUTPATIENT)
Dept: PHYSICAL THERAPY | Facility: CLINIC | Age: 51
End: 2021-04-29
Payer: COMMERCIAL

## 2021-04-29 DIAGNOSIS — S46.119A SUBLUXATION OF TENDON OF LONG HEAD OF BICEPS: ICD-10-CM

## 2021-04-29 DIAGNOSIS — M25.512 ACUTE PAIN OF LEFT SHOULDER: ICD-10-CM

## 2021-04-29 DIAGNOSIS — Z47.89 ORTHOPEDIC AFTERCARE: Primary | ICD-10-CM

## 2021-04-29 DIAGNOSIS — M54.2 NECK PAIN: ICD-10-CM

## 2021-04-29 PROCEDURE — 97140 MANUAL THERAPY 1/> REGIONS: CPT | Performed by: PHYSICAL THERAPIST

## 2021-04-29 PROCEDURE — 97110 THERAPEUTIC EXERCISES: CPT | Performed by: PHYSICAL THERAPIST

## 2021-04-29 PROCEDURE — 97530 THERAPEUTIC ACTIVITIES: CPT | Performed by: PHYSICAL THERAPIST

## 2021-04-29 NOTE — PROGRESS NOTES
Daily Note     Today's date: 2021  Patient name: Mónica Ziegler  : 1970  MRN: [id number]  Referring provider: Jef Hall MD  Dx:   Encounter Diagnosis     ICD-10-CM    1  Orthopedic aftercare  Z47 89    2  Acute pain of left shoulder  M25 512    3  Neck pain  M54 2    4  Subluxation of tendon of long head of biceps, right shoulder  S46 119A        Start Time: 173  Stop Time:   Total time in clinic (min): 38 minutes    Subjective: Pt reports improving shoulder motion  She states "I feel like I turned the page "       Objective: See treatment diary below      Assessment: 15 weeks post op  Performed IASTM using LLL at latissimus on the right side which helped reduce sensitivity  Tolerated treatment well  Patient demonstrated fatigue post treatment and exhibited good technique with therapeutic exercises  Plan: Continue per plan of care  Precautions:   Surgery: Surgical Arthroscopy Right Shoulder with Rotator Cuff Repair, Long Head Biceps Tenodesis, Posterior Glenoid Labrum Repair and Subacromial DecompressionSurgical Arthroscopy Right Shoulder with Rotator Cuff Repair, Long Head Biceps Tenodesis, Posterior Glenoid Labrum Repair and Subacromial Decompression on 1/15/21  *Pt is to follow the accelerated RTC repair protocol         Manuals 4/22 4/29   3/11 3/16 3/18 3/23 4/1 4/9   Shoulder stretching-Protocol 5' 5'   5'  5' 5' 5' 10'   Right Scapular mobs             STM to right UT and periscapular region             CPA of T/S             L UPA of C6,7               R GH joint mobs Gr III all planes 3'  Gr III all planes 3'    Gr III all planes 3'   Gr III all planes 3'  Gr III all planes 3'  Gr III all planes 5'  Gr III all planes 5'    Tigger point release to Latissimus 4' 4' with LLL 1,700J            Neuro Re-Ed             Scapular retraction             Scapular elevation/depression             Shoulder isometrics             Shoulder flexion with mirror (opp UE for assistance) 15x        Shoulder flexion with scapular depression         30x    IR/ER TB At 75 of ABD 2x10 ytb    nv 2x10 ytb each 2x10 ytb each 2x10 ytb each 3x10 rtb each    TB row     nv 3x10 BTB 3x10 BTB 3x10 BTB     Horizontal ABD     nv Bent over nv 30x 2x10 1#    D1/D2 extension 2x10 ytb each 2x10 ytb each           Ther Ex             Pendulums             Passive ER stretch with wand     Wedge into ER stretch 5x20"  Wedge into ER stretch 5x20"  Wedge into ER stretch 5x20" Wedge into ER stretch 5x20" Wedge into ER stretch 5x20" 20x5"    Bicep curls        2x10 5# 3x10 5#    Serratus press AAROM             SL ER AROM     3x10 3x10       SL abduction      2x10        Shoulder flexion in supine      2x15 with PT assistance 2x15 with PT assistance 30x self      Pulley     5' 5'   5' 5'   Sleeper stretch     5x20" 5x20" 5x20"       Shoulder extension stretch with wand     With horiz add 20x5" With horiz add 20x5" With horiz add 20x5" 10x10"     IR BTB stretch with strap     10x5" 10x5" 10x5" 10x10" 10x10"    UBE 3'/3' lvl 3  3'/3' lvl 3            Ther Activity             IR ball toss 30x grn 30x grn           ER ball toss 30x grn 30x grn           Body Blade A/P,  5x10" each A/P,  5x10" each           Dart throw  30x grn            Gait Training                                       Modalities                                       1:1 with -879  Pt was re-evaluated today x 30 minutes

## 2021-05-03 ENCOUNTER — OFFICE VISIT (OUTPATIENT)
Dept: OBGYN CLINIC | Facility: OTHER | Age: 51
End: 2021-05-03
Payer: COMMERCIAL

## 2021-05-03 VITALS
BODY MASS INDEX: 24.37 KG/M2 | WEIGHT: 151 LBS | SYSTOLIC BLOOD PRESSURE: 129 MMHG | DIASTOLIC BLOOD PRESSURE: 87 MMHG | HEART RATE: 72 BPM

## 2021-05-03 DIAGNOSIS — M75.101 TEAR OF RIGHT SUPRASPINATUS TENDON: ICD-10-CM

## 2021-05-03 DIAGNOSIS — S46.119A SUBLUXATION OF TENDON OF LONG HEAD OF BICEPS: Primary | ICD-10-CM

## 2021-05-03 DIAGNOSIS — Z47.89 AFTERCARE FOLLOWING SURGERY OF THE MUSCULOSKELETAL SYSTEM: ICD-10-CM

## 2021-05-03 PROCEDURE — 99213 OFFICE O/P EST LOW 20 MIN: CPT | Performed by: PHYSICIAN ASSISTANT

## 2021-05-03 NOTE — PROGRESS NOTES
Assessment  Diagnoses and all orders for this visit:    Subluxation of tendon of long head of biceps, right shoulder    Tear of right supraspinatus tendon    Aftercare following surgery of the musculoskeletal system        Discussion and Plan:    1  Continue with formal physical therapy - transition to independent HEP  2  HEP as instructed by physical therapy - focus on stretching  3  Follow up in 8 weeks  4  Slowly increase activities to tolerance  5  Tylenol and Ice if needed for pain    Subjective:   Patient ID: Koki Hallman is a 45 y o  female      Michael Mcginnis presents to the office nearly 4 months from arthroscopic rotator cuff repair, bicep tenodesis and posterior labral repair  At last office visit, Michael Mcginnis was pretty stiff (only 90 of FF)  She has worked hard in therapy over the last 2 months and has been compliant with her HEP  Michael Mcginnis states most pain occurs in her latissimus with motion  She is able to perform ADLs without significant pain  Denies pain that interferes with sleep  She denies new injury or trauma  The following portions of the patient's history were reviewed and updated as appropriate: allergies, current medications, past family history, past medical history, past social history, past surgical history and problem list     Review of Systems   Constitutional: Negative for chills and fever  HENT: Negative for hearing loss  Eyes: Negative for visual disturbance  Respiratory: Negative for shortness of breath  Cardiovascular: Negative for chest pain  Gastrointestinal: Negative for abdominal pain  Musculoskeletal:        As reviewed in the HPI   Skin: Negative for rash  Neurological:        As reviewed in the HPI   Psychiatric/Behavioral: Negative for agitation         Objective:  /87 (BP Location: Left arm, Patient Position: Sitting, Cuff Size: Standard)   Pulse 72   Wt 68 5 kg (151 lb)   BMI 24 37 kg/m²       Right Shoulder Exam     Range of Motion   Active abduction: 60 Passive abduction: 80   External rotation: 40   Forward flexion: 140   Internal rotation 0 degrees: Lumbar     Muscle Strength   External rotation: 5/5   Supraspinatus: 5/5     Tests   Drop arm: negative    Other   Erythema: absent  Sensation: normal  Pulse: present            Physical Exam  Constitutional:       Appearance: She is well-developed  HENT:      Head: Normocephalic  Neck:      Musculoskeletal: Normal range of motion  Pulmonary:      Breath sounds: Normal breath sounds  No wheezing  Skin:     General: Skin is warm and dry  Neurological:      Mental Status: She is alert and oriented to person, place, and time  Psychiatric:         Behavior: Behavior normal          Thought Content:  Thought content normal          Judgment: Judgment normal

## 2021-05-06 ENCOUNTER — APPOINTMENT (OUTPATIENT)
Dept: PHYSICAL THERAPY | Facility: CLINIC | Age: 51
End: 2021-05-06
Payer: COMMERCIAL

## 2021-05-13 ENCOUNTER — OFFICE VISIT (OUTPATIENT)
Dept: PHYSICAL THERAPY | Facility: CLINIC | Age: 51
End: 2021-05-13
Payer: COMMERCIAL

## 2021-05-13 DIAGNOSIS — M25.512 ACUTE PAIN OF LEFT SHOULDER: ICD-10-CM

## 2021-05-13 DIAGNOSIS — M54.2 NECK PAIN: ICD-10-CM

## 2021-05-13 DIAGNOSIS — Z47.89 ORTHOPEDIC AFTERCARE: Primary | ICD-10-CM

## 2021-05-13 PROCEDURE — 97110 THERAPEUTIC EXERCISES: CPT | Performed by: PHYSICAL THERAPIST

## 2021-05-13 PROCEDURE — 97140 MANUAL THERAPY 1/> REGIONS: CPT | Performed by: PHYSICAL THERAPIST

## 2021-05-13 NOTE — PROGRESS NOTES
Daily Note     Today's date: 2021  Patient name: Twila Primrose  : 1970  MRN: [id number]  Referring provider: Michelle Bunch MD  Dx:   Encounter Diagnosis     ICD-10-CM    1  Orthopedic aftercare  Z47 89    2  Acute pain of left shoulder  M25 512    3  Neck pain  M54 2                   Subjective: Pt followed up with Dr Keily Lobato  Would like patient to continue with HEP and formal PT for stretching  Objective: See treatment diary below      Assessment: Pt is 4 months post op  Continued discomfort in latissimus region on the right shoulder  Pt has not had treatment for over 2 weeks and demonstrates moderate shoulder stiffness  Pt was unable to perform comprehensive home exercise program as she had to travel for work  Pt demonstrates pain relief with LLL IASTM to Lat and scapular region  Tolerated treatment well  Patient would benefit from continued PT  Plan: Continue per plan of care  Precautions:   Surgery: Surgical Arthroscopy Right Shoulder with Rotator Cuff Repair, Long Head Biceps Tenodesis, Posterior Glenoid Labrum Repair and Subacromial DecompressionSurgical Arthroscopy Right Shoulder with Rotator Cuff Repair, Long Head Biceps Tenodesis, Posterior Glenoid Labrum Repair and Subacromial Decompression on 1/15/21  *Pt is to follow the accelerated RTC repair protocol         Manuals           Shoulder stretching-Protocol 5' 5' 3'          Right Scapular mobs             STM to right UT and periscapular region             CPA of T/S             L UPA of C6,7              R GH joint mobs Gr III all planes 3'  Gr III all planes 3'  Gr III all planes 3'           Tigger point release to Latissimus 4' 4' with LLL 1,700J  4' with LLL           Neuro Re-Ed             Scapular retraction             Scapular elevation/depression             Shoulder isometrics             Shoulder flexion with mirror (opp UE for assistance)             Shoulder flexion with scapular depression IR/ER TB At 76 of ABD 2x10 ytb  3x10 ytb          TB row             Horizontal ABD             D1/D2 extension 2x10 ytb each 2x10 ytb each           Ther Ex             Pendulums             Passive ER stretch with wand             Bicep curls             Serratus press AAROM             SL ER AROM             SL abduction              Shoulder flexion in supine              Pulley             Shoulder abduction finger ladder   20x10"          Sleeper stretch   10x10"          Shoulder extension stretch with wand             IR BTB stretch with strap             UBE 3'/3' lvl 3  3'/3' lvl 3  3'/3' lvl 3          Ther Activity             IR ball toss 30x grn 30x grn           ER ball toss 30x grn 30x grn           Body Blade A/P,  5x10" each A/P,  5x10" each           Dart throw  30x grn            Gait Training                                       Modalities                 15' R shoulder                       1:1 with -557

## 2021-05-20 ENCOUNTER — APPOINTMENT (OUTPATIENT)
Dept: PHYSICAL THERAPY | Facility: CLINIC | Age: 51
End: 2021-05-20
Payer: COMMERCIAL

## 2021-05-27 ENCOUNTER — APPOINTMENT (OUTPATIENT)
Dept: PHYSICAL THERAPY | Facility: CLINIC | Age: 51
End: 2021-05-27
Payer: COMMERCIAL

## 2021-06-23 NOTE — PROGRESS NOTES
PT-Discharge:   Patient has not attended PT over the past 6 weeks  D/C formal PT at this time  Pt was progressing nicely with post op rehabilitation program except for the pain she was experiencing in the latissimus region

## 2021-07-08 ENCOUNTER — OFFICE VISIT (OUTPATIENT)
Dept: OBGYN CLINIC | Facility: OTHER | Age: 51
End: 2021-07-08
Payer: COMMERCIAL

## 2021-07-08 VITALS
SYSTOLIC BLOOD PRESSURE: 115 MMHG | HEIGHT: 66 IN | WEIGHT: 151.01 LBS | HEART RATE: 64 BPM | BODY MASS INDEX: 24.27 KG/M2 | DIASTOLIC BLOOD PRESSURE: 77 MMHG

## 2021-07-08 DIAGNOSIS — Z47.89 AFTERCARE FOLLOWING SURGERY OF THE MUSCULOSKELETAL SYSTEM: ICD-10-CM

## 2021-07-08 DIAGNOSIS — M75.101 TEAR OF RIGHT SUPRASPINATUS TENDON: Primary | ICD-10-CM

## 2021-07-08 PROCEDURE — 99213 OFFICE O/P EST LOW 20 MIN: CPT | Performed by: PHYSICIAN ASSISTANT

## 2021-07-08 NOTE — PROGRESS NOTES
Assessment  Diagnoses and all orders for this visit:    Tear of right supraspinatus tendon    Aftercare following surgery of the musculoskeletal system        Discussion and Plan:    Julianna Mason is making good progress  She will continue with her rehab on her own at home  1  Continue with HEP - stretching and strengthening to tolerance  2  Slowly increase all activities to tolerance  Advised caution with overhead lifting  3  Tylenol PRN  4  Follow up as needed    Subjective:   Patient ID: Kendell Goode is a 45 y o  female      Pt presents the office in follow up of the right shoulder  She is nearly 6 months from arthroscopic rotator cuff repair  She is doing well  Stiffness is improving  She continues to work on her exercises on her own at home - She is stretching and strengthening using bands and weights  She is eager to get back into the gym and has questions about certain exercises  She denies pain at rest or at night  She is able to perform ADLs without limitation  Denies numbness or tingling  Still has some stiffness at end       The following portions of the patient's history were reviewed and updated as appropriate: allergies, current medications, past family history, past medical history, past social history, past surgical history and problem list     Review of Systems   Constitutional: Negative for chills and fever  HENT: Negative for hearing loss  Eyes: Negative for visual disturbance  Respiratory: Negative for shortness of breath  Cardiovascular: Negative for chest pain  Gastrointestinal: Negative for abdominal pain  Musculoskeletal:        As reviewed in the HPI   Skin: Negative for rash  Neurological:        As reviewed in the HPI   Psychiatric/Behavioral: Negative for agitation         Objective:  /77 (BP Location: Left arm, Patient Position: Sitting, Cuff Size: Standard)   Pulse 64   Ht 5' 6" (1 676 m)   Wt 68 5 kg (151 lb 0 2 oz)   BMI 24 37 kg/m²       Right Shoulder Exam     Tenderness   The patient is experiencing no tenderness  Range of Motion   Passive abduction: 90   External rotation: 60   Forward flexion: 160     Muscle Strength   External rotation: 5/5   Supraspinatus: 5/5     Tests   Bell test: negative  Impingement: negative    Other   Erythema: absent  Scars: present (healed arthroscopic portals)  Sensation: normal  Pulse: present            Physical Exam  Constitutional:       Appearance: She is well-developed  HENT:      Head: Normocephalic  Pulmonary:      Breath sounds: Normal breath sounds  No wheezing  Musculoskeletal:      Cervical back: Normal range of motion  Skin:     General: Skin is warm and dry  Neurological:      Mental Status: She is alert and oriented to person, place, and time  Psychiatric:         Behavior: Behavior normal          Thought Content:  Thought content normal          Judgment: Judgment normal

## 2021-10-14 ENCOUNTER — OFFICE VISIT (OUTPATIENT)
Dept: OBGYN CLINIC | Facility: HOSPITAL | Age: 51
End: 2021-10-14
Payer: COMMERCIAL

## 2021-10-14 ENCOUNTER — HOSPITAL ENCOUNTER (OUTPATIENT)
Dept: RADIOLOGY | Facility: HOSPITAL | Age: 51
Discharge: HOME | End: 2021-10-14
Payer: COMMERCIAL

## 2021-10-14 VITALS — SYSTOLIC BLOOD PRESSURE: 117 MMHG | DIASTOLIC BLOOD PRESSURE: 82 MMHG | HEART RATE: 62 BPM
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
## 2021-10-14 DIAGNOSIS — M25.512 LEFT SHOULDER PAIN, UNSPECIFIED CHRONICITY: Primary | ICD-10-CM

## 2021-10-14 DIAGNOSIS — Z98.890 HISTORY OF REPAIR OF ROTATOR CUFF: ICD-10-CM

## 2021-10-14 DIAGNOSIS — M25.512 LEFT SHOULDER PAIN, UNSPECIFIED CHRONICITY: ICD-10-CM

## 2021-10-14 PROCEDURE — 99213 OFFICE O/P EST LOW 20 MIN: CPT | Performed by: PHYSICIAN ASSISTANT

## 2021-10-14 PROCEDURE — 73030 X-RAY EXAM OF SHOULDER: CPT

## 2021-10-20 ENCOUNTER — EVALUATION (OUTPATIENT)
Dept: PHYSICAL THERAPY | Facility: CLINIC | Age: 51
End: 2021-10-20
Payer: COMMERCIAL

## 2021-10-20 DIAGNOSIS — M25.512 LEFT SHOULDER PAIN, UNSPECIFIED CHRONICITY: ICD-10-CM
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
## 2021-10-20 DIAGNOSIS — Z98.890 HISTORY OF REPAIR OF ROTATOR CUFF: ICD-10-CM

## 2021-10-20 PROCEDURE — 97112 NEUROMUSCULAR REEDUCATION: CPT | Performed by: PHYSICAL THERAPIST

## 2021-10-20 PROCEDURE — 97162 PT EVAL MOD COMPLEX 30 MIN: CPT | Performed by: PHYSICAL THERAPIST

## 2021-10-27 ENCOUNTER — APPOINTMENT (OUTPATIENT)
Dept: PHYSICAL THERAPY | Facility: CLINIC | Age: 51
End: 2021-10-27
Payer: COMMERCIAL

## 2021-11-08 ENCOUNTER — OFFICE VISIT (OUTPATIENT)
Dept: OBGYN CLINIC | Facility: OTHER | Age: 51
End: 2021-11-08
Payer: COMMERCIAL

## 2021-11-08 VITALS
SYSTOLIC BLOOD PRESSURE: 145 MMHG | DIASTOLIC BLOOD PRESSURE: 96 MMHG | HEIGHT: 66 IN | HEART RATE: 71 BPM | WEIGHT: 150 LBS | BODY MASS INDEX: 24.11 KG/M2

## 2021-11-08 DIAGNOSIS — S46.012A ROTATOR CUFF STRAIN, LEFT, INITIAL ENCOUNTER: Primary | ICD-10-CM

## 2021-11-08 PROCEDURE — 99213 OFFICE O/P EST LOW 20 MIN: CPT | Performed by: ORTHOPAEDIC SURGERY

## 2021-11-08 RX ORDER — FAMOTIDINE 10 MG
10 TABLET ORAL 2 TIMES DAILY
COMMUNITY

## 2021-11-10 ENCOUNTER — APPOINTMENT (OUTPATIENT)
Dept: PHYSICAL THERAPY | Facility: CLINIC | Age: 51
End: 2021-11-10
Payer: COMMERCIAL

## 2021-11-15 ENCOUNTER — OFFICE VISIT (OUTPATIENT)
Dept: PHYSICAL THERAPY | Facility: CLINIC | Age: 51
End: 2021-11-15
Payer: COMMERCIAL
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
## 2021-11-15 DIAGNOSIS — Z98.890 HISTORY OF REPAIR OF ROTATOR CUFF: ICD-10-CM

## 2021-11-15 DIAGNOSIS — M25.512 LEFT SHOULDER PAIN, UNSPECIFIED CHRONICITY: Primary | ICD-10-CM

## 2021-11-15 PROCEDURE — 97112 NEUROMUSCULAR REEDUCATION: CPT | Performed by: PHYSICAL THERAPIST

## 2021-11-15 PROCEDURE — 97140 MANUAL THERAPY 1/> REGIONS: CPT | Performed by: PHYSICAL THERAPIST

## 2021-11-24 ENCOUNTER — APPOINTMENT (OUTPATIENT)
Dept: PHYSICAL THERAPY | Facility: CLINIC | Age: 51
End: 2021-11-24
Payer: COMMERCIAL

## 2022-01-19 ENCOUNTER — OFFICE VISIT (OUTPATIENT)
Dept: GASTROENTEROLOGY | Facility: CLINIC | Age: 52
End: 2022-01-19
Payer: COMMERCIAL

## 2022-01-19 VITALS
DIASTOLIC BLOOD PRESSURE: 75 MMHG | HEIGHT: 66 IN | WEIGHT: 151.4 LBS | SYSTOLIC BLOOD PRESSURE: 110 MMHG | BODY MASS INDEX: 24.33 KG/M2 | TEMPERATURE: 98.2 F

## 2022-01-19 DIAGNOSIS — K21.9 GASTROESOPHAGEAL REFLUX DISEASE WITHOUT ESOPHAGITIS: Primary | ICD-10-CM

## 2022-01-19 DIAGNOSIS — R10.31 RIGHT LOWER QUADRANT ABDOMINAL PAIN: ICD-10-CM

## 2022-01-19 DIAGNOSIS — K52.9 ILEOCOLITIS: ICD-10-CM

## 2022-01-19 DIAGNOSIS — R19.7 DIARRHEA, UNSPECIFIED TYPE: ICD-10-CM

## 2022-01-19 DIAGNOSIS — R13.19 ESOPHAGEAL DYSPHAGIA: ICD-10-CM

## 2022-01-19 DIAGNOSIS — R93.3 ABNORMAL CT SCAN, COLON: ICD-10-CM

## 2022-01-19 PROCEDURE — 99204 OFFICE O/P NEW MOD 45 MIN: CPT | Performed by: INTERNAL MEDICINE

## 2022-01-19 RX ORDER — ESTRADIOL 0.1 MG/G
CREAM VAGINAL
COMMUNITY
Start: 2021-09-28

## 2022-01-19 RX ORDER — BIMATOPROST 0.01 %
DROPS OPHTHALMIC (EYE)
COMMUNITY
Start: 2021-12-31

## 2022-01-19 NOTE — PATIENT INSTRUCTIONS
Colon scheduled for 3/15 with Dr Tremayne Mallory at [facility] provided verbal instructions/ paper work given  Prep-Mirlax/Mag Citrate

## 2022-01-30 NOTE — PROGRESS NOTES
[facility] - Outpatient Consultation  Leann Negron 45 y o  female MRN: [id number]  Encounter: [id number]          ASSESSMENT AND PLAN:      1  Gastroesophageal reflux disease without esophagitis  2  Esophageal dysphagia  Her reflux and dysphagia are currently well controlled so I encouraged her to continue diet and lifestyle modification and Pepcid as needed  3  Right lower quadrant abdominal pain  4  Diarrhea, unspecified type  5  Abnormal CT scan, colon  6  Ileocolitis  She most likely had a recent infectious ileocolitis although Crohn's disease is also in the differential   Since her last colonoscopy was in 2015, I will schedule her for repeat colonoscopy now  - Colonoscopy; Future    ______________________________________________________________________    HPI:  She has a history of chronic reflux, dysphagia, lower abdominal pain, and diarrhea has been diagnosed with diarrhea predominant irritable bowel syndrome and adhesions in the past   She had a colonoscopy in July 2015 by a colorectal surgeon and had severe abdominal pain after the procedure that improved one day later  She also had upper endoscopy and ERCP in the distant past and believes they were both unremarkable  I last saw her in the office in 2017 and over the past four years she feels her symptoms have been stable  For insurance reasons she was not able to CS at [facility] during this time  Radha Mo Her insurance recently began allowing her to come back to [facility] so she scheduled this appointment  She complains of right lower quadrant pain and diarrhea and her CT scan showed possible ileocolitis  She feels her reflux and dysphagia have been well controlled  REVIEW OF SYSTEMS:    CONSTITUTIONAL: Denies any fever, chills, rigors, and weight loss  HEENT: No earache or tinnitus  Denies hearing loss or visual disturbances  CARDIOVASCULAR: No chest pain or palpitations     RESPIRATORY: Denies any cough, hemoptysis, shortness of breath or dyspnea on exertion  GASTROINTESTINAL: As noted in the History of Present Illness  GENITOURINARY: No problems with urination  Denies any hematuria or dysuria  NEUROLOGIC: No dizziness or vertigo, denies headaches  MUSCULOSKELETAL: Denies any muscle or joint pain  SKIN: Denies skin rashes or itching  ENDOCRINE: Denies excessive thirst  Denies intolerance to heat or cold  PSYCHOSOCIAL: Denies depression or anxiety  Denies any recent memory loss  Historical Information   Past Medical History:   Diagnosis Date    Anxiety     During Menopause    Atrial fibrillation (HCC)     GERD (gastroesophageal reflux disease)     Hyperlipidemia     Irregular heart beat     Pneumonia     age 4    PONV (postoperative nausea and vomiting)     SVT (supraventricular tachycardia) (HCC)      Past Surgical History:   Procedure Laterality Date    AUGMENTATION BREAST      CARDIOVERSION  2020    CHOLECYSTECTOMY      COLONOSCOPY      KNEE ARTHROSCOPY Right     LAPAROSCOPIC ENDOMETRIOSIS FULGURATION      WV ARTHROSCOPY SHOULDER SURGICAL BICEPS TENODESIS Right 1/15/2021    Procedure: SHOULDER ARTHROSCOPY WITH ARTHROSCOPIC BICEPS TENODESIS, ROTATOR CUFF REPAIR; POSTERIOR LABRAL REPAIR, SAD;  Surgeon: Marianne West MD;  Location: [facility];  Service: Orthopedics    SHOULDER ARTHROSCOPY W/ LABRAL REPAIR Bilateral     SHOULDER SURGERY Right     TONSILLECTOMY AND ADENOIDECTOMY      UPPER GASTROINTESTINAL ENDOSCOPY       Social History   Social History     Substance and Sexual Activity   Alcohol Use Not Currently     Social History     Substance and Sexual Activity   Drug Use Never     Social History     Tobacco Use   Smoking Status Never Smoker   Smokeless Tobacco Never Used     History reviewed  No pertinent family history      Meds/Allergies       Current Outpatient Medications:     Ascorbic Acid (VITAMIN C PO)    atenolol (TENORMIN) 25 mg tablet    CALCIUM PO   clonazePAM (KlonoPIN) 0 5 mg tablet    ELDERBERRY PO    estradiol (ESTRACE) 0 1 mg/g vaginal cream    famotidine (PEPCID) 10 mg tablet    ibuprofen (MOTRIN) 800 mg tablet    Lumigan 0 01 % ophthalmic drops    multivitamin (THERAGRAN) TABS    ondansetron (ZOFRAN) 4 mg tablet    VITAMIN D, CHOLECALCIFEROL, PO    metFORMIN (GLUCOPHAGE-XR) 500 mg 24 hr tablet    pantoprazole (PROTONIX) 40 mg tablet    Allergies   Allergen Reactions    Prednisone Other (See Comments)     "AFIB"    Macrobid [Nitrofurantoin] Hives    Other Hives     "Cold Temperatures"    Reglan [Metoclopramide] Syncope    Influenza Vaccines Abdominal Pain    Buspirone Other (See Comments)     Increased her anxiety    Codeine Other (See Comments) and Vomiting     Severe shaking    Compazine [Prochlorperazine] Anxiety and Vomiting    Darvon [Propoxyphene] Other (See Comments) and Vomiting     Severe Shaking    Demerol [Meperidine] Other (See Comments) and Vomiting     Severe Shaking    Morphine Other (See Comments) and Vomiting     Severe Shaking           Objective     Blood pressure 110/75, temperature 98 2 °F (36 8 °C), temperature source Tympanic, height 5' 6" (1 676 m), weight 68 7 kg (151 lb 6 4 oz)  Body mass index is 24 44 kg/m²  PHYSICAL EXAM:      General Appearance:   Alert, cooperative, no distress   HEENT:   Normocephalic, atraumatic, anicteric      Neck:  Supple, symmetrical, trachea midline   Lungs:   Clear to auscultation bilaterally; no rales, rhonchi or wheezing; respirations unlabored    Heart[de-identified]   Regular rate and rhythm; no murmur, rub, or gallop     Abdomen:   Soft, non-tender, non-distended; normal bowel sounds; no masses, no organomegaly    Genitalia:   Deferred    Rectal:   Deferred    Extremities:  No cyanosis, clubbing or edema    Pulses:  2+ and symmetric    Skin:  No jaundice, rashes, or lesions    Lymph nodes:  No palpable cervical lymphadenopathy        Lab Results:   No visits with results within 1 Day(s) from this visit  Latest known visit with results is:   No results found for any previous visit  Radiology Results:   No results found

## 2022-02-28 ENCOUNTER — TELEPHONE (OUTPATIENT)
Dept: GASTROENTEROLOGY | Facility: CLINIC | Age: 52
End: 2022-02-28

## 2022-02-28 NOTE — TELEPHONE ENCOUNTER
Patients GI provider:  Dr Krunal Darling    Number to return call: [phone]    Reason for call: Pt calling to cancel her procedure       Scheduled procedure/appointment date if applicable: procedure 6/99/16

## 2022-04-12 ENCOUNTER — OFFICE VISIT (OUTPATIENT)
Dept: OBGYN CLINIC | Facility: HOSPITAL | Age: 52
End: 2022-04-12
Payer: COMMERCIAL

## 2022-04-12 ENCOUNTER — HOSPITAL ENCOUNTER (OUTPATIENT)
Dept: RADIOLOGY | Facility: HOSPITAL | Age: 52
Discharge: HOME | End: 2022-04-12
Attending: ORTHOPAEDIC SURGERY
Payer: COMMERCIAL

## 2022-04-12 VITALS
WEIGHT: 148.2 LBS | BODY MASS INDEX: 23.82 KG/M2 | HEIGHT: 66 IN | SYSTOLIC BLOOD PRESSURE: 142 MMHG | DIASTOLIC BLOOD PRESSURE: 101 MMHG | HEART RATE: 76 BPM

## 2022-04-12 DIAGNOSIS — M25.512 LEFT SHOULDER PAIN, UNSPECIFIED CHRONICITY: ICD-10-CM

## 2022-04-12 DIAGNOSIS — M24.812 INTERNAL DERANGEMENT OF LEFT SHOULDER: Primary | ICD-10-CM

## 2022-04-12 DIAGNOSIS — M25.512 ACUTE PAIN OF LEFT SHOULDER: ICD-10-CM

## 2022-04-12 PROCEDURE — 99214 OFFICE O/P EST MOD 30 MIN: CPT | Performed by: ORTHOPAEDIC SURGERY

## 2022-04-12 PROCEDURE — 73030 X-RAY EXAM OF SHOULDER: CPT

## 2022-04-12 NOTE — PROGRESS NOTES
Assessment  Diagnoses and all orders for this visit:    Internal derangement of left shoulder    Acute pain of left shoulder        Discussion and Plan:    Patient has an examination worrisome for rotator cuff pathology, given their lack of improvement with physical therapy directed home exercise program she is indicated at this time for an MRI scan to evaluate for surgical pathology  We will review the results of the study when it has been obtained and discuss further treatment options  Subjective:   Patient ID: Keith Sierra is a 45 y o  female      HPI  Patient presents today for follow up of left shoulder pain  She was last seen 1/108/21 for a RTC strain following a fall hiking  Her symptoms were improving with PT and HEP at that time  Patient reports she slipped and fell on hand  while at a candy store in January 2022  She has increased pain with range of motion  She has been doing her HEP which she learned in physical therapy  Left shoulder arthroscopic Bankart Repair with Dr Courtney Mccarty in 2009  The following portions of the patient's history were reviewed and updated as appropriate: allergies, current medications, past family history, past medical history, past social history, past surgical history and problem list     Review of Systems   Constitutional: Negative for chills and fever  HENT: Negative for drooling and sneezing  Eyes: Negative for redness  Respiratory: Negative for cough and wheezing  Gastrointestinal: Negative for nausea and vomiting  Musculoskeletal:        Please see ortho exam   Psychiatric/Behavioral: Negative for behavioral problems  The patient is not nervous/anxious  Objective:  BP (!) 142/101   Pulse 76   Ht 5' 6" (1 676 m)   Wt 67 2 kg (148 lb 3 2 oz)   BMI 23 92 kg/m²       Left Shoulder Exam     Tenderness   The patient is experiencing no tenderness  Range of Motion   The patient has normal left shoulder ROM      Muscle Strength Abduction: 3/5   External rotation: 3/5     Tests   Bell test: positive  Impingement: positive  Drop arm: positive    Other   Erythema: absent  Sensation: normal  Pulse: present     Comments:    Positive empty can            Physical Exam  Vitals reviewed  Constitutional:       Appearance: She is well-developed  Eyes:      Pupils: Pupils are equal, round, and reactive to light  Pulmonary:      Effort: Pulmonary effort is normal       Breath sounds: Normal breath sounds  Skin:     General: Skin is warm and dry  Neurological:      Mental Status: She is alert and oriented to person, place, and time  Psychiatric:         Behavior: Behavior normal          Thought Content: Thought content normal          Judgment: Judgment normal            I have personally reviewed pertinent films in PACS and my interpretation is as follows  Left shoulder x-rays demonstrates no fracture or dislocation, anchor present from prior bankart repair       Scribe Attestation    I,:  Lakia Gonzales am acting as a scribe while in the presence of the attending physician :       I,:  Lucyann Cogan, MD personally performed the services described in this documentation    as scribed in my presence :

## 2022-04-14 ENCOUNTER — HOSPITAL ENCOUNTER (OUTPATIENT)
Dept: RADIOLOGY | Age: 52
Discharge: HOME | End: 2022-04-14
Payer: COMMERCIAL

## 2022-04-14 DIAGNOSIS — M24.812 INTERNAL DERANGEMENT OF LEFT SHOULDER: ICD-10-CM

## 2022-04-14 DIAGNOSIS — M25.512 ACUTE PAIN OF LEFT SHOULDER: ICD-10-CM

## 2022-04-14 PROCEDURE — 73221 MRI JOINT UPR EXTREM W/O DYE: CPT

## 2022-04-14 PROCEDURE — G1004 CDSM NDSC: HCPCS

## 2022-04-18 ENCOUNTER — OFFICE VISIT (OUTPATIENT)
Dept: OBGYN CLINIC | Facility: OTHER | Age: 52
End: 2022-04-18
Payer: COMMERCIAL

## 2022-04-18 VITALS
HEART RATE: 55 BPM | HEIGHT: 66 IN | BODY MASS INDEX: 24.11 KG/M2 | DIASTOLIC BLOOD PRESSURE: 64 MMHG | SYSTOLIC BLOOD PRESSURE: 126 MMHG | WEIGHT: 150 LBS

## 2022-04-18 DIAGNOSIS — M25.512 ACUTE PAIN OF LEFT SHOULDER: ICD-10-CM

## 2022-04-18 DIAGNOSIS — S46.012A ROTATOR CUFF STRAIN, LEFT, INITIAL ENCOUNTER: Primary | ICD-10-CM

## 2022-04-18 PROCEDURE — 99214 OFFICE O/P EST MOD 30 MIN: CPT | Performed by: ORTHOPAEDIC SURGERY

## 2022-04-18 NOTE — PROGRESS NOTES
Assessment  Diagnoses and all orders for this visit:    Rotator cuff strain, left, initial encounter    Acute pain of left shoulder      Discussion and Plan:    Discussed with the patient that the MRI does not show a structural issue that would indicate the need for surgery  Discussed that this is a functional issue that should get better with PT  Patient was provided with a referral to PT  Subjective:   Patient ID: Keith Sierra is a 45 y o  female      HPI  Patient presents today to discuss the findings of her left shoulder  She was last seen 1/18/21 for a RTC strain following a fall hiking  Her symptoms were improving with PT and HEP at that time  Patient reports she slipped and fell on hand  while at a candy store in January 2022  She has increased pain with range of motion  She has been doing her HEP which she learned in physical therapy       Left shoulder arthroscopic [employer]air with Dr Courtney Mccarty in 2009  The following portions of the patient's history were reviewed and updated as appropriate: allergies, current medications, past family history, past medical history, past social history, past surgical history and problem list     Review of Systems   Constitutional: Negative for chills and fever  HENT: Negative for drooling and sneezing  Eyes: Negative for redness  Respiratory: Negative for cough and wheezing  Gastrointestinal: Negative for nausea and vomiting  Musculoskeletal:        Please see ortho exam   Psychiatric/Behavioral: Negative for behavioral problems  The patient is not nervous/anxious  Objective:  /64   Pulse 55   Ht 5' 6" (1 676 m)   Wt 68 kg (150 lb)   BMI 24 21 kg/m²       Left Shoulder Exam     Tenderness   The patient is experiencing no tenderness  Range of Motion   The patient has normal left shoulder ROM      Muscle Strength   Abduction: 3/5   External rotation: 3/5     Tests   Bell test: positive  Impingement: positive    Other   Erythema: absent  Sensation: normal  Pulse: present             Physical Exam  Vitals reviewed  Constitutional:       Appearance: She is well-developed  Eyes:      Pupils: Pupils are equal, round, and reactive to light  Pulmonary:      Effort: Pulmonary effort is normal       Breath sounds: Normal breath sounds  Skin:     General: Skin is warm and dry  Neurological:      Mental Status: She is alert and oriented to person, place, and time  Psychiatric:         Behavior: Behavior normal          Thought Content: Thought content normal          Judgment: Judgment normal            I have personally reviewed pertinent films in PACS and my interpretation is as follows  MRI left shoulder demonstrates no evidence of RTC tear        Scribe Attestation    I,:  Ulices Abad am acting as a scribe while in the presence of the attending physician :       I,:  Jesse Martin MD personally performed the services described in this documentation    as scribed in my presence :

## 2022-04-18 NOTE — PROGRESS NOTES
PT Evaluation     Today's date: 2022  Patient name: Chalino Conn  : 1970  MRN: [id number]  Referring provider: Yudi Graves  Dx:   Encounter Diagnosis     ICD-10-CM    1  Rotator cuff strain, left, initial encounter  S46 012A Ambulatory Referral to Physical Therapy   2  Acute pain of left shoulder  M25 512 Ambulatory Referral to Physical Therapy                  Assessment  Assessment details: Chalino Conn is a 45 y o  female who presents with signs and symptoms consistent of left subacromial pain syndrome  Pt fell on outstretched hand in January which resulted in left RTC strain  MRI was obtain but did not identify a structural issue  Pt's primary movement impairments is decreased motor control of left shoulder/rotator cuff insufficiency  Patient presents with left shoulder pain, decreased ROM, reduce motor control of left shoulder, and shoulder weakness  Pt is also dealing with left sided neck pain and elbow pain at cubital tunnel which may need PT to rectify  Due to these impairments, Patient has difficulty performing over head activities, lifting, reaching, and performing age related activities  Patient would benefit from skilled physical therapy to address the impairments, improve their level of function, and to improve their overall quality of life  Impairments: abnormal muscle firing, abnormal muscle tone, abnormal or restricted ROM, activity intolerance, impaired physical strength, lacks appropriate home exercise program and pain with function  Understanding of Dx/Px/POC: good   Prognosis: good    Goals  Short Term Goals: to be achieved by 4 weeks  1) Patient to be independent with basic HEP  2) Decrease pain to 3/10 at its worst   3) Increase shoulder ROM by 5-10 degrees in all planes  4) Increase shoulder strength by 1/2 MMT grade in all deficient planes  Long Term Goals: to be achieved by discharge  1) FOTO equal to or greater than expected    2) Patient to be independent with comprehensive HEP  3) Patient will demonstrate maximal over head reaching  4) Increase UE strength to 5/5 MMT grade in all planes to improve a/iadls  5) Patient to report no sleep interruption secondary to pain  Plan  Patient would benefit from: PT eval and skilled physical therapy  Planned modality interventions: low level laser therapy  Planned therapy interventions: joint mobilization, manual therapy, neuromuscular re-education, therapeutic activities, patient education and home exercise program  Frequency: 2x per week for 4-6 weeks  Treatment plan discussed with: patient        Subjective Evaluation    History of Present Illness  Mechanism of injury: History of Current Injury: Pt referred to PT from Dr Mike Pearce due to left RTC strain from falling on an out stretched hand in January  Pt had MRI which did not identify a structural issue  She was previously treated by me last year for right rotator cuff repair with successful result  Pain location/Descriptors: P1:left shoulder pain from posterior to anterior which can be sharp and achy  P2: Pt also feels bruising at mid humerus  P3: burning sensation in posterior lateral elbow  All seem to be related  P4: Pt also reports a "sore neck" on the left side  Aggravating factors: back and forth while running on treadmill, donning clothes, reaching, lifting, lying on left side  Easing factors: OTC meds with minimal effectiveness  Pt does use MH at home  24 HR pattern: Movement dependent  Imaging: MRI did not identify structural problem  Special Questions: Pt does admit to numbness in the L hand intermittently  Patient goals:  Conservatively help shoulder so she doesn't have to have surgery  Hobbies/Interest: Treadmill walking/running,   Occupation: Sales- internation  Driving and computer work  Pt unable to use ice due to hives         Pain  Current pain ratin  At best pain ratin  At worst pain rating: 10      Diagnostic Tests  MRI studies: normal  Patient Goals  Patient goals for therapy: decreased edema, increased strength, decreased pain, independence with ADLs/IADLs, increased motion and return to sport/leisure activities          Objective     Palpation   Left   Hypertonic in the upper trapezius  Tenderness of the upper trapezius  Trigger point to upper trapezius  Additional Palpation Details  +tenderness at anterior shoulder at bicipital groove    Cervical/Thoracic Screen   Cervical range of motion within normal limits with the following exceptions: Mild limitation with flexion, extension, rotation, and SB  *Left sided neck pain with end range motion    *No referral beyond left UT region    Neurological Testing     Sensation     Shoulder   Left Shoulder   Intact: light touch    Right Shoulder   Intact: light touch    Reflexes   Left   Biceps (C5/C6): normal (2+)  Brachioradialis (C6): normal (2+)  Triceps (C7): normal (2+)  Rolle's reflex: negative    Right   Biceps (C5/C6): normal (2+)  Brachioradialis (C6): normal (2+)  Triceps (C7): normal (2+)  Rolle's reflex: negative    Active Range of Motion   Left Shoulder   Flexion: 165 degrees   Extension: 75 degrees   Abduction: 180 degrees   External rotation 0°: 100 degrees   Internal rotation BTB: T5     Additional Active Range of Motion Details  *Superior humeral head migration with active flexion and abduction  *+Drop arm with flexion and abduction  *Normal ER/IR ROM at 90 degrees (Passive)     Strength/Myotome Testing     Left Shoulder     Planes of Motion   Flexion: 4- (pain)   Abduction: 4- (Pain)   External rotation at 0°: 4 (pain)   Internal rotation at 0°: 5     Additional Strength Details  Pain with MMT assessment (all planes)              Precautions: A-fib, Labral repair 2009, SVT      Manuals 4/19            Left scap mobs with UT STM 5'                                                   Neuro Re-Ed             Shoulder isometrics             TB rows             TB ext TB no money             Prone T             Prone Y             Ulnar N slider             Ther Ex             UBE             Pendulums             C/S snag                                                                              Ther Activity                                       Gait Training                                       Modalities              PRN                            [facility]  Access CEPA: [email or web address]

## 2022-04-19 ENCOUNTER — EVALUATION (OUTPATIENT)
Dept: PHYSICAL THERAPY | Facility: CLINIC | Age: 52
End: 2022-04-19
Payer: COMMERCIAL

## 2022-04-19 DIAGNOSIS — M25.512 ACUTE PAIN OF LEFT SHOULDER: ICD-10-CM

## 2022-04-19 DIAGNOSIS — S46.012A ROTATOR CUFF STRAIN, LEFT, INITIAL ENCOUNTER: Primary | ICD-10-CM

## 2022-04-19 PROCEDURE — 97140 MANUAL THERAPY 1/> REGIONS: CPT | Performed by: PHYSICAL THERAPIST

## 2022-04-19 PROCEDURE — 97162 PT EVAL MOD COMPLEX 30 MIN: CPT | Performed by: PHYSICAL THERAPIST

## 2022-04-19 PROCEDURE — 97110 THERAPEUTIC EXERCISES: CPT | Performed by: PHYSICAL THERAPIST

## 2022-04-21 ENCOUNTER — APPOINTMENT (OUTPATIENT)
Dept: PHYSICAL THERAPY | Facility: CLINIC | Age: 52
End: 2022-04-21
Payer: COMMERCIAL

## 2022-04-26 ENCOUNTER — OFFICE VISIT (OUTPATIENT)
Dept: PHYSICAL THERAPY | Facility: CLINIC | Age: 52
End: 2022-04-26
Payer: COMMERCIAL

## 2022-04-26 DIAGNOSIS — S46.012A ROTATOR CUFF STRAIN, LEFT, INITIAL ENCOUNTER: ICD-10-CM

## 2022-04-26 DIAGNOSIS — M25.512 ACUTE PAIN OF LEFT SHOULDER: Primary | ICD-10-CM

## 2022-04-26 PROCEDURE — 97110 THERAPEUTIC EXERCISES: CPT | Performed by: PHYSICAL THERAPIST

## 2022-04-26 PROCEDURE — 97112 NEUROMUSCULAR REEDUCATION: CPT | Performed by: PHYSICAL THERAPIST

## 2022-04-26 PROCEDURE — 97140 MANUAL THERAPY 1/> REGIONS: CPT | Performed by: PHYSICAL THERAPIST

## 2022-04-26 NOTE — PROGRESS NOTES
Daily Note     Today's date: 2022  Patient name: Jensen Lama  : 1970  MRN: [id number]  Referring provider: Lawrence Taylor  Dx:   Encounter Diagnosis     ICD-10-CM    1  Acute pain of left shoulder  M25 512    2  Rotator cuff strain, left, initial encounter  S46 012A                   Subjective: Pt reports working on HEP  Overall doing ok  Pain still present in certain planes of motion  Objective: See treatment diary below      Assessment: Tolerated treatment fair  Pt challenged with sidelying ER  Implement rtc and periscapular muscle stabilization/neuro re-ed  Patient exhibited good technique with therapeutic exercises and would benefit from continued PT  Plan: Continue per plan of care  Precautions: A-fib, Labral repair , SVT      Manuals            Left scap mobs with UT STM 5' 8' Gr III                                                  Neuro Re-Ed             Shoulder isometrics  Abd and ER 20x5"            TB rows  30 blk tb           TB ext  20x gtb           TB no money  2x10           Prone T  Bent over 2x10 2#           Prone Y             Ulnar N slider  10x ea way           Ther Ex             UBE  5'            Pendulums             C/S snag  10x ea for 5"           SL shoulder ER   20x                                                               Ther Activity                                       Gait Training                                       Modalities             MH PRN                          1:1 with PT from [phone]    [employer]  Access Code: [email or web address]

## 2022-04-28 ENCOUNTER — OFFICE VISIT (OUTPATIENT)
Dept: PHYSICAL THERAPY | Facility: CLINIC | Age: 52
End: 2022-04-28
Payer: COMMERCIAL

## 2022-04-28 DIAGNOSIS — S46.012A ROTATOR CUFF STRAIN, LEFT, INITIAL ENCOUNTER: ICD-10-CM

## 2022-04-28 DIAGNOSIS — M25.512 ACUTE PAIN OF LEFT SHOULDER: Primary | ICD-10-CM

## 2022-04-28 PROCEDURE — 97112 NEUROMUSCULAR REEDUCATION: CPT

## 2022-04-28 PROCEDURE — 97110 THERAPEUTIC EXERCISES: CPT

## 2022-04-28 PROCEDURE — 97140 MANUAL THERAPY 1/> REGIONS: CPT

## 2022-04-28 NOTE — PROGRESS NOTES
Daily Note     Today's date: 2022  Patient name: Yg Ram  : 1970  MRN: [id number]  Referring provider: Martha Browning  Dx:   Encounter Diagnosis     ICD-10-CM    1  Acute pain of left shoulder  M25 512    2  Rotator cuff strain, left, initial encounter  S46 012A                   Subjective: Pt states she experienced brief "stabbing pain" in shdr after last visit  Objective: See treatment diary below      Assessment: Tolerated treatment well  Patient would benefit from continued PT  Few minor cues needed for proper technique, michel w/ cervical rotational snags  No increased discomfort noted w/ TB ex  Pt c/o increased discomfort w/ snags to the R, noting it gives her a "headache" due to the tightness  R UT spasm persists  Added 2lbs to SL ER w/ min difficulty  Plan: Progress treatment as tolerated         Precautions: A-fib, Labral repair , SVT      Manuals           Left scap mobs with UT STM 5' 8' Gr III JK- mobs; STM- LM                                                 Neuro Re-Ed             Shoulder isometrics  Abd and ER 20x5"  abd and ER 20x5"          TB rows  30 blk tb blk TB x20          TB ext  20x gtb GTB x20          TB no money  2x10 GTB 2x10          Prone T  Bent over 2x10 2# 2# bent over 2x10          Prone Y   2# 2x10 ea           Ulnar N slider  10x ea way           Ther Ex             UBE  5'  5'          Pendulums             C/S snag  10x ea for 5" 10x5" ea rotational          SL shoulder ER   20x 2# x10                                                               Ther Activity                                       Gait Training                                       Modalities             MH PRN

## 2022-05-03 ENCOUNTER — APPOINTMENT (OUTPATIENT)
Dept: PHYSICAL THERAPY | Facility: CLINIC | Age: 52
End: 2022-05-03
Payer: COMMERCIAL

## 2022-05-05 ENCOUNTER — OFFICE VISIT (OUTPATIENT)
Dept: PHYSICAL THERAPY | Facility: CLINIC | Age: 52
End: 2022-05-05
Payer: COMMERCIAL

## 2022-05-05 DIAGNOSIS — S46.012A ROTATOR CUFF STRAIN, LEFT, INITIAL ENCOUNTER: ICD-10-CM

## 2022-05-05 DIAGNOSIS — M25.512 ACUTE PAIN OF LEFT SHOULDER: Primary | ICD-10-CM

## 2022-05-05 PROCEDURE — 97112 NEUROMUSCULAR REEDUCATION: CPT | Performed by: PHYSICAL THERAPIST

## 2022-05-05 PROCEDURE — 97110 THERAPEUTIC EXERCISES: CPT | Performed by: PHYSICAL THERAPIST

## 2022-05-05 PROCEDURE — 97140 MANUAL THERAPY 1/> REGIONS: CPT | Performed by: PHYSICAL THERAPIST

## 2022-05-05 NOTE — PROGRESS NOTES
Daily Note     Today's date: 2022  Patient name: Luis Enrique Bear  : 1970  MRN: [id number]  Referring provider: Donnie Childers  Dx:   Encounter Diagnosis     ICD-10-CM    1  Acute pain of left shoulder  M25 512    2  Rotator cuff strain, left, initial encounter  S46 012A        Start Time: 1725  Stop Time: 1810  Total time in clinic (min): 45 minutes    Subjective: Pt reports her shoulder is really sore today  Objective: See treatment diary below      Assessment: Pt continues to have a palpable trigger point in left upper trapezius  She tolerated strengthening and motor control exercises well  Implemented sidelying shoulder abduction  The initial few repetitions were uncomfortable, but this improved with subsequent repetitions  Continued with ulnar nerve glides and cervical ROM exercises  Pt appropriately fatigued with treatment  Tolerated treatment well  Patient would benefit from continued PT      Plan: Continue per plan of care        Precautions: A-fib, Labral repair , SVT      Manuals          Left scap mobs with UT STM 5' 8' Gr III JK- mobs; STM- LM Gr III 8' c/ STM                                                Neuro Re-Ed             Shoulder isometrics  Abd and ER 20x5"  abd and ER 20x5"          TB rows  30 blk tb blk TB x20 blk TB x20         TB ext  20x gtb GTB x20 GTB x20         TB no money  2x10 GTB 2x10 GTB 2x10         Prone T  Bent over 2x10 2# 2# bent over  2# bent over          Prone Y    2x10 ea  2# bent over         Ulnar N slider  10x ea way  10x ea way         Ther Ex             UBE  5'  5' 5'         Pendulums             C/S snag  10x ea for 5" 10x5" ea rotational 10x5" ea rotational         SL shoulder ER   20x 2# x10  2# 2x10                                                             Ther Activity                                       Gait Training                                       Modalities             MH PRN 1:1 with PT from [phone]

## 2022-05-09 ENCOUNTER — OFFICE VISIT (OUTPATIENT)
Dept: PHYSICAL THERAPY | Facility: CLINIC | Age: 52
End: 2022-05-09
Payer: COMMERCIAL

## 2022-05-09 DIAGNOSIS — S46.012A ROTATOR CUFF STRAIN, LEFT, INITIAL ENCOUNTER: Primary | ICD-10-CM

## 2022-05-09 DIAGNOSIS — M25.512 ACUTE PAIN OF LEFT SHOULDER: ICD-10-CM

## 2022-05-09 PROCEDURE — 97140 MANUAL THERAPY 1/> REGIONS: CPT | Performed by: PHYSICAL THERAPIST

## 2022-05-09 PROCEDURE — 97110 THERAPEUTIC EXERCISES: CPT | Performed by: PHYSICAL THERAPIST

## 2022-05-09 PROCEDURE — G0283 ELEC STIM OTHER THAN WOUND: HCPCS | Performed by: PHYSICAL THERAPIST

## 2022-05-09 PROCEDURE — 97112 NEUROMUSCULAR REEDUCATION: CPT | Performed by: PHYSICAL THERAPIST

## 2022-05-09 PROCEDURE — 97014 ELECTRIC STIMULATION THERAPY: CPT | Performed by: PHYSICAL THERAPIST

## 2022-05-09 NOTE — PROGRESS NOTES
Daily Note     Today's date: 2022  Patient name: Royal Estrada  : 1970  MRN: [id number]  Referring provider: Blaise Rodriguez*  Dx:   Encounter Diagnosis     ICD-10-CM    1  Rotator cuff strain, left, initial encounter  S46 012A    2  Acute pain of left shoulder  M25 512        Start Time: 1700  Stop Time: 1745  Total time in clinic (min): 45 minutes    Subjective: Pt reports more shoulder soreness from past PT session; however, we did not progress the plan of care  She states that it could have been the weather  Pt has not taken any medication  Objective: See treatment diary below      Assessment: Pt unable to tolerate much AROM today  She denies any trauma or inciting to cause her symptoms and we did not progress exercises last session  Returned to more simple exercises such as pendulums  Encouraged patient to return to easy ROM exercises, use MH/TENS, and we will re-assess next visit  Discuss with patient that all pain is not directly correlated with the health and state of tissues  Patient would benefit from continued PT  Plan: Continue per plan of care        Precautions: A-fib, Labral repair , SVT      Manuals         Left scap mobs with UT STM 5' 8' Gr III JK- mobs; STM- LM Gr III 8' c/ STM Gr III 8' c/ STM        PROM of the left shoulder     5'                                  Neuro Re-Ed             Shoulder isometrics  Abd and ER 20x5"  abd and ER 20x5"          TB rows  30 blk tb blk TB x20 blk TB x20         TB ext  20x gtb GTB x20 GTB x20         TB no money  2x10 GTB 2x10 GTB 2x10         Prone T  Bent over 2x10 2# 2# bent over  2# bent over          Prone Y    2x10 ea  2# bent over         Ulnar N slider  10x ea way  10x ea way         Bent over rows     AROM 30x        Ther Ex             UBE  5'  5' 5' 6'        Pendulums             C/S snag  10x ea for 5" 10x5" ea rotational 10x5" ea rotational         SL shoulder ER   20x 2# x10  2# 2x10 Pendulums                                                    Ther Activity                                       Gait Training                                       Modalities             MH PRN     MH/TENS                       1:1 with PT from 0-064p

## 2022-05-12 ENCOUNTER — OFFICE VISIT (OUTPATIENT)
Dept: PHYSICAL THERAPY | Facility: CLINIC | Age: 52
End: 2022-05-12
Payer: COMMERCIAL

## 2022-05-12 DIAGNOSIS — M25.512 ACUTE PAIN OF LEFT SHOULDER: ICD-10-CM

## 2022-05-12 DIAGNOSIS — S46.012A ROTATOR CUFF STRAIN, LEFT, INITIAL ENCOUNTER: Primary | ICD-10-CM

## 2022-05-12 PROCEDURE — 97014 ELECTRIC STIMULATION THERAPY: CPT | Performed by: PHYSICAL THERAPIST

## 2022-05-12 PROCEDURE — 97140 MANUAL THERAPY 1/> REGIONS: CPT | Performed by: PHYSICAL THERAPIST

## 2022-05-12 PROCEDURE — 97110 THERAPEUTIC EXERCISES: CPT | Performed by: PHYSICAL THERAPIST

## 2022-05-12 PROCEDURE — 97112 NEUROMUSCULAR REEDUCATION: CPT | Performed by: PHYSICAL THERAPIST

## 2022-05-12 PROCEDURE — G0283 ELEC STIM OTHER THAN WOUND: HCPCS | Performed by: PHYSICAL THERAPIST

## 2022-05-12 NOTE — PROGRESS NOTES
Daily Note     Today's date: 2022  Patient name: Antonio Storm  : 1970  MRN: [id number]  Referring provider: Tom Daily  Dx:   Encounter Diagnosis     ICD-10-CM    1  Rotator cuff strain, left, initial encounter  S46 012A    2  Acute pain of left shoulder  M25 512        Start Time: 1705  Stop Time: 1745  Total time in clinic (min): 40 minutes    Subjective: Pt reports mild improvement from last session  She still has more symptoms in the shoulder than last week  Objective: See treatment diary below      Assessment: Resumed exercises today without much resistance  Pt still was guarding and bracing arm during certain shoulder movements  However, patient demonstrated improved tolerance to treatment today  Continued MH/TENS for pain modulation which helped  Patient would benefit from continued PT      Plan: Continue per plan of care  Gradually increase resistance to the rotator cuff next session        Precautions: A-fib, Labral repair , SVT      Manuals        Left scap mobs with UT STM 5' 8' Gr III JK- mobs; STM- LM Gr III 8' c/ STM Gr III 8' c/ STM        PROM of the left shoulder     5' 5'       STM to UT and left periscapular region      5'                    Neuro Re-Ed             Shoulder isometrics  Abd and ER 20x5"  abd and ER 20x5"          TB rows  30 blk tb blk TB x20 blk TB x20  rtb 20x       TB ext  20x gtb GTB x20 GTB x20  rtb 20x       TB no money  2x10 GTB 2x10 GTB 2x10         Prone T  Bent over 2x10 2# 2# bent over  2# bent over          Prone Y    2x10 ea  2# bent over         Ulnar N slider  10x ea way  10x ea way         Bent over rows     AROM 30x 30x 2#       Ther Ex             UBE  5'  5' 5' 6' 6'       Pendulums             C/S snag  10x ea for 5" 10x5" ea rotational 10x5" ea rotational         SL shoulder ER   20x 2# x10  2# 2x10  2x10       Pendulums             AAROM c/ PB      flexion and abd 20x ea on table Ther Activity                                       Gait Training                                       Modalities             MH PRN     MH/TENS MH/TENS x 10'                       1:1 with PT from [employer] [facility]  Access Code: [email or web address]

## 2022-05-16 ENCOUNTER — APPOINTMENT (OUTPATIENT)
Dept: PHYSICAL THERAPY | Facility: CLINIC | Age: 52
End: 2022-05-16
Payer: COMMERCIAL

## 2022-05-19 ENCOUNTER — OFFICE VISIT (OUTPATIENT)
Dept: PHYSICAL THERAPY | Facility: CLINIC | Age: 52
End: 2022-05-19
Payer: COMMERCIAL

## 2022-05-19 DIAGNOSIS — M25.512 ACUTE PAIN OF LEFT SHOULDER: Primary | ICD-10-CM

## 2022-05-19 DIAGNOSIS — S46.012A ROTATOR CUFF STRAIN, LEFT, INITIAL ENCOUNTER: ICD-10-CM

## 2022-05-19 PROCEDURE — 97112 NEUROMUSCULAR REEDUCATION: CPT | Performed by: PHYSICAL THERAPIST

## 2022-05-19 PROCEDURE — 97140 MANUAL THERAPY 1/> REGIONS: CPT | Performed by: PHYSICAL THERAPIST

## 2022-05-19 PROCEDURE — 97110 THERAPEUTIC EXERCISES: CPT | Performed by: PHYSICAL THERAPIST

## 2022-05-19 NOTE — PROGRESS NOTES
Daily Note     Today's date: 2022  Patient name: Cody Frey  : 1970  MRN: [id number]  Referring provider: Tyree Kapadia:   Encounter Diagnosis     ICD-10-CM    1  Acute pain of left shoulder  M25 512    2  Rotator cuff strain, left, initial encounter  C60 861O        Start Time:   Stop Time: 1755  Total time in clinic (min): 43 minutes    Subjective: Pt continues to report shoulder pain that "catches" and feels as though it dislocates  Pt reports most pain with horizontal adduction of the shoulder  Objective: See treatment diary below      Assessment: Pt arrived late to appointment and was accommodated  Pt is fearful of moving shoulder in certain planes secondary to pain  She has several episodes of transient sharp pain during mid range motion  Pain typically subsides when patient rests shoulder in a neutral position  Focused treatment on AROM and AAROM  Encouraged patient to continue prescribed HEP  Patient would benefit from continued PT      Plan: Continue per plan of care        Precautions: A-fib, Labral repair , SVT      Manuals       Left scap mobs with UT STM 5' 8' Gr III JK- mobs; STM- LM Gr III 8' c/ STM Gr III 8' c/ STM        PROM of the left shoulder     5' 5' 8'      STM to UT and left periscapular region      5'                    Neuro Re-Ed             Shoulder isometrics  Abd and ER 20x5"  abd and ER 20x5"          TB rows  30 blk tb blk TB x20 blk TB x20  rtb 20x rtb 20x      TB ext  20x gtb GTB x20 GTB x20  rtb 20x rtb 20x      TB no money  2x10 GTB 2x10 GTB 2x10         Prone T  Bent over 2x10 2# 2# bent over  2# bent over          Prone Y    2x10 ea  2# bent over         Ulnar N slider  10x ea way  10x ea way         Bent over rows     AROM 30x 30x 2#       Ther Ex             UBE  5'  5' 5' 6' 6' 6'      Pendulums             C/S snag  10x ea for 5" 10x5" ea rotational 10x5" ea rotational         SL shoulder ER   20x 2# x10  2# 2x10  2x10 3x10      Pendulums             AAROM c/ PB      flexion and abd 20x ea on table  flexion and abd 20x ea on table                                 Ther Activity                                       Gait Training                                       Modalities             MH PRN     MH/TENS MH/TENS x 10'  [address] 10'                     1:1 with PT from [phone]

## 2022-05-23 ENCOUNTER — APPOINTMENT (OUTPATIENT)
Dept: PHYSICAL THERAPY | Facility: CLINIC | Age: 52
End: 2022-05-23
Payer: COMMERCIAL

## 2022-05-23 ENCOUNTER — TELEPHONE (OUTPATIENT)
Dept: OBGYN CLINIC | Facility: HOSPITAL | Age: 52
End: 2022-05-23

## 2022-05-23 NOTE — TELEPHONE ENCOUNTER
Patient sees Dr Allyssa Lawson      Patient is calling because physical therapy is causing her a lot pain  It hurts worse after PT and after she does her home exercises   She would like to know if she should continue with PT or hold off until her next follow up appointment          CB: [phone]

## 2022-05-23 NOTE — TELEPHONE ENCOUNTER
Spoke to patient  She denies redness, heat to touch, or swelling of the shoulder  Stated it feels like it could dislocate with certain movements and it is painful  She stated she will take a break form PT and continue stretching at home several times a day      She will let us know if she wants to be seen sooner than scheduled after a break from PT

## 2022-05-23 NOTE — TELEPHONE ENCOUNTER
She can take a break from formal PT  Focus mostly on the stretching at home  Stretching should occur several times a day

## 2022-05-26 ENCOUNTER — APPOINTMENT (OUTPATIENT)
Dept: PHYSICAL THERAPY | Facility: CLINIC | Age: 52
End: 2022-05-26
Payer: COMMERCIAL

## 2022-05-31 ENCOUNTER — EVALUATION (OUTPATIENT)
Dept: PHYSICAL THERAPY | Facility: CLINIC | Age: 52
End: 2022-05-31
Payer: COMMERCIAL

## 2022-05-31 DIAGNOSIS — S46.012A ROTATOR CUFF STRAIN, LEFT, INITIAL ENCOUNTER: ICD-10-CM

## 2022-05-31 DIAGNOSIS — M25.512 ACUTE PAIN OF LEFT SHOULDER: Primary | ICD-10-CM

## 2022-05-31 PROCEDURE — 97140 MANUAL THERAPY 1/> REGIONS: CPT | Performed by: PHYSICAL THERAPIST

## 2022-05-31 PROCEDURE — 97110 THERAPEUTIC EXERCISES: CPT | Performed by: PHYSICAL THERAPIST

## 2022-05-31 PROCEDURE — 97112 NEUROMUSCULAR REEDUCATION: CPT | Performed by: PHYSICAL THERAPIST

## 2022-05-31 NOTE — PROGRESS NOTES
Daily Note     Today's date: 2022  Patient name: Guillermo Cade  : 1970  MRN: [id number]  Referring provider: Claritza Medina  Dx:   Encounter Diagnosis     ICD-10-CM    1  Acute pain of left shoulder  M25 [id number]    2  Rotator cuff strain, left, initial encounter  S46 [id number]        Start Time: 1700  Stop Time: 1740  Total time in clinic (min): 40 minutes  Assessment  Assessment details: Guillermo Cade is a 45 y o  female who presents with signs and symptoms consistent of left subacromial pain syndrome  She has attended PT for 6 weeks  Pt was progressing well throughout program; however, her symptoms did worsen mid-way through plan of care  Patient missed last weeks treatments secondary to dermatological issues and recommendations of the surgeons office  Upon re-evaluation today, pt still has aberrant movement of her left shoulder  She demonstrates worsening of active ROM compared to IE and demonstrates a degree of fear of movement in certain planes  She feels mechanical symptoms such as a "catching" and "popping" with shoulder AROM  Her symptoms severity and irritability are high at left shoulder  Pt will be put on hold at this time until follow up with Dr Vaishali Stephens  Pt is very hypervigilant regarding her symptoms  She is welcome back for treatment if tolerated  Impairments: abnormal muscle firing, abnormal muscle tone, abnormal or restricted ROM, activity intolerance, impaired physical strength, lacks appropriate home exercise program and pain with function  Understanding of Dx/Px/POC: good   Prognosis: good    Goals  Short Term Goals: to be achieved by 4 weeks  1) Patient to be independent with basic HEP  -Not met  2) Decrease pain to 3/10 at its worst -Not met  3) Increase shoulder ROM by 5-10 degrees in all planes-Not met  4) Increase shoulder strength by 1/2 MMT grade in all deficient planes -Not met    Long Term Goals: to be achieved by discharge  1) FOTO equal to or greater than expected  -Not met  2) Patient to be independent with comprehensive HEP  -Not met  3) Patient will demonstrate maximal over head reaching-Not met  4) Increase UE strength to 5/5 MMT grade in all planes to improve a/iadls -Not met  5) Patient to report no sleep interruption secondary to pain -Not met      Plan  Patient would benefit from: PT eval and skilled physical therapy  Planned modality interventions: low level laser therapy  Planned therapy interventions: joint mobilization, manual therapy, neuromuscular re-education, therapeutic activities, patient education and home exercise program  Frequency: 2x per week for 4-6 weeks  Treatment plan discussed with: patient        Subjective Evaluation    History of Present Illness  Mechanism of injury: History of Current Injury: Pt referred to PT from Dr Merle Landaverde due to left RTC strain from falling on an out stretched hand in January  Pt had MRI which did not identify a structural issue  She was previously treated by me last year for right rotator cuff repair with successful result  Pain location/Descriptors: P1:left shoulder pain from posterior to anterior which can be sharp and achy  P2: Pt also feels bruising at mid humerus  P3: burning sensation in posterior lateral elbow  All seem to be related  P4: Pt also reports a "sore neck" on the left side  Aggravating factors: back and forth while running on treadmill, donning clothes, reaching, lifting, lying on left side  Easing factors: OTC meds with minimal effectiveness  Pt does use MH at home  24 HR pattern: Movement dependent  Imaging: MRI did not identify structural problem  Special Questions: Pt does admit to numbness in the L hand intermittently  Patient goals:  Conservatively help shoulder so she doesn't have to have surgery  Hobbies/Interest: Treadmill walking/running,   Occupation: Sales- internation  Driving and computer work  Pt unable to use ice due to hives         Pain  Current pain rating: 0  At best pain ratin  At worst pain rating: 10      Diagnostic Tests  MRI studies: normal  Patient Goals  Patient goals for therapy: decreased edema, increased strength, decreased pain, independence with ADLs/IADLs, increased motion and return to sport/leisure activities          Objective     Palpation   Left   Hypertonic in the upper trapezius  Tenderness of the upper trapezius  Trigger point to upper trapezius  Additional Palpation Details  +tenderness at anterior shoulder at bicipital groove    Cervical/Thoracic Screen   Cervical range of motion within normal limits with the following exceptions: Mild limitation with flexion, extension, rotation, and SB  *Left sided neck pain with end range motion    *No referral beyond left UT region    Neurological Testing     Sensation     Shoulder   Left Shoulder   Intact: light touch    Right Shoulder   Intact: light touch    Reflexes   Left   Biceps (C5/C6): normal (2+)  Brachioradialis (C6): normal (2+)  Triceps (C7): normal (2+)  Rolle's reflex: negative    Right   Biceps (C5/C6): normal (2+)  Brachioradialis (C6): normal (2+)  Triceps (C7): normal (2+)  Rolle's reflex: negative    Active Range of Motion   Left Shoulder   Flexion: 165 degrees   Extension: 40 degrees   Abduction: 150 degrees   External rotation 0°: 85 degrees   Internal rotation BTB: T5     Additional Active Range of Motion Details  *Superior humeral head migration with active flexion and abduction  *+Drop arm with flexion and abduction (This continues)   *Normal ER/IR ROM at 90 degrees (Passive)     Strength/Myotome Testing     Left Shoulder     Planes of Motion   Flexion: 4- (pain)   Abduction: 4- (Pain)   External rotation at 0°: 4 (pain)   Internal rotation at 0°: 5     Additional Strength Details  Pain with MMT assessment (all planes)          Precautions: A-fib, Labral repair , SVT      Manuals      Left scap mobs with UT STM 5' 8' Gr III JK- mobs; STM- LM Gr III 8' c/ STM Gr III 8' c/ STM        PROM of the left shoulder     5' 5' 8'      STM to UT and left periscapular region      5'                    Neuro Re-Ed             Shoulder isometrics  Abd and ER 20x5"  abd and ER 20x5"          TB rows  30 blk tb blk TB x20 blk TB x20  rtb 20x rtb 20x      TB ext  20x gtb GTB x20 GTB x20  rtb 20x rtb 20x      TB no money  2x10 GTB 2x10 GTB 2x10         Prone T  Bent over 2x10 2# 2# bent over  2# bent over          Prone Y    2x10 ea  2# bent over         Ulnar N slider  10x ea way  10x ea way         Bent over rows     AROM 30x 30x 2#       Ther Ex             UBE  5'  5' 5' 6' 6' 6' 5'     Pendulums             C/S snag  10x ea for 5" 10x5" ea rotational 10x5" ea rotational         SL shoulder ER   20x 2# x10  2# 2x10  2x10 3x10      Pendulums             AAROM c/ PB      flexion and abd 20x ea on table  flexion and abd 20x ea on table                                 Ther Activity                                       Gait Training                                       Modalities             MH PRN     MH/TENS MH/TENS x 10'  MH 10' MH/TENS x 10'                    Pt was re-evaluated today x 20 minutes

## 2022-06-20 ENCOUNTER — OFFICE VISIT (OUTPATIENT)
Dept: OBGYN CLINIC | Facility: OTHER | Age: 52
End: 2022-06-20
Payer: COMMERCIAL

## 2022-06-20 VITALS
WEIGHT: 149.91 LBS | SYSTOLIC BLOOD PRESSURE: 112 MMHG | HEART RATE: 62 BPM | HEIGHT: 66 IN | DIASTOLIC BLOOD PRESSURE: 81 MMHG | BODY MASS INDEX: 24.09 KG/M2

## 2022-06-20 DIAGNOSIS — M24.812 INTERNAL DERANGEMENT OF LEFT SHOULDER: ICD-10-CM

## 2022-06-20 DIAGNOSIS — S46.012D ROTATOR CUFF STRAIN, LEFT, SUBSEQUENT ENCOUNTER: Primary | ICD-10-CM

## 2022-06-20 PROCEDURE — 99214 OFFICE O/P EST MOD 30 MIN: CPT | Performed by: PHYSICIAN ASSISTANT

## 2022-06-20 NOTE — PATIENT INSTRUCTIONS
You are being scheduled for a shoulder arthroscopy to treat your symptoms  Below are some instructions and information on what to expect before and after your surgery  Pre-Surgical Preparation for Arthroscopic Shoulder Surgery: You will be contacted the evening prior to your surgery to confirm the scheduled time of the procedure and when to arrive at the hospital    Do not eat or drink anything after midnight the night before your surgery  Since you are having out-patient surgery, make sure that you have someone who can drive you home later in the day  Also, prepare that person for a long day, as the process of safely preparing for and recovering from the procedure is more time consuming than the actual procedure! As you will be in a sling after surgery, please wear or bring a loose fitting button-down shirt so that you can easily place this over the sling when you leave the surgical suite  This avoids having to place the operative arm in a sleeve  Most patients find that this is the easiest outfit to wear for the first week or so after surgery so you may want to plan accordingly  Most patients find that lying down in bed after shoulder surgery accentuates their discomfort  This is likely related to the effect of gravity on the swelling in the shoulder  As a result, most patients sleep better in a recliner or in bed with pillows propped up behind their back for the first few days or weeks after surgery  It is a good idea to plan for this ahead of time so there will be less hassle getting things set up the night after surgery  What to Expect After Arthroscopic Shoulder Surgery: It is normal to have swelling and discomfort in the shoulder for several days or a week after surgery  It is also normal to have a small amount of drainage from the surgical wounds (especially the first few days after surgery), as we put fluid into the shoulder to visualize the structures during surgery  It is NOT normal to have foul smelling, purulent drainage and if this is noted, please contact the office immediately or proceed to the emergency room for evaluation as this may indicate an infection  Applying ice bags to the shoulder may help with pain that is not controlled by the regional block  Ice should be applied 20-30 minutes at a time, every hour or two  Make sure to put a thin towel or T-shirt next to your skin to avoid direct contact of the ice with the skin  Icing is most helpful in the first 48 hours, although many people find that continuing past this time frame lessens their postoperative pain  Please note that your post-operative dressing is not conductive to ice, so if you need to, it is okay to remove that dressing even the night after surgery and place band-aids over the wounds in order for the ice to take effect  Pain Control    Most patients will receive a nerve block, the local anesthetic may keep your whole arm numb for up to 4 days  You will be given a prescription for narcotic pain medication when you are discharged from the hospital   With the newer nerve block that is being utilized, patients are rarely requiring the use of this narcotic pain medication  If you find you do not tolerate that type of pain medicine well, call our office and we will try another one  In addition to the narcotic pain medication, it is safe to use an anti-inflammatory (unless the patient has a medical condition that would not allow safe use of this mediation)  This includes the Advil, Motrin, Ibuprofen and Alleve category of medications  Simply follow the over the counter dosing on the package and use as indicated as another adjunct  Importantly since these medications are all very similar, use only one of them  Tylenol is a separate medication that can be utilized as well and can be taken at the same time as the other medication or given in a "staggered" manner    Just make sure that you follow the dosing on the over the counter bottle instructions  Also make sure that the pain medication prescribed by Dr Mauricio Manifold team does not contain acetaminophen (this is found in Percocet and Vicodin)  Typically we do not prescribe those types of pain medications but if for some reason that has been prescribed DO NOT add more Tylenol (acetaminophen) as you could end up taking too much of that medication  As mentioned above, most patients find that lying down accentuates their discomfort  You might sleep better in a recliner, or propped up in bed  Dr Roxana Oglesby encourages patients to safely ambulate around the house as much as possible in the first few days after the procedure as this can help with blood circulation in the legs  While the incidence of blood clots is very rare following shoulder surgery, early ambulation is a great way to help decrease the already low rate  24 hours after the surgery you may remove the bandage and cover incisions with Band-Aids if needed  At that time you may shower, the wounds will have a surgical glue that will protect them from shower water but do not submerge your incisions directly (bathing or swimming) until at least 2 weeks post-operatively  It is safe to let the arm hang at the side and take a shower and put on a shirt without the sling on  Just make sure that you do not use the operative are to reach out and grab anything as that may damage the repair  When you are done showering and getting dressed please return the operative arm to the sling  Unless noted otherwise in your discharge paperwork, Dr Roxana Oglesby uses absorbable sutures so they do not need to be removed  Dr Valentino Shade physician assistant (PA) will see you in the office a few days after the procedure to review the intra-operative findings and to initiate physical therapy if appropriate    A post-operative appointment should have been scheduled for you already, but if for some reason this did not happen, please call the office to make one  Physical therapy is important after nearly all shoulder surgeries and a detailed rehabilitation plan based on the specific intra-operative findings and procedures will be provided to your therapist at the first post-operative office visit  Most patients have post-operative therapy appointments scheduled pre-operatively, but if you do not, that will be handled at the first post-operative office visit  Unless expressly directed otherwise it is safe to remove the sling even the first day after the surgery and let the arm hang by the side  This allows patients to shower and even put a shirt on (bad arm in the sleeve first)  It is also safe to flex and extend their wrist, hand and fingers as much as possible when the block wears off  These simple motions can serve to pump fluid out of the forearm and decrease swelling in the arm

## 2022-06-20 NOTE — PROGRESS NOTES
Lenora Barth MD personally examined the patient and reviewed the history provided  I agree with the note and the assessment and plan by Peter Rojas PA-C  Assessment:    Left shoulder internal derangement    Plan:    Patient has failed to improve with appropriate nonoperative care of her left shoulder and were unable to identify the causative pathology on her MRI scan possibly related to metallic anchors in place and the scatter that those anchors are causing  Given her persistent symptoms despite nonoperative care she is indicated at this time for arthroscopic evaluation and treatment based on intraoperative findings  She does wish to proceed forward with diagnostic arthroscopy of left shoulder and will be scheduled accordingly  Informed consent was obtained after a thorough discussion risks and benefits procedure as well as alternatives to the procedure                Assessment  Diagnoses and all orders for this visit:    Rotator cuff strain, left, subsequent encounter    Internal derangement of left shoulder      Discussion and Plan:    Robert Kovacs continues to have pain and mechanical symptoms with her left shoulder despite PT, activity modification and time  MRI does not show structural pathology but her exam continues to be worrisome for structural pathology  Diagnostic arthroscopy is the next step in her care  1  Continue with HEP - gentle stretching - avoid exercises that cause pain  2  Activities to tolerance  3  Plan for OR for diagnostic arthroscopic left shoulder  She is aware we will evaluate the metallic anchors in the shoulder as well as her biceps and rotator cuff  Any repairs that are needed will be performed at the time of surgery  Given this is diagnostic we cannot [place] her on expected recovery  4  Follow up after surgery  5  Therapy was ordered for post-op  She has sling at home    If she cannot locate it, we will use regular sling from OR at time of surgery  Subjective:   Patient ID: Hugo Woodruff is a 47 y o  female      Adán Andersen returns in follow up of the left shoulder  She had a fall back in January  She followed up with Dr Stephen Ferrer following a MRI which did not show any structural pathology  After a 6 week course of formal therapy, Adán Andersen reports worsening of left shoulder pain  Per therapist, there is objective decline in IE parameters  Adán Andersen reports pain with ROM, mostly with abduction  Admits to catching of the left shoulder with certain motions  Pain improves with rest   Admits to pain that interferes with sleep  Denies paresthesias  Denies recent injury or trauma  The following portions of the patient's history were reviewed and updated as appropriate: allergies, current medications, past family history, past medical history, past social history, past surgical history and problem list     Review of Systems   Constitutional: Negative for chills and fever  HENT: Negative for hearing loss  Eyes: Negative for visual disturbance  Respiratory: Negative for shortness of breath  Cardiovascular: Negative for chest pain  Gastrointestinal: Negative for abdominal pain  Musculoskeletal:        As reviewed in the HPI   Skin: Negative for rash  Neurological:        As reviewed in the HPI   Psychiatric/Behavioral: Negative for agitation  Objective:  /81   Pulse 62   Ht 5' 6" (1 676 m)   Wt 68 kg (149 lb 14 6 oz)   BMI 24 20 kg/m²       Left Shoulder Exam     Tenderness   The patient is experiencing no tenderness  Range of Motion   The patient has normal left shoulder ROM  Muscle Strength   External rotation: 4/5   Supraspinatus: 4/5     Tests   Bell test: positive  Cross arm: negative  Impingement: positive  Drop arm: positive    Other   Erythema: absent  Sensation: normal  Pulse: present     Comments:  +speeds            Physical Exam  Constitutional:       Appearance: She is well-developed     HENT:      Head: Normocephalic  Cardiovascular:      Rate and Rhythm: Normal rate and regular rhythm  Pulses: Normal pulses  Heart sounds: Normal heart sounds  Pulmonary:      Effort: Pulmonary effort is normal       Breath sounds: Normal breath sounds  No wheezing  Musculoskeletal:      Cervical back: Normal range of motion  Skin:     General: Skin is warm and dry  Neurological:      Mental Status: She is alert and oriented to person, place, and time  Psychiatric:         Behavior: Behavior normal          Thought Content:  Thought content normal          Judgment: Judgment normal          I personally reviewed the images in the PACS system my interpretation is as follows:  MRI scan left shoulder shows no obvious full-thickness rotator cuff tear, multiple metallic anchors are in place without evidence of hardware complication

## 2022-08-26 ENCOUNTER — TELEPHONE (OUTPATIENT)
Dept: OBGYN CLINIC | Facility: HOSPITAL | Age: 52
End: 2022-08-26

## 2022-08-26 NOTE — TELEPHONE ENCOUNTER
Patient would like to order a Sling for her L Shoulder, patient having surgery 09/30      Please Advise  CB: [phone]

## 2022-09-23 ENCOUNTER — ANESTHESIA EVENT (OUTPATIENT)
Dept: PERIOP | Facility: AMBULARY SURGERY CENTER | Age: 52
End: 2022-09-23
Payer: COMMERCIAL

## 2022-09-23 NOTE — TELEPHONE ENCOUNTER
Patient is calling back to check on the status of her request   Previous task was not routed to the provider or the clinical team   Please place an order for a sling and please call Didi Shelby at [phone] to advise of order and how she can obtain the sling prior to surgery next week

## 2022-09-26 DIAGNOSIS — S46.012A ROTATOR CUFF STRAIN, LEFT, INITIAL ENCOUNTER: ICD-10-CM

## 2022-09-26 DIAGNOSIS — M24.812 INTERNAL DERANGEMENT OF LEFT SHOULDER: Primary | ICD-10-CM

## 2022-09-26 NOTE — TELEPHONE ENCOUNTER
[facility] rep contacted patient  Arrangements made for patient to go to [facility] to get fitted as [facility] [place]

## 2022-09-28 RX ORDER — CYCLOSPORINE 0.5 MG/ML
EMULSION OPHTHALMIC
COMMUNITY
Start: 2022-09-25 | End: 2022-10-20

## 2022-09-28 NOTE — PRE-PROCEDURE INSTRUCTIONS
Pre-Surgery Instructions:   Medication Instructions    Ascorbic Acid (VITAMIN C PO) Pt already holding this medication   atenolol (TENORMIN) 25 mg tablet Take this medication day of surgery if normally taken in the morning   CALCIUM PO Pt already holding this medication    clonazePAM (KlonoPIN) 0 5 mg tablet May use day of surgery if needed      cycloSPORINE (RESTASIS) 0 05 % ophthalmic emulsion May use day of surgery if needed      ELDERBERRY PO Pt already holding this medication    estradiol (ESTRACE) 0 1 mg/g vaginal cream Hold day of surgery      ibuprofen (MOTRIN) 800 mg tablet Hold from now till after procedure unless prescribed by a Physician   Lumigan 0 01 % ophthalmic drops Take this medication day of surgery if normally taken in the morning   multivitamin (THERAGRAN) TABS Pt already holding this medication    ondansetron (ZOFRAN) 4 mg tablet Hold day of surgery      pantoprazole (PROTONIX) 40 mg tablet Take this medication day of surgery if normally taken in the morning  My Surgical Experience    The following information was developed to assist you to prepare for your operation  What do I need to do before coming to the hospital?   Arrange for a responsible person to drive you to and from the hospital    Arrange care for your children at home  Children are not allowed in the recovery areas of the hospital   Plan to wear clothing that is easy to put on and take off  If you are having shoulder surgery, wear a shirt that buttons or zippers in the front  Bathing  o Shower the evening before and the morning of your surgery with an antibacterial soap  Please refer to the Pre Op Showering Instructions for Surgery Patients Sheet   o Remove nail polish and all body piercing jewelry  o Do not shave any body part for at least 24 hours before surgery-this includes face, arms, legs and upper body  Food  o Nothing to eat or drink after midnight the night before your surgery   This includes candy and chewing gum  o Exception: If your surgery is after 12:00pm (noon), you may have clear liquids such as 7-Up®, ginger ale, apple or cranberry juice, Jell-O®, water, or clear broth until 8:00 am  o Do not drink milk or juice with pulp on the morning before surgery  o Do not drink alcohol 24 hours before surgery  Medicine  o Follow instructions you received from your surgeon about which medicines you may take on the day of surgery  o If instructed to take medicine on the morning of surgery, take pills with just a small sip of water  Call your prescribing doctor for specific infroamtion on what to do if you take insulin    What should I bring to the hospital?    Bring:  Yesy Potters or a walker, if you have them, for foot or knee surgery   A list of the daily medicines, vitamins, minerals, herbals and nutritional supplements you take  Include the dosages of medicines and the time you take them each day   Glasses, dentures or hearing aids   Minimal clothing; you will be wearing hospital sleepwear   Photo ID; required to verify your identity   If you have a Living Will or Power of , bring a copy of the documents   If you have an ostomy, bring an extra pouch and any supplies you use    Do not bring   Medicines or inhalers   Money, valuables or jewelry    What other information should I know about the day of surgery?  Notify your surgeons if you develop a cold, sore throat, cough, fever, rash or any other illness   Report to the Ambulatory Surgical/Same Day Surgery Unit   You will be instructed to stop at Registration only if you have not been pre-registered   Inform your  fi they do not stay that they will be asked by the staff to leave a phone number where they can be reached   Be available to be reached before surgery   In the event the operating room schedule changes, you may be asked to come in earlier or later than expected    *It is important to tell your doctor and others involved in your health care if you are taking or have been taking any non-prescription drugs, vitamins, minerals, herbals or other nutritional supplements   Any of these may interact with some food or medicines and cause a reaction

## 2022-09-30 ENCOUNTER — ANESTHESIA (OUTPATIENT)
Dept: PERIOP | Facility: AMBULARY SURGERY CENTER | Age: 52
End: 2022-09-30
Payer: COMMERCIAL

## 2022-09-30 ENCOUNTER — NURSE TRIAGE (OUTPATIENT)
Dept: OTHER | Facility: OTHER | Age: 52
End: 2022-09-30

## 2022-09-30 ENCOUNTER — HOSPITAL ENCOUNTER (OUTPATIENT)
Facility: AMBULARY SURGERY CENTER | Age: 52
Setting detail: OUTPATIENT SURGERY
Discharge: HOME | End: 2022-09-30
Attending: ORTHOPAEDIC SURGERY | Admitting: ORTHOPAEDIC SURGERY
Payer: COMMERCIAL

## 2022-09-30 VITALS
TEMPERATURE: 97.2 F | RESPIRATION RATE: 18 BRPM | WEIGHT: 151 LBS | DIASTOLIC BLOOD PRESSURE: 79 MMHG | BODY MASS INDEX: 24.27 KG/M2 | SYSTOLIC BLOOD PRESSURE: 124 MMHG | HEIGHT: 66 IN | OXYGEN SATURATION: 98 % | HEART RATE: 74 BPM

## 2022-09-30 DIAGNOSIS — M24.812 INTERNAL DERANGEMENT OF LEFT SHOULDER: Primary | ICD-10-CM

## 2022-09-30 DIAGNOSIS — S46.012D ROTATOR CUFF STRAIN, LEFT, SUBSEQUENT ENCOUNTER: ICD-10-CM

## 2022-09-30 PROCEDURE — 29823 SHO ARTHRS SRG XTNSV DBRDMT: CPT | Performed by: ORTHOPAEDIC SURGERY

## 2022-09-30 PROCEDURE — NC001 PR NO CHARGE: Performed by: ORTHOPAEDIC SURGERY

## 2022-09-30 PROCEDURE — C9290 INJ, BUPIVACAINE LIPOSOME: HCPCS | Performed by: STUDENT IN AN ORGANIZED HEALTH CARE EDUCATION/TRAINING PROGRAM

## 2022-09-30 RX ORDER — ONDANSETRON 2 MG/ML
4 INJECTION INTRAMUSCULAR; INTRAVENOUS ONCE AS NEEDED
Status: DISCONTINUED | OUTPATIENT
Start: 2022-09-30 | End: 2022-09-30 | Stop reason: HOSPADM

## 2022-09-30 RX ORDER — SODIUM CHLORIDE, SODIUM LACTATE, POTASSIUM CHLORIDE, CALCIUM CHLORIDE 600; 310; 30; 20 MG/100ML; MG/100ML; MG/100ML; MG/100ML
125 INJECTION, SOLUTION INTRAVENOUS CONTINUOUS
Status: DISCONTINUED | OUTPATIENT
Start: 2022-09-30 | End: 2022-09-30 | Stop reason: HOSPADM

## 2022-09-30 RX ORDER — LIDOCAINE HYDROCHLORIDE 10 MG/ML
0.5 INJECTION, SOLUTION EPIDURAL; INFILTRATION; INTRACAUDAL; PERINEURAL ONCE AS NEEDED
Status: COMPLETED | OUTPATIENT
Start: 2022-09-30 | End: 2022-09-30

## 2022-09-30 RX ORDER — MIDAZOLAM HYDROCHLORIDE 2 MG/2ML
INJECTION, SOLUTION INTRAMUSCULAR; INTRAVENOUS
Status: COMPLETED | OUTPATIENT
Start: 2022-09-30 | End: 2022-09-30

## 2022-09-30 RX ORDER — ACETAMINOPHEN 325 MG/1
650 TABLET ORAL EVERY 6 HOURS PRN
Status: CANCELLED | OUTPATIENT
Start: 2022-09-30

## 2022-09-30 RX ORDER — FENTANYL CITRATE 50 UG/ML
INJECTION, SOLUTION INTRAMUSCULAR; INTRAVENOUS
Status: COMPLETED | OUTPATIENT
Start: 2022-09-30 | End: 2022-09-30

## 2022-09-30 RX ORDER — LIDOCAINE HYDROCHLORIDE 20 MG/ML
INJECTION, SOLUTION EPIDURAL; INFILTRATION; INTRACAUDAL; PERINEURAL AS NEEDED
Status: DISCONTINUED | OUTPATIENT
Start: 2022-09-30 | End: 2022-09-30

## 2022-09-30 RX ORDER — HYDROCODONE BITARTRATE AND ACETAMINOPHEN 5; 325 MG/1; MG/1
1 TABLET ORAL EVERY 6 HOURS PRN
Qty: 12 TABLET | Refills: 0 | Status: SHIPPED | OUTPATIENT
Start: 2022-09-30

## 2022-09-30 RX ORDER — ONDANSETRON 2 MG/ML
INJECTION INTRAMUSCULAR; INTRAVENOUS AS NEEDED
Status: DISCONTINUED | OUTPATIENT
Start: 2022-09-30 | End: 2022-09-30

## 2022-09-30 RX ORDER — PROPOFOL 10 MG/ML
INJECTION, EMULSION INTRAVENOUS AS NEEDED
Status: DISCONTINUED | OUTPATIENT
Start: 2022-09-30 | End: 2022-09-30

## 2022-09-30 RX ORDER — ONDANSETRON 2 MG/ML
4 INJECTION INTRAMUSCULAR; INTRAVENOUS EVERY 6 HOURS PRN
Status: CANCELLED | OUTPATIENT
Start: 2022-09-30

## 2022-09-30 RX ORDER — DEXAMETHASONE SODIUM PHOSPHATE 10 MG/ML
INJECTION, SOLUTION INTRAMUSCULAR; INTRAVENOUS AS NEEDED
Status: DISCONTINUED | OUTPATIENT
Start: 2022-09-30 | End: 2022-09-30

## 2022-09-30 RX ORDER — EPHEDRINE SULFATE 50 MG/ML
INJECTION INTRAVENOUS AS NEEDED
Status: DISCONTINUED | OUTPATIENT
Start: 2022-09-30 | End: 2022-09-30

## 2022-09-30 RX ORDER — BUPIVACAINE HYDROCHLORIDE 5 MG/ML
INJECTION, SOLUTION PERINEURAL
Status: COMPLETED | OUTPATIENT
Start: 2022-09-30 | End: 2022-09-30

## 2022-09-30 RX ORDER — HYDROCODONE BITARTRATE AND ACETAMINOPHEN 5; 325 MG/1; MG/1
1 TABLET ORAL EVERY 6 HOURS PRN
Status: DISCONTINUED | OUTPATIENT
Start: 2022-09-30 | End: 2022-09-30 | Stop reason: HOSPADM

## 2022-09-30 RX ORDER — DIPHENHYDRAMINE HYDROCHLORIDE 50 MG/ML
INJECTION INTRAMUSCULAR; INTRAVENOUS AS NEEDED
Status: DISCONTINUED | OUTPATIENT
Start: 2022-09-30 | End: 2022-09-30

## 2022-09-30 RX ORDER — KETOROLAC TROMETHAMINE 30 MG/ML
INJECTION, SOLUTION INTRAMUSCULAR; INTRAVENOUS AS NEEDED
Status: DISCONTINUED | OUTPATIENT
Start: 2022-09-30 | End: 2022-09-30

## 2022-09-30 RX ORDER — CEFAZOLIN SODIUM 2 G/50ML
2000 SOLUTION INTRAVENOUS ONCE
Status: COMPLETED | OUTPATIENT
Start: 2022-09-30 | End: 2022-09-30

## 2022-09-30 RX ADMIN — DIPHENHYDRAMINE HYDROCHLORIDE 12.5 MG: 50 INJECTION INTRAMUSCULAR; INTRAVENOUS at 08:32

## 2022-09-30 RX ADMIN — SODIUM CHLORIDE, SODIUM LACTATE, POTASSIUM CHLORIDE, AND CALCIUM CHLORIDE: .6; .31; .03; .02 INJECTION, SOLUTION INTRAVENOUS at 08:05

## 2022-09-30 RX ADMIN — PROPOFOL 150 MG: 10 INJECTION, EMULSION INTRAVENOUS at 08:37

## 2022-09-30 RX ADMIN — ONDANSETRON 4 MG: 2 INJECTION INTRAMUSCULAR; INTRAVENOUS at 08:05

## 2022-09-30 RX ADMIN — LIDOCAINE HYDROCHLORIDE 0.5 ML: 10 INJECTION, SOLUTION EPIDURAL; INFILTRATION; INTRACAUDAL at 08:08

## 2022-09-30 RX ADMIN — EPHEDRINE SULFATE 5 MG: 50 INJECTION, SOLUTION INTRAVENOUS at 09:01

## 2022-09-30 RX ADMIN — MIDAZOLAM HYDROCHLORIDE 2 MG: 1 INJECTION, SOLUTION INTRAMUSCULAR; INTRAVENOUS at 08:08

## 2022-09-30 RX ADMIN — FENTANYL CITRATE 25 MCG: 50 INJECTION, SOLUTION INTRAMUSCULAR; INTRAVENOUS at 08:08

## 2022-09-30 RX ADMIN — BUPIVACAINE HYDROCHLORIDE 7 ML: 5 INJECTION, SOLUTION PERINEURAL at 08:08

## 2022-09-30 RX ADMIN — DEXAMETHASONE SODIUM PHOSPHATE 5 MG: 10 INJECTION, SOLUTION INTRAMUSCULAR; INTRAVENOUS at 08:37

## 2022-09-30 RX ADMIN — BUPIVACAINE 20 ML: 13.3 INJECTION, SUSPENSION, LIPOSOMAL INFILTRATION at 08:08

## 2022-09-30 RX ADMIN — LIDOCAINE HYDROCHLORIDE 100 MG: 20 INJECTION, SOLUTION EPIDURAL; INFILTRATION; INTRACAUDAL at 08:37

## 2022-09-30 RX ADMIN — CEFAZOLIN SODIUM 2000 MG: 2 SOLUTION INTRAVENOUS at 08:32

## 2022-09-30 RX ADMIN — KETOROLAC TROMETHAMINE 30 MG: 30 INJECTION, SOLUTION INTRAMUSCULAR at 08:32

## 2022-09-30 NOTE — DISCHARGE INSTRUCTIONS
You are being scheduled for a shoulder arthroscopy to treat your symptoms  Below are some instructions and information on what to expect before and after your surgery  Pre-Surgical Preparation for Arthroscopic Shoulder Surgery: You will be contacted the evening prior to your surgery to confirm the scheduled time of the procedure and when to arrive at the hospital    Do not eat or drink anything after midnight the night before your surgery  Since you are having out-patient surgery, make sure that you have someone who can drive you home later in the day  Also, prepare that person for a long day, as the process of safely preparing for and recovering from the procedure is more time consuming than the actual procedure! As you will be in a sling after surgery, please wear or bring a loose fitting button-down shirt so that you can easily place this over the sling when you leave the surgical suite  This avoids having to place the operative arm in a sleeve  Most patients find that this is the easiest outfit to wear for the first week or so after surgery so you may want to plan accordingly  Most patients find that lying down in bed after shoulder surgery accentuates their discomfort  This is likely related to the effect of gravity on the swelling in the shoulder  As a result, most patients sleep better in a recliner or in bed with pillows propped up behind their back for the first few days or weeks after surgery  It is a good idea to plan for this ahead of time so there will be less hassle getting things set up the night after surgery  What to Expect After Arthroscopic Shoulder Surgery: It is normal to have swelling and discomfort in the shoulder for several days or a week after surgery  It is also normal to have a small amount of drainage from the surgical wounds (especially the first few days after surgery), as we put fluid into the shoulder to visualize the structures during surgery  It is NOT normal to have foul smelling, purulent drainage and if this is noted, please contact the office immediately or proceed to the emergency room for evaluation as this may indicate an infection  Applying ice bags to the shoulder may help with pain that is not controlled by the regional block  Ice should be applied 20-30 minutes at a time, every hour or two  Make sure to put a thin towel or T-shirt next to your skin to avoid direct contact of the ice with the skin  Icing is most helpful in the first 48 hours, although many people find that continuing past this time frame lessens their postoperative pain  Please note that your post-operative dressing is not conductive to ice, so if you need to, it is okay to remove that dressing even the night after surgery and place band-aids over the wounds in order for the ice to take effect  Pain Control    Most patients will receive a nerve block, the local anesthetic may keep your whole arm numb for up to 4 days  You will be given a prescription for narcotic pain medication when you are discharged from the hospital   With the newer nerve block that is being utilized, patients are rarely requiring the use of this narcotic pain medication  If you find you do not tolerate that type of pain medicine well, call our office and we will try another one  In addition to the narcotic pain medication, it is safe to use an anti-inflammatory (unless the patient has a medical condition that would not allow safe use of this mediation)  This includes the Advil, Motrin, Ibuprofen and Alleve category of medications  Simply follow the over the counter dosing on the package and use as indicated as another adjunct  Importantly since these medications are all very similar, use only one of them  Tylenol is a separate medication that can be utilized as well and can be taken at the same time as the other medication or given in a "staggered" manner    Just make sure that you follow the dosing on the over the counter bottle instructions  Also make sure that the pain medication prescribed by Dr Manny Lawton team does not contain acetaminophen (this is found in Percocet and Vicodin)  Typically we do not prescribe those types of pain medications but if for some reason that has been prescribed DO NOT add more Tylenol (acetaminophen) as you could end up taking too much of that medication  As mentioned above, most patients find that lying down accentuates their discomfort  You might sleep better in a recliner, or propped up in bed  Dr Paz Toscano encourages patients to safely ambulate around the house as much as possible in the first few days after the procedure as this can help with blood circulation in the legs  While the incidence of blood clots is very rare following shoulder surgery, early ambulation is a great way to help decrease the already low rate  24 hours after the surgery you may remove the bandage and cover incisions with Band-Aids if needed  At that time you may shower, the wounds will have a surgical glue that will protect them from shower water but do not submerge your incisions directly (bathing or swimming) until at least 2 weeks post-operatively  It is safe to let the arm hang at the side and take a shower and put on a shirt without the sling on  Just make sure that you do not use the operative are to reach out and grab anything as that may damage the repair  When you are done showering and getting dressed please return the operative arm to the sling  Unless noted otherwise in your discharge paperwork, Dr Paz Toscano uses absorbable sutures so they do not need to be removed  Dr Charline Puri physician assistant (PA) will see you in the office a few days after the procedure to review the intra-operative findings and to initiate physical therapy if appropriate    A post-operative appointment should have been scheduled for you already, but if for some reason this did not happen, please call the office to make one  Physical therapy is important after nearly all shoulder surgeries and a detailed rehabilitation plan based on the specific intra-operative findings and procedures will be provided to your therapist at the first post-operative office visit  Most patients have post-operative therapy appointments scheduled pre-operatively, but if you do not, that will be handled at the first post-operative office visit  Unless expressly directed otherwise it is safe to remove the sling even the first day after the surgery and let the arm hang by the side  This allows patients to shower and even put a shirt on (bad arm in the sleeve first)  It is also safe to flex and extend their wrist, hand and fingers as much as possible when the block wears off  These simple motions can serve to pump fluid out of the forearm and decrease swelling in the arm

## 2022-09-30 NOTE — OP NOTE
OPERATIVE REPORT  PATIENT NAME: Emaline Schilder    :  1970  MRN: [id number]  Pt Location: [facility] OR [facility] 06    SURGERY DATE: 2022     SURGEON: Rubens Todd MD     ASSISTANT: Trae Weiner PA-C     NOTE: Trae Weiner PA-C was present throughout the entire procedure and performed essential assistance with patient prepping, draping, positioning, suture management, wound closure, sterile dressing application and sling application, all under my direct supervision  RESIDENT ASSITANT: Tiffany Mukherjee MD PGY-2 Assisted in the procedure  IPily, was present for the entire procedure and was scrubbed for and performed all the key and essential components of the procedure  PREOPERATIVE DIAGNOSIS:  Left Shoulder Pain, Suspected Internal Derrangment    POSTOPERATIVE DIAGNOSIS: Left Shoulder Type 1 SLAP Tear, Degenerative Tearing Posterior and Anterior Labrum, Bursal Sided Fraying Supraspinatus    PROCEDURES: Surgical Arthroscopy Left Shoulder with Extensive Debridement    ANESTHESIA STAFF: Chad Gamez MD     ANESTHESIA TYPE: General LMA with ultrasound guided interscalene block (Exparel)  The interscalene block was provided by the anesthesia staff per my request for postoperative pain control and to decrease the use of postoperative narcotic medication for pain control  COMPLICATIONS: None    FINDINGS: Type 1 SLAP Tear, Degenerative Tearing Posterior and Anterior Labrum, Bursal Sided Fraying Supraspinatus    SPECIMEN(S):  None    ESTIMATED BLOOD LOSS: Minimal    INDICATION:  Briefly, the patient is a 47 y o   female with left shoulder pain  MRI scan did not reveal any obvious pathology although there was significant scatter from the metallic anchor used for her previous labral repair and since she failed to improve with nonoperative care, she patient elected for arthroscopic evaluation and treatment   Informed consent was obtained after a thorough discussion of the risks and benefits of the procedure, as well as alternatives to the procedure  OPERATIVE TECHNIQUE:  On the day of surgery, I identified the patients left shoulder and marked it with my initials  The patient was taken to the operating room where anesthesia was induced and 2 grams of IV Cefazolin were given  The patient was examined in the supine position and was found to have full range of motion of the left shoulder with no instability  The patient was then positioned in the [address] [place] [address] position  All bony prominences were padded  The shoulder was prepped and draped in normal sterile fashion  After a time-out for safety, a standard posterior arthroscopic portal was made  Glenohumeral evaluation revealed intact glenohumeral articular cartilage with no loose bodies  There was evidence of prior posterior inferior labral repair with suture present around the labrum and degenerative changes of the posterior labrum which extended to the anterior labrum but no detachment of the labrum from the glenoid  There was degenerative tearing of the superior labrum consistent with a Type I SLAP lesion, the long-head biceps tendon anchor complex was probed extensively and found to be stable  The long-head biceps itself was without partial tear or instability, bicipital pulley was intact  The undersurface of supraspinatus and infraspinatus were intact and the insertion of subscapularis was without partial tear  An anterior portal was established and a debridement of the Type I SLAP lesion was performed to a stable border, after debridement this was probed and again confirmed to have a stable long-head biceps tendon anchor complex so repair was not indicated  The debridement was continued with the posterior and anterior glenoid labrum, this was performed to a stable border using the shaving device and this was probed and found to be not detached from the glenoid and therefore no revision repair was indicated  After the intra-articular work was completed, the scope was then placed in the subacromial space through the same posterior portal where a thorough bursectomy was performed  The bursal side of supraspinatus had partial fraying but no significant tear requiring repair, the infraspinatus was intact and the CA ligament had some mild fraying consistent with subacromial impingement  A shaving device was introduced through the anterior portal and a debridement of the bursal sided supraspinatus and the CA ligament was performed to a stable border  The area was then irrigated  Scope was withdrawn  Wounds were closed with 4-0 Monocryl and Histoacryl  Sterile dressings and a sling with an abduction pillow was placed  The patient was awoken without complication and returned to the recovery room in good condition  We will see the patient back in the office next week to initiate therapy allowing discontinuation of the sling and focusing on active and passive range of motion to tolerance  This can begin postoperative day 3 and can progress to strengthening as the range of motion returns and typically strengthening is able to be initiated around weeks 2 to 3 postoperatively  At the end of procedure, the counts were correct       PATIENT DISPOSITION:  Stable to PACU      SIGNATURE: Sam Small  DATE: September 30, 2022  TIME: 9:18 AM

## 2022-09-30 NOTE — ANESTHESIA PROCEDURE NOTES
Peripheral Block    Patient location during procedure: holding area  Start time: 9/30/2022 8:08 AM  Reason for block: at surgeon's request and post-op pain management  Staffing  Performed: Anesthesiologist   Anesthesiologist: Mayra Falcon MD  Preanesthetic Checklist  Completed: patient identified, IV checked, site marked, risks and benefits discussed, surgical consent, monitors and equipment checked, pre-op evaluation and timeout performed  Peripheral Block  Patient position: sitting  Prep: ChloraPrep  Patient monitoring: heart rate, cardiac monitor, continuous pulse ox and frequent blood pressure checks  Block type: interscalene  Laterality: left  Injection technique: single-shot  Procedures: ultrasound guided, Ultrasound guidance required for the procedure to increase accuracy and safety of medication placement and decrease risk of complications    Ultrasound permanent image savedlidocaine (PF) (XYLOCAINE-MPF) 1 % injection 0 5 mL - Infiltration   0 5 mL - 9/30/2022 8:08:00 AM  bupivacaine (MARCAINE) 0 5 % - Perineural   7 mL - 9/30/2022 8:08:00 AM  midazolam (VERSED) 2 mg/2 mL - Intravenous   2 mg - 9/30/2022 8:08:00 AM  fentaNYL 50 mcg/mL - Intravenous   25 mcg - 9/30/2022 8:08:00 AM  Needle  Needle type: Stimuplex   Needle gauge: 22 G  Needle length: 5 cm  Needle localization: ultrasound guidance  Assessment  Injection assessment: incremental injection, local visualized surrounding nerve on ultrasound, negative aspiration for heme and no paresthesia on injection  Paresthesia pain: none  Heart rate change: no  Slow fractionated injection: yes  Post-procedure:  site cleaned  patient tolerated the procedure well with no immediate complications  Additional Notes  Pt alert, comfortable  Unusual nerve anatomy/location but approached easily x 1 pass  Transient parasthesia with initial injection - immediately stopped and needle withdrawn slightly

## 2022-09-30 NOTE — ANESTHESIA PREPROCEDURE EVALUATION
Procedure:  SHOULDER ARTHROSCOPY - diagnostic with repair if indicated (Left Shoulder)    Relevant Problems   ANESTHESIA  reports nerve block on right only lasted 4 hours post op   (+) PONV (postoperative nausea and vomiting) (well controlled with prior shoulder surgeries)      CARDIO   (+) Atrial fibrillation (HCC) (1 time episode)   (+) Hyperlipidemia   (+) Migraines   (+) SVT (supraventricular tachycardia) (HCC)      GI/HEPATIC   (+) Esophageal dysphagia   (+) Gastroesophageal reflux disease (controlled currently)      NEURO/PSYCH   (+) Anxiety   (+) Migraines      PULMONARY   (-) Sleep apnea   (-) Smoking   (-) URI (upper respiratory infection)      Musculoskeletal and Integument   (+) Internal derangement of left shoulder      Physical Exam    Airway    Mallampati score: I  TM Distance: >3 FB  Neck ROM: full     Dental   No notable dental hx     Cardiovascular      Pulmonary      Other Findings  Normal sensation left hand/arm       Anesthesia Plan  ASA Score- 2     Anesthesia Type- general with ASA Monitors  Additional Monitors:   Airway Plan: LMA  Comment: IS block/exparel discussed  Plan Factors-Exercise tolerance (METS): >4 METS  Chart reviewed  Existing labs reviewed  Patient summary reviewed  Patient is not a current smoker  Induction- intravenous  Postoperative Plan-     Informed Consent- Anesthetic plan and risks discussed with patient  I personally reviewed this patient with the CRNA  Discussed and agreed on the Anesthesia Plan with the MIR Brenner

## 2022-09-30 NOTE — TELEPHONE ENCOUNTER
Patient's SXS all sound like her block is wearing off  It started with her able to move her fingers some then the "pins and needles" started  She was surprised because she was told it could last for up to 2 days  She did not have her arm resting up on a pillow or two, so she will do that and take some Advil now  I told her to call back if she had any other questions or concerns  She will comply  She did agree that when her right shoulder was done this is how it felt when the block was wearing off

## 2022-09-30 NOTE — TELEPHONE ENCOUNTER
Reason for Disposition   Other post-op symptom or question    Answer Assessment - Initial Assessment Questions  1  SYMPTOM: "What's the main symptom you're concerned about?" (e g , pain, fever, vomiting)      Tingling feeling-pins and needles sensation  2  ONSET: "When did tingling  start?"      Just now  3  SURGERY: "What surgery was performed?"      Arthroscopy left shoulder with debridement  4  DATE of SURGERY: "When was surgery performed?"       today  5  ANESTHESIA: " What type of anesthesia did you have?" (e g , general, spinal, epidural, local)      General with a block  She states that the Anesthesiologist told her the block could last for maybe 2 days  Block was placed at 0808 this morning  6  PAIN: "Is there any pain?" If Yes, ask: "How bad is it?"  (Scale 1-10; or mild, moderate, severe)      No pain just a weird feeling like her block is wearing off  Feels tingly, swollen but it is not  Fingers are warm to touch and she can move them now  7  FEVER: "Do you have a fever?" If Yes, ask: "What is your temperature, how was it measured, and when did it start?"      No fever  8  VOMITING: "Is there any vomiting?" If yes, ask: "How many times?"      No vomiting  9  BLEEDING: "Is there any bleeding?" If Yes, ask: "How much?" and "Where?"      No bleeding  10   OTHER SYMPTOMS: "Do you have any other symptoms?" (e g , drainage from wound, painful urination, constipation)        None    Protocols used: POST-OP SYMPTOMS AND QUESTIONS-Formerly [facility], [facility]

## 2022-09-30 NOTE — PROGRESS NOTES
PT Evaluation     Today's date: 10/3/2022  Patient name: Hema Ortiz  : 1970  MRN: [id number]  Referring provider: Cesario Eric  Dx:   Encounter Diagnosis     ICD-10-CM    1  Acute pain of left shoulder  M25 512    2  S/P arthroscopy of left shoulder  Z98 890    3  Orthopedic aftercare  Z47 89                   Assessment  Assessment details: Patient is s/p 3 day(s)  left shoulder arthroscopy with extensive debridement  Patient is doing well post operatively  Incisions are clean and dry with no visible signs of infection  Pt has limited and restricted ROM of the shoulder as one would expect at this time  She lacks sensation of left UE in no dermatomal pattern,  due to nerve block  However, patient does have intact vascular system with normal capillary refill and distal pulses  Patient would benefit from skilled PT at this time to improve function, reduce pain, increase ROM, and return to premorbid status  Impairments: abnormal muscle tone, abnormal or restricted ROM, abnormal movement, activity intolerance, impaired physical strength, lacks appropriate home exercise program and pain with function  Understanding of Dx/Px/POC: good   Prognosis: good    Goals  Short Term Goals: to be achieved by 4 weeks  1) Patient to be independent with basic HEP  2) Decrease pain to 3/10 at its worst   3) Increase shoulder PROM by 5-10 degrees     Long Term Goals: to be achieved by discharge  1) FOTO equal to or greater than expected   2) Patient to be independent with comprehensive HEP  3) Abolish pain for improved quality of life  4) Increase shoulder ROM to within functional limits to improve a/iadls  5) Increase shoulder strength to 5/5 MMT grade in all planes to improve a/iadls  6) Patient to return to full duty at work       Plan  Patient would benefit from: skilled PT  Planned modality interventions: cryotherapy and TENS  Planned therapy interventions: manual therapy, neuromuscular re-education, patient education, therapeutic activities, therapeutic exercise and functional ROM exercises  Frequency: 2x per week for 4-6 weeks  Treatment plan discussed with: patient        Subjective Evaluation    History of Present Illness  Mechanism of injury: History of Current Injury: Pt is 3 days s/p left shoulder arthroscopy with extensive debridement with Dr Castellanos Pals  According to Op note, patient may begin passive/active motion as tolerated and progress strengthening starting at 2-3 weeks  Pt still has lingering effects of nerve block with loss of sensation to the left upper extremity  Pt currently has no pain because of nerve block  She does have extremity movement  Pt does have a sling on and was told to wear it for a few days  Pt is off of work this week and potentially returning next week  Special Questions:Pt has numbness and tingling throughout right UE  Patient goals:  Resume normal ADLs, return to normal exercise routine, drive, relieve pain   Hobbies/Interest: Exercises, walks  Occupation: Sales    Pain  Current pain ratin  At best pain ratin  At worst pain ratin    Hand dominance: right    Treatments  Previous treatment: physical therapy, medication and injection treatment  Current treatment: medication  Patient Goals  Patient goals for therapy: decreased pain, increased motion, increased strength, independence with ADLs/IADLs and return to sport/leisure activities          Objective     Neurological Testing     Sensation     Shoulder   Left Shoulder   Diminished: light touch, pin prick, hot/cold discrimination and proprioception    Comments   Left light touch:  Throughout LUE  Left pin prick: Throughout LUE    Reflexes   Left   Biceps (C5/C6): normal (2+)  Brachioradialis (C6): normal (2+)  Triceps (C7): normal (2+)  Rolle's reflex: negative    Passive Range of Motion   Left Shoulder   Flexion: 160 degrees   Abduction: 160 degrees   External rotation 45°: 80 degrees   Internal rotation 45°: 85 degrees     Additional Passive Range of Motion Details  Assessed to 1st resistance  Tests     Additional Tests Details   Strength: 55# R/5# L     *Normal AROM of wrist, hand, and elbow on the R  *Good capillary refill  *Intact distal pulses             Precautions: A-fib, Labral repair 2009, SVT      Manuals 10/3            PROM of L shoulder nv            Scap mobs L  nv                                       Neuro Re-Ed             Scap retractions 30x            C/s Retractions                                                                              Ther Ex             Pendulums cw/ccw 30x             Elbow ROM  HEP            Wrist ROM             UT stretch             LS stretch             T/S extension             Table slides Forward x 20 HEP            Pulley HEP            Elbow AROM 30x            Ther Activity                                       Gait Training                                       Modalities                                        Pt was given a HEP with verbal and written instruction x 10 minutes   M[employer]  Access Code: [email or web address]

## 2022-09-30 NOTE — H&P
I identified and marked the patient in the pre-op holding area after confirming the surgical consent  No changes to medical health since the H&P was preformed  The patient's prescription history was queried in the [employer] to ensure compliance with applicable state laws                                                              Assessment  Diagnoses and all orders for this visit:     Rotator cuff strain, left, subsequent encounter     Internal derangement of left shoulder        Discussion and Plan:     Patient has failed to improve with appropriate nonoperative care of her left shoulder and were unable to identify the causative pathology on her MRI scan possibly related to metallic anchors in place and the scatter that those anchors are causing  Given her persistent symptoms despite nonoperative care she is indicated at this time for arthroscopic evaluation and treatment based on intraoperative findings  She does wish to proceed forward with diagnostic arthroscopy of left shoulder and will be scheduled accordingly  Informed consent was obtained after a thorough discussion risks and benefits procedure as well as alternatives to the procedure     Subjective:   Patient ID: Kwan Andrade is a 47 y o  female        Yolageo Bernal returns in follow up of the left shoulder  She had a fall back in January  She followed up with Dr Mirna Ramos following a MRI which did not show any structural pathology  After a 6 week course of formal therapy, Lacy Bernal reports worsening of left shoulder pain  Per therapist, there is objective decline in IE parameters  Lacy Bernal reports pain with ROM, mostly with abduction  Admits to catching of the left shoulder with certain motions  Pain improves with rest   Admits to pain that interferes with sleep  Denies paresthesias    Denies recent injury or trauma         The following portions of the patient's history were reviewed and updated as appropriate: allergies, current medications, past family history, past medical history, past social history, past surgical history and problem list      Review of Systems   Constitutional: Negative for chills and fever  HENT: Negative for hearing loss  Eyes: Negative for visual disturbance  Respiratory: Negative for shortness of breath  Cardiovascular: Negative for chest pain  Gastrointestinal: Negative for abdominal pain  Musculoskeletal:        As reviewed in the HPI   Skin: Negative for rash  Neurological:        As reviewed in the HPI   Psychiatric/Behavioral: Negative for agitation          Objective:  /93   Pulse 80   Temp 98 2 °F (36 8 °C) (Temporal)   Resp 18   Ht 5' 6" (1 676 m)   Wt 68 5 kg (151 lb)   SpO2 96%   BMI 24 37 kg/m²           Left Shoulder Exam      Tenderness   The patient is experiencing no tenderness       Range of Motion   The patient has normal left shoulder ROM     Muscle Strength   External rotation: 4/5   Supraspinatus: 4/5      Tests   Bell test: positive  Cross arm: negative  Impingement: positive  Drop arm: positive     Other   Erythema: absent  Sensation: normal  Pulse: present      Comments:  +speeds                 Physical Exam  Constitutional:       Appearance: She is well-developed  HENT:      Head: Normocephalic  Cardiovascular:      Rate and Rhythm: Normal rate and regular rhythm  Pulses: Normal pulses  Heart sounds: Normal heart sounds  Pulmonary:      Effort: Pulmonary effort is normal       Breath sounds: Normal breath sounds  No wheezing  Musculoskeletal:      Cervical back: Normal range of motion  Skin:     General: Skin is warm and dry  Neurological:      Mental Status: She is alert and oriented to person, place, and time  Psychiatric:         Behavior: Behavior normal          Thought Content:  Thought content normal          Judgment: Judgment normal             I personally reviewed the images in the PACS system my interpretation is as follows:  MRI scan left shoulder shows no obvious full-thickness rotator cuff tear, multiple metallic anchors are in place without evidence of hardware complication

## 2022-09-30 NOTE — TELEPHONE ENCOUNTER
Regarding: Post op warmth, tingling of arm  ----- Message from Ken Lezama sent at 9/30/2022  6:39 PM EDT -----  "I had shoulder surgery today and now my arm is tingling from my fingers to my elbow and up my arm, my hand feels very swollen, but it's only a little swollen and my fingers feel very warm "

## 2022-09-30 NOTE — ANESTHESIA POSTPROCEDURE EVALUATION
Post-Op Assessment Note    CV Status:  Stable  Pain Score: 0    Pain management: adequate     Mental Status:  Awake and alert   Hydration Status:  Stable   PONV Controlled:  None   Airway Patency:  Patent and adequate      Post Op Vitals Reviewed: Yes      Staff: CRNA, Anesthesiologist         No complications documented      BP   108/63   Temp   97   Pulse 66   Resp 15   SpO2   94%

## 2022-10-02 ENCOUNTER — NURSE TRIAGE (OUTPATIENT)
Dept: OTHER | Facility: OTHER | Age: 52
End: 2022-10-02

## 2022-10-02 NOTE — TELEPHONE ENCOUNTER
Dr Dinah Arrieta on call was made aware of patient's symptoms  Per Dr Dinah Arrieta advise, Dr Melodie Deal, anesthesiologies on call was made aware of patient's symptoms  Per Dr Dinah Arrieta, patient's call was addressed to anesthesiology

## 2022-10-02 NOTE — TELEPHONE ENCOUNTER
Triage RN addressed the patient's call to Dr Peter Lee on call for Anesthesiology in [facility]

## 2022-10-02 NOTE — TELEPHONE ENCOUNTER
Reason for Disposition   [1] Caller has URGENT question AND [2] triager unable to answer question    Additional Information   Negative: [1] SEVERE post-op pain (e g , excruciating, pain scale 8-10) AND [2] not controlled with pain medications    Protocols used: [facility]ST[facility]

## 2022-10-02 NOTE — TELEPHONE ENCOUNTER
Regarding: Numbness/Pins and needles in left arm  ----- Message from Andreia Salazar sent at 10/2/2022 10:05 AM EDT -----  "I am having numbness in my wrist and arm and I feel pins and needles in my fingers   I just had surgery on Fri for a nerve block in my left shoulder "

## 2022-10-02 NOTE — TELEPHONE ENCOUNTER
Answer Assessment - Initial Assessment Questions  1  SYMPTOM: "What's the main symptom you're concerned about?" (e g , pain, fever, vomiting)      Patient stated that "she cannot feel part of her left shoulder", pins and needles sensation,   2  ONSET: "When did  The symptoms  start?"      After the surgery on 9/30/22   3  SURGERY: "What surgery was performed?"      Rotator cuff, nerve block   4  DATE of SURGERY: "When was surgery performed?"       9/30/22   6  PAIN: "Is there any pain?" If Yes, ask: "How bad is it?"  (Scale 1-10; or mild, moderate, severe)       Painful left fingers   7  FEVER: "Do you have a fever?" If Yes, ask: "What is your temperature, how was it measured, and when did it start?"       No   8  VOMITING: "Is there any vomiting?" If yes, ask: "How many times?"       No   9  BLEEDING: "Is there any bleeding?" If Yes, ask: "How much?" and "Where?"       No   10   OTHER SYMPTOMS: "Do you have any other symptoms?" (e g , drainage from wound, painful urination, constipation)        No    Protocols used: POST-OP SYMPTOMS AND QUESTIONS-[facility]

## 2022-10-03 ENCOUNTER — TELEPHONE (OUTPATIENT)
Dept: OBGYN CLINIC | Facility: MEDICAL CENTER | Age: 52
End: 2022-10-03

## 2022-10-03 ENCOUNTER — OFFICE VISIT (OUTPATIENT)
Dept: PHYSICAL THERAPY | Facility: CLINIC | Age: 52
End: 2022-10-03
Payer: COMMERCIAL

## 2022-10-03 DIAGNOSIS — Z47.89 ORTHOPEDIC AFTERCARE: ICD-10-CM

## 2022-10-03 DIAGNOSIS — Z98.890 S/P ARTHROSCOPY OF LEFT SHOULDER: ICD-10-CM

## 2022-10-03 DIAGNOSIS — M25.512 ACUTE PAIN OF LEFT SHOULDER: Primary | ICD-10-CM

## 2022-10-03 PROCEDURE — 97110 THERAPEUTIC EXERCISES: CPT | Performed by: PHYSICAL THERAPIST

## 2022-10-03 PROCEDURE — 97161 PT EVAL LOW COMPLEX 20 MIN: CPT | Performed by: PHYSICAL THERAPIST

## 2022-10-03 NOTE — TELEPHONE ENCOUNTER
Patient sees Dr Joelle Tyler  Patient has surgery on left shoulder on Friday, she is experiencing tingling, cold where the incision is but everything below is numb, but she can bend her fingers  She is asking for a call back      # [phone]

## 2022-10-03 NOTE — TELEPHONE ENCOUNTER
Spoke to patient and advised this is normal with nerve block  Patient has good cap refill to finger nail beds  She will continue to flex and ext fingers several times per hour  Cannot use ice due to allergy to ice on skin causing hives  She will call with any further questions or concerns

## 2022-10-06 ENCOUNTER — APPOINTMENT (OUTPATIENT)
Dept: PHYSICAL THERAPY | Facility: CLINIC | Age: 52
End: 2022-10-06

## 2022-10-07 ENCOUNTER — OFFICE VISIT (OUTPATIENT)
Dept: PHYSICAL THERAPY | Facility: CLINIC | Age: 52
End: 2022-10-07
Payer: COMMERCIAL

## 2022-10-07 DIAGNOSIS — Z98.890 S/P ARTHROSCOPY OF LEFT SHOULDER: ICD-10-CM

## 2022-10-07 DIAGNOSIS — M25.512 ACUTE PAIN OF LEFT SHOULDER: Primary | ICD-10-CM

## 2022-10-07 DIAGNOSIS — Z47.89 ORTHOPEDIC AFTERCARE: ICD-10-CM

## 2022-10-07 PROCEDURE — 97110 THERAPEUTIC EXERCISES: CPT | Performed by: PHYSICAL THERAPIST

## 2022-10-07 PROCEDURE — 97140 MANUAL THERAPY 1/> REGIONS: CPT | Performed by: PHYSICAL THERAPIST

## 2022-10-07 PROCEDURE — 97112 NEUROMUSCULAR REEDUCATION: CPT | Performed by: PHYSICAL THERAPIST

## 2022-10-07 NOTE — PROGRESS NOTES
Daily Note     Today's date: 10/7/2022  Patient name: José Graham  : 1970  MRN: [id number]  Referring provider: Myra Conley  Dx:   Encounter Diagnosis     ICD-10-CM    1  Acute pain of left shoulder  M25 512    2  S/P arthroscopy of left shoulder  Z98 890    3  Orthopedic aftercare  Z47 89        Start Time: 08  Stop Time:   Total time in clinic (min): 43 minutes    Subjective: Pt overall doing well  Pain is controlled  Mild numbness at distal fingers but otherwise has progressed HEP  Objective: See treatment diary below      Assessment: Pt 1 week post on and progressing well  PROM improve nicely in all planes of motion  Initiated AAROM with good tolerance and no adverse effects  If symptoms well controlled, we will progress AROM next week  Patient would benefit from continued PT      Plan: Continue per plan of care        Precautions: A-fib, Labral repair , SVT, s/p L shoulder arthroscopy       Manuals 10/3 10/7           PROM of L shoulder nv 8'           Scap mobs L  nv             Rhythmic stabs  gentle ER/IR 2x10                        Neuro Re-Ed             Scap retractions 30x 30x           C/s Retractions             Serratus press  AAROM 20x                                                               Ther Ex             Pendulums cw/ccw 30x  30x all planes           Elbow ROM  HEP 30x           Wrist ROM             UT stretch             LS stretch             T/S extension             Table slides Forward x 20 HEP 30x flexion/30x abd           Pulley HEP            Elbow AROM 30x 30x           SL ER  AAROM 2x10            SL ABD  AAROM 2x10            Ther Activity                                       Gait Training                                       Modalities                                       1:1 with PT from [phone]

## 2022-10-11 ENCOUNTER — OFFICE VISIT (OUTPATIENT)
Dept: PHYSICAL THERAPY | Facility: CLINIC | Age: 52
End: 2022-10-11
Payer: COMMERCIAL

## 2022-10-11 DIAGNOSIS — M25.512 ACUTE PAIN OF LEFT SHOULDER: Primary | ICD-10-CM

## 2022-10-11 DIAGNOSIS — Z98.890 S/P ARTHROSCOPY OF LEFT SHOULDER: ICD-10-CM

## 2022-10-11 DIAGNOSIS — Z47.89 ORTHOPEDIC AFTERCARE: ICD-10-CM

## 2022-10-11 PROCEDURE — 97110 THERAPEUTIC EXERCISES: CPT | Performed by: PHYSICAL THERAPIST

## 2022-10-11 PROCEDURE — 97140 MANUAL THERAPY 1/> REGIONS: CPT | Performed by: PHYSICAL THERAPIST

## 2022-10-11 PROCEDURE — 97112 NEUROMUSCULAR REEDUCATION: CPT | Performed by: PHYSICAL THERAPIST

## 2022-10-11 NOTE — PROGRESS NOTES
Daily Note     Today's date: 10/11/2022  Patient name: Lis Moreno  : 1970  MRN: [id number]  Referring provider: Apollo Dillard  Dx:   Encounter Diagnosis     ICD-10-CM    1  Acute pain of left shoulder  M25 512    2  S/P arthroscopy of left shoulder  Z98 890    3  Orthopedic aftercare  Z47 89                   Subjective: Pt reports a bruising like pain at posterolateral shoulder  Objective: See treatment diary below      Assessment: Pt progressing well through post op program  Pt will be 2 weeks post op on Friday  Initiated AROM today with good tolerance and only mild discomfort  Pt did require cueing for correct performance  Tolerated treatment well  Patient would benefit from continued PT      Plan: Continue per plan of care        Precautions: A-fib, Labral repair , SVT, s/p L shoulder arthroscopy       Manuals 10/3 10/7 10/11          PROM of L shoulder nv 8' 8'          Scap mobs L  nv             Rhythmic stabs  gentle ER/IR 2x10 gentle ER/IR 2x10                       Neuro Re-Ed             Scap retractions 30x 30x           C/s Retractions             Serratus press  AAROM 20x 20x          Prone shoulder Row   1x10          Prone shoulder ext   1x10                                    Ther Ex             Pendulums cw/ccw 30x  30x all planes 30x all planes          Elbow ROM  HEP 30x 30x          Wrist ROM             UT stretch             LS stretch             T/S extension             Table slides Forward x 20 HEP 30x flexion/30x abd           Pulley HEP  5'           Elbow AROM 30x 30x 30x          SL ER  AAROM 2x10  AROM 2x10          SL ABD  AAROM 2x10  AROM 2x10 elbow flexed           Ther Activity                                       Gait Training                                       Modalities             CP   5'                       1:1 with PT from [phone]

## 2022-10-13 ENCOUNTER — OFFICE VISIT (OUTPATIENT)
Dept: PHYSICAL THERAPY | Facility: CLINIC | Age: 52
End: 2022-10-13
Payer: COMMERCIAL

## 2022-10-13 DIAGNOSIS — M25.512 ACUTE PAIN OF LEFT SHOULDER: Primary | ICD-10-CM

## 2022-10-13 DIAGNOSIS — Z47.89 ORTHOPEDIC AFTERCARE: ICD-10-CM

## 2022-10-13 DIAGNOSIS — Z98.890 S/P ARTHROSCOPY OF LEFT SHOULDER: ICD-10-CM

## 2022-10-13 PROCEDURE — 97112 NEUROMUSCULAR REEDUCATION: CPT | Performed by: PHYSICAL THERAPIST

## 2022-10-13 PROCEDURE — 97140 MANUAL THERAPY 1/> REGIONS: CPT | Performed by: PHYSICAL THERAPIST

## 2022-10-13 PROCEDURE — 97110 THERAPEUTIC EXERCISES: CPT | Performed by: PHYSICAL THERAPIST

## 2022-10-13 NOTE — PROGRESS NOTES
Daily Note     Today's date: 10/13/2022  Patient name: Kb Perry  : 1970  MRN: [id number]  Referring provider: Martínez Calle  Dx:   Encounter Diagnosis     ICD-10-CM    1  Acute pain of left shoulder  M25 512    2  S/P arthroscopy of left shoulder  Z98 890    3  Orthopedic aftercare  Z47 89                   Subjective: Pt reports moderate soreness in the shoulder today  Objective: See treatment diary below      Assessment: Continued with POC and increase sets in session without adverse effects  Tolerated treatment well  Good mechanics and control during AROM  Anticipate patient to be able to tolerate light resistance exercises next visit based upon progress and control developed thus far  Patient would benefit from continued PT  Plan: Continue per plan of care        Precautions: A-fib, Labral repair , SVT, s/p L shoulder arthroscopy       Manuals 10/3 10/7 10/11 10/13         PROM of L shoulder nv 8' 8' 8'         Scap mobs L  nv             Rhythmic stabs  gentle ER/IR 2x10 gentle ER/IR 2x10 gentle ER/IR 2x10                      Neuro Re-Ed             Scap retractions 30x 30x  30x         C/s Retractions             Serratus press  AAROM 20x 20x 20x         Prone shoulder Row   1x10 30x         Prone shoulder ext   1x10 30x                                   Ther Ex             Pendulums cw/ccw 30x  30x all planes 30x all planes          Wrist ROM             UT stretch             LS stretch             T/S extension             Table slides Forward x 20 HEP 30x flexion/30x abd           Pulley HEP  5'  5'         Elbow AROM 30x 30x 30x 30x         SL ER  AAROM 2x10  AROM 2x10 AROM 2x10         SL ABD  AAROM 2x10  AROM 2x10 elbow flexed  AROM 2x10 elbow flexed          Ther Activity                                       Gait Training                                       Modalities             CP   5' 5'                      1:1 with PT from

## 2022-10-18 ENCOUNTER — TELEPHONE (OUTPATIENT)
Dept: OBGYN CLINIC | Facility: HOSPITAL | Age: 52
End: 2022-10-18

## 2022-10-18 ENCOUNTER — OFFICE VISIT (OUTPATIENT)
Dept: PHYSICAL THERAPY | Facility: CLINIC | Age: 52
End: 2022-10-18
Payer: COMMERCIAL

## 2022-10-18 DIAGNOSIS — Z47.89 ORTHOPEDIC AFTERCARE: ICD-10-CM

## 2022-10-18 DIAGNOSIS — M25.512 ACUTE PAIN OF LEFT SHOULDER: Primary | ICD-10-CM

## 2022-10-18 DIAGNOSIS — Z98.890 S/P ARTHROSCOPY OF LEFT SHOULDER: ICD-10-CM

## 2022-10-18 PROCEDURE — 97112 NEUROMUSCULAR REEDUCATION: CPT | Performed by: PHYSICAL THERAPIST

## 2022-10-18 PROCEDURE — 97110 THERAPEUTIC EXERCISES: CPT | Performed by: PHYSICAL THERAPIST

## 2022-10-18 PROCEDURE — 97140 MANUAL THERAPY 1/> REGIONS: CPT | Performed by: PHYSICAL THERAPIST

## 2022-10-18 NOTE — TELEPHONE ENCOUNTER
Caller: PCP office, Dr Marlee Chan    Doctor: Gilmer Iyer    Reason for call: Calling because patient asked for referral to vascular surgeon  Told PCP that Dr Gilmer Iyer was referring her to vascular surgery and PCP needed to supply referral  Advised PCP office of message below from nurse  PCP office will advise Dr Marlee Chan of message from our office       Call back#: N/A

## 2022-10-18 NOTE — PROGRESS NOTES
Daily Note     Today's date: 10/18/2022  Patient name: Hema Ortiz  : 1970  MRN: [id number]  Referring provider: Asa Villareal  Dx:   Encounter Diagnosis     ICD-10-CM    1  Acute pain of left shoulder  M25 512    2  S/P arthroscopy of left shoulder  Z98 890    3  Orthopedic aftercare  Z47 89        Start Time:   Stop Time: 0  Total time in clinic (min): 45 minutes    Subjective: Pt arrives today with new complaints  She presented to ED with wrist pain which was the result of a superficial venous thrombosis  Pt is now taking Xarelto which she started 3 days ago  Pt also c/o lower back pain with numbness in her feet  Objective: See treatment diary below      Assessment: Pt 2 5 weeks post op  She has full PROM of the left shoulder  Integrated light resistance into program for left shoulder  Did not perform any strengthening of right UE due to superficial venous thrombosis  Tolerated treatment well  Patient would benefit from continued PT  Plan: Continue per plan of care        Precautions: A-fib, Labral repair , SVT, s/p L shoulder arthroscopy       Manuals 10/3 10/7 10/11 10/13 10/18        PROM of L shoulder nv 8' 8' 8' 8'        Scap mobs L  nv             Rhythmic stabs  gentle ER/IR 2x10 gentle ER/IR 2x10 gentle ER/IR 2x10                      Neuro Re-Ed             Scap retractions 30x 30x  30x         C/s Retractions             Serratus press  AAROM 20x 20x 20x         Prone shoulder Row   1x10 30x 3x10 1#        Prone shoulder ext   1x10 30x 3x10 1#                                  Ther Ex             Pendulums cw/ccw 30x  30x all planes 30x all planes  30x all planes         Wrist ROM             UT stretch             LS stretch             T/S extension             Table slides Forward x 20 HEP 30x flexion/30x abd           Pulley HEP  5'  5'         Elbow AROM 30x 30x 30x 30x         SL ER  AAROM 2x10  AROM 2x10 AROM 2x10 3x10 1#        SL ABD  AAROM 2x10 AROM 2x10 elbow flexed  AROM 2x10 elbow flexed  3x10 1#        Ther Activity                                       Gait Training                                       Modalities             CP   5' 5'                      1:1 with PT from [phone]

## 2022-10-18 NOTE — TELEPHONE ENCOUNTER
Patient called in to let Dr Keily Lobato know that she was diagnosed with a DVT in her R Basilic vein in the arm that her IV was in during surgery  Patient is wondering if you would be willing to give her a referral to vascular surgery for surveillance of the DVt  She is seeing 2 additional areas of swelling in her wrist   She was placed on Xarelto  She is calling PCP as well  Patient is going to [facility] in 5 weeks and she is wondering if she is okay to go per Ortho  I advised she will need to speak to Vascular Dr regarding safety of air travel after DVT  Please advise

## 2022-10-19 NOTE — TELEPHONE ENCOUNTER
Patient given above information and verbalized understanding  She went to ER last night and they are referring her to hem/onc for evaluation  She will reach out to PCP

## 2022-10-19 NOTE — PROGRESS NOTES
PT Evaluation     Today's date: 10/20/2022  Patient name: Lalitha Dorsey  : 1970  MRN: [id number]  Referring provider: Augusto Skinner DO  Dx:   Encounter Diagnosis     ICD-10-CM    1  Acute bilateral low back pain with bilateral sciatica  M54 42     M54 41        Start Time: 0815  Stop Time: 0900  Total time in clinic (min): 45 minutes    Assessment  Assessment details: Lalitha Dorsey is a 47 y o  female who presents with signs and symptoms consistent of acute on chronic LBP with bilateral feet numbness  Pt has been experiencing this pain for ~2 week without a SUSI  Pt does seems to have an extension preference at the lumbar spine as her feet tingling are reduced with repeated extension in stand and lying  Patient presents with B/L lower back pain, decreased lumbar spine ROM, joint mobility restrictions, altered neurodynamics and bilateral radiating LE symptoms into her feet  Due to these impairments, Patient has difficulty performing ambulating, prolonged sitting, prolonged standing, lifting and driving  Patient would benefit from skilled physical therapy to address the impairments, improve their level of function, and to improve their overall quality of life  Primary movement impairments:   1)Lumbar extension preference  2)Hypomobile and painful lumbar joint mobility assessment  3)Positive SLR, Slump, and L femoral nerve tension test    Impairments: abnormal or restricted ROM, activity intolerance, impaired physical strength, lacks appropriate home exercise program and pain with function  Understanding of Dx/Px/POC: good   Prognosis: good    Goals  Short Term Goals: to be achieved by 4 weeks  1) Patient to be independent with basic HEP  2) Decrease pain to 3/10 at its worst   3) Increase Lumbar ROM to full without radiating symptoms      Long Term Goals: to be achieved by discharge  1) FOTO equal to or greater than expected    2) Ambulation to improve to maximal level of function  3) Improve sitting to PLOF without radiating symptoms in LE  Plan  Plan details: Continue shoulder rehabilitation and initiate lumbar spine treatment  Patient would benefit from: PT eval and skilled physical therapy  Planned therapy interventions: joint mobilization, manual therapy, therapeutic activities, therapeutic exercise, patient education and home exercise program  Frequency: 2x per week for 4-6 weeks  Treatment plan discussed with: patient        Subjective Evaluation    History of Present Illness  Mechanism of injury: History of Current Injury: Pt has been attending PT s/p left shoulder arthroscopy and has been c/o of new onset LBP with B/L feet numbness  Pt denies any SUSI of does believe it was from sitting on her couch and being less active from recent surgery  Pt was also diagnoses with superficial blood clots in her R wrist  Pt has had back pain before and it ebbs and flows in the past  Pt does report new onset constant numbness in bilateral feet  Pt fell last January and she believe that her back pain was initiated from this event  Pain location/Descriptors: P1: Bilateral central LBP, left side worse than right  P2: constant plantar and dorsal aspect of the feet (L5 region)   Aggravating factors: Sitting, driving, lifting  Easing factors: Meds and biofreeze  24 HR pattern: movement dependent and increases with sitting  Imaging: XR of L/S  Special Questions: + for N/T, Kraig Arvin denies a new onset of Bladder incontinence, Bowel dysfunction, Recent unexplained weight loss, Clumsy or unsteadiness, Constant night pain, Tingling, Numbness, Saddle anesthesia , Recent infection and Immunosuppression      Patient goals:  Return to PLOF  Hobbies/Interest: Exercise  Occupation: Sales    Pain  Current pain ratin  At best pain ratin  At worst pain ratin      Diagnostic Tests  X-ray: normal (Soliosis and facet issue)  Treatments  Previous treatment: medication  Patient Goals  Patient goals for therapy: increased strength, decreased pain, increased motion and independence with ADLs/IADLs          Objective     Neurological Testing     Sensation     Lumbar   Left   Intact: light touch    Right   Diminished: light touch    Comments   Right light touch: L4-L5     Reflexes   Left   Patellar (L4): brisk (3+)  Achilles (S1): brisk (3+)  Babinski sign: negative  Clonus sign: negative    Right   Patellar (L4): brisk (3+)  Achilles (S1): brisk (3+)  Babinski sign: negative  Clonus sign: negative    Active Range of Motion     Lumbar   Flexion:  WFL  Extension:  WFL  Left lateral flexion:  with pain Restriction level: moderate  Right lateral flexion:  with pain Restriction level: moderate  Left rotation:  with pain Restriction level: moderate  Right rotation:  [facility]    Additional Active Range of Motion Details  RFIS: This causes neural tension into LE and B/L feet numbness  JAYNE: decreases B/L feet numbness  *positive quadrant B/L  *Side glide (reduces pain)     Strength/Myotome Testing     Lumbar   Left   Heel walk: normal  Toe walk: normal    Right   Heel walk: normal  Toe walk: normal    Left Hip   Planes of Motion   Flexion: 5  Abduction: 5  Adduction: 5    Right Hip   Planes of Motion   Flexion: 5  Abduction: 5  Adduction: 5    Left Knee   Flexion: 5  Extension: 5    Right Knee   Flexion: 5  Extension: 5    Left Ankle/Foot   Dorsiflexion: 5  Plantar flexion: 5    Right Ankle/Foot   Dorsiflexion: 5  Plantar flexion: 5    Tests     Lumbar     Left   Positive femoral stretch, quadrant and slump test      Right   Positive quadrant and slump test    Negative femoral stretch  Left Hip   Negative DEMETRIO and FADIR  Right Hip   Positive DEMETRIO  Additional Tests Details  *Slump+ B/L at make knee flexion and DF of ankle  *SLR at 45 deg B/L for neural tension and foot tingling  *Positive DEMETRIO on right R  *Positive neural tension on the L during Femoral nerve                Precautions: A-fib, Labral repair 2009, SVT, s/p L shoulder arthroscopy 9/30    Manuals             CPA L1-5              UPA                                        Neuro Re-Ed             Nerve glide- Sciatic             Bridge              Femoral N glide             TrA c/ PB                                                    Ther Ex             Piriformis stretch             Prone press up             Side glide                                                                               Ther Activity                                       Gait Training                                       Modalities                                         [email or web address]  Access Code: [email or web address]

## 2022-10-19 NOTE — TELEPHONE ENCOUNTER
She should see PCP first and they would refer to vascular if they deem appropriate  Question about flying should be deferred to the person monitoring her DVT

## 2022-10-19 NOTE — TELEPHONE ENCOUNTER
Thanks [employer]    Has follow up with us next week so we can check her shoulder progress

## 2022-10-20 ENCOUNTER — OFFICE VISIT (OUTPATIENT)
Dept: PHYSICAL THERAPY | Facility: CLINIC | Age: 52
End: 2022-10-20
Payer: COMMERCIAL

## 2022-10-20 ENCOUNTER — CONSULT (OUTPATIENT)
Dept: HEMATOLOGY ONCOLOGY | Facility: CLINIC | Age: 52
End: 2022-10-20
Payer: COMMERCIAL

## 2022-10-20 VITALS
HEIGHT: 66 IN | OXYGEN SATURATION: 98 % | SYSTOLIC BLOOD PRESSURE: 126 MMHG | BODY MASS INDEX: 24.27 KG/M2 | TEMPERATURE: 99.7 F | RESPIRATION RATE: 16 BRPM | HEART RATE: 73 BPM | WEIGHT: 151 LBS | DIASTOLIC BLOOD PRESSURE: 76 MMHG

## 2022-10-20 DIAGNOSIS — M54.41 ACUTE BILATERAL LOW BACK PAIN WITH BILATERAL SCIATICA: Primary | ICD-10-CM

## 2022-10-20 DIAGNOSIS — I82.611 SUPERFICIAL VENOUS THROMBOSIS OF ARM, RIGHT: Primary | ICD-10-CM

## 2022-10-20 DIAGNOSIS — M54.42 ACUTE BILATERAL LOW BACK PAIN WITH BILATERAL SCIATICA: Primary | ICD-10-CM

## 2022-10-20 PROCEDURE — 97110 THERAPEUTIC EXERCISES: CPT | Performed by: PHYSICAL THERAPIST

## 2022-10-20 PROCEDURE — 99244 OFF/OP CNSLTJ NEW/EST MOD 40: CPT | Performed by: PHYSICIAN ASSISTANT

## 2022-10-20 PROCEDURE — 97162 PT EVAL MOD COMPLEX 30 MIN: CPT | Performed by: PHYSICAL THERAPIST

## 2022-10-20 RX ORDER — RIVAROXABAN 15 MG-20MG
KIT ORAL
COMMUNITY
Start: 2022-10-15 | End: 2022-11-14

## 2022-10-20 RX ORDER — RIVAROXABAN 20 MG/1
TABLET, FILM COATED ORAL
COMMUNITY
Start: 2022-10-18

## 2022-10-20 NOTE — LETTER
2022    [facility]    Patient: Rebeka Manuel   YOB: 1970   Date of Visit: 10/20/2022     Encounter Diagnosis     ICD-10-CM    1  Acute bilateral low back pain with bilateral sciatica  M54 42     M54 41        Dear Dr Long Gresham: Thank you for your recent referral of Rebeka Manuel  Please review the attached evaluation summary from Milly's recent visit  Please verify that you agree with the plan of care by signing the attached order  If you have any questions or concerns, please do not hesitate to call  I sincerely appreciate the opportunity to share in the care of one of your patients and hope to have another opportunity to work with you in the near future  Sincerely,    Elizabeth Osullivan, PT      Referring Provider:      I certify that I have read the below Plan of Care and certify the need for these services furnished under this plan of treatment while under my care  Diana Carrillo DO  [address] [facility] [place]  [address] [place] [address]  Via Fax: [phone]          PT Evaluation     Today's date: 10/20/2022  Patient name: Rebeka Manuel  : 1970  MRN: [id number]  Referring provider: Lana Ruiz DO  Dx:   Encounter Diagnosis     ICD-10-CM    1  Acute bilateral low back pain with bilateral sciatica  M54 42     M54 41        Start Time: 0815  Stop Time: 0900  Total time in clinic (min): 45 minutes    Assessment  Assessment details: Rebeka Manuel is a 47 y o  female who presents with signs and symptoms consistent of acute on chronic LBP with bilateral feet numbness  Pt has been experiencing this pain for ~2 week without a SUSI  Pt does seems to have an extension preference at the lumbar spine as her feet tingling are reduced with repeated extension in stand and lying   Patient presents with B/L lower back pain, decreased lumbar spine ROM, joint mobility restrictions, altered neurodynamics and bilateral radiating LE symptoms into her feet  Due to these impairments, Patient has difficulty performing ambulating, prolonged sitting, prolonged standing, lifting and driving  Patient would benefit from skilled physical therapy to address the impairments, improve their level of function, and to improve their overall quality of life  Primary movement impairments:   1)Lumbar extension preference  2)Hypomobile and painful lumbar joint mobility assessment  3)Positive SLR, Slump, and L femoral nerve tension test    Impairments: abnormal or restricted ROM, activity intolerance, impaired physical strength, lacks appropriate home exercise program and pain with function  Understanding of Dx/Px/POC: good   Prognosis: good    Goals  Short Term Goals: to be achieved by 4 weeks  1) Patient to be independent with basic HEP  2) Decrease pain to 3/10 at its worst   3) Increase Lumbar ROM to full without radiating symptoms      Long Term Goals: to be achieved by discharge  1) FOTO equal to or greater than expected  2) Ambulation to improve to maximal level of function  3) Improve sitting to PLOF without radiating symptoms in LE  Plan  Plan details: Continue shoulder rehabilitation and initiate lumbar spine treatment  Patient would benefit from: PT eval and skilled physical therapy  Planned therapy interventions: joint mobilization, manual therapy, therapeutic activities, therapeutic exercise, patient education and home exercise program  Frequency: 2x per week for 4-6 weeks  Treatment plan discussed with: patient        Subjective Evaluation    History of Present Illness  Mechanism of injury: History of Current Injury: Pt has been attending PT s/p left shoulder arthroscopy and has been c/o of new onset LBP with B/L feet numbness  Pt denies any SUSI of does believe it was from sitting on her couch and being less active from recent surgery   Pt was also diagnoses with superficial blood clots in her R wrist  Pt has had back pain before and it ebbs and flows in the past  Pt does report new onset constant numbness in bilateral feet  Pt fell last January and she believe that her back pain was initiated from this event  Pain location/Descriptors: P1: Bilateral central LBP, left side worse than right  P2: constant plantar and dorsal aspect of the feet (L5 region)   Aggravating factors: Sitting, driving, lifting  Easing factors: Meds and biofreeze  24 HR pattern: movement dependent and increases with sitting  Imaging: XR of L/S  Special Questions: + for N/T, Oscar Ashby denies a new onset of Bladder incontinence, Bowel dysfunction, Recent unexplained weight loss, Clumsy or unsteadiness, Constant night pain, Tingling, Numbness, Saddle anesthesia , Recent infection and Immunosuppression      Patient goals:  Return to PLOF  Hobbies/Interest: Exercise  Occupation: [employer]    Pain  Current pain ratin  At best pain ratin  At worst pain ratin      Diagnostic Tests  X-ray: normal (Soliosis and facet issue)  Treatments  Previous treatment: medication  Patient Goals  Patient goals for therapy: increased strength, decreased pain, increased motion and independence with ADLs/IADLs          Objective     Neurological Testing     Sensation     Lumbar   Left   Intact: light touch    Right   Diminished: light touch    Comments   Right light touch: L4-L5     Reflexes   Left   Patellar (L4): brisk (3+)  Achilles (S1): brisk (3+)  Babinski sign: negative  Clonus sign: negative    Right   Patellar (L4): brisk (3+)  Achilles (S1): brisk (3+)  Babinski sign: negative  Clonus sign: negative    Active Range of Motion     Lumbar   Flexion:  WFL  Extension:  WFL  Left lateral flexion:  with pain Restriction level: moderate  Right lateral flexion:  with pain Restriction level: moderate  Left rotation:  with pain Restriction level: moderate  Right rotation:  [facility]    Additional Active Range of Motion Details  RFIS: This causes neural tension into LE and B/L feet numbness  JAYNE: decreases B/L feet numbness  *positive quadrant B/L  *Side glide (reduces pain)     Strength/Myotome Testing     Lumbar   Left   Heel walk: normal  Toe walk: normal    Right   Heel walk: normal  Toe walk: normal    Left Hip   Planes of Motion   Flexion: 5  Abduction: 5  Adduction: 5    Right Hip   Planes of Motion   Flexion: 5  Abduction: 5  Adduction: 5    Left Knee   Flexion: 5  Extension: 5    Right Knee   Flexion: 5  Extension: 5    Left Ankle/Foot   Dorsiflexion: 5  Plantar flexion: 5    Right Ankle/Foot   Dorsiflexion: 5  Plantar flexion: 5    Tests     Lumbar     Left   Positive femoral stretch, quadrant and slump test      Right   Positive quadrant and slump test    Negative femoral stretch  Left Hip   Negative DEMETRIO and FADIR  Right Hip   Positive DEMETRIO  Additional Tests Details  *Slump+ B/L at make knee flexion and DF of ankle  *SLR at 45 deg B/L for neural tension and foot tingling  *Positive DEMETRIO on right R  *Positive neural tension on the L during Femoral nerve  Precautions: A-fib, Labral repair 2009, SVT, s/p L shoulder arthroscopy 9/30    Manuals             CPA L1-5              UPA                                        Neuro Re-Ed             Nerve glide- Sciatic             Bridge              Femoral N glide             TrA c/ PB                                                    Ther Ex             Piriformis stretch             Prone press up             Side glide                                                                               Ther Activity                                       Gait Training                                       Modalities                                         [facility]  Access Code: [email or web address]

## 2022-10-20 NOTE — PROGRESS NOTES
Hematology/Oncology Outpatient Consult  José Graham 47 y o  female 1970 [id number]    Date:  10/21/2022      Assessment and Plan:  1  Superficial venous thrombosis of arm, right  Reviewed with patient this was provoked from IV insertion in the RUE at time of surgery for the left shoulder  Typically treatment of SVT is NSAIDs and warm compress  ED provider at [facility] started her on Xarelto starter pack on 10/15/22, assumably due to occlusive thrombus present though still SVT  Advised this is still OK to complete  She has has improvement in pain and improvement in the hardening feeling of the veins  She is travelling to [facility] end of Nov  She will have enough Xarelto to take through then she states  Advised she can continue through then, repeat doppler after she returns, then will likely be able to d/c  She stated she was told she would need to take Xarelto for 6 months, reviewed at most she would take for 3 months  Reviewed avoidance of NSAIDs while on Xarelto  Any injury to the head/trunk, needs to be evaluated in the ED  advised saline nasal sprays  Follow up after doppler      - VAS lower limb venous duplex study, unilateral/limited; Future      HPI:  47year old female presents for consult for superficial thrombophlebitis  Patient was in the OR with Dr Remy Lopez on 9/30/22 for an arthroscopic shoulder repair, left shoulder  She was seen in the ED on 10/15/22 at [facility]  She was dx with occlusive thrombus in the basilic vein of the right forearm extending into the hand in the superficial venous systm  No evidence of DVT in the arm  She states she was having symptoms in the right arm for 2 weeks before going to the ED  She states her IV was placed in the right arm for her surgery  She was discharged from ED on Xarelto  She came back to the ED on 10/19/22 at [facility] due to continued pain the in the arm  No changes, was advised to continue on Xarelto       She is UTD with mammogram, colonoscopy, and gyn exam  She also sees derm once a year  ROS: Review of Systems   Constitutional: Negative for appetite change, chills, fatigue, fever and unexpected weight change  HENT: Negative for nosebleeds  Respiratory: Negative for cough and shortness of breath  Cardiovascular: Negative for chest pain, palpitations and leg swelling  Gastrointestinal: Negative for abdominal pain, constipation, diarrhea, nausea and vomiting  Genitourinary: Negative for difficulty urinating, dysuria and hematuria  Musculoskeletal: Negative for arthralgias  Skin: Negative  Neurological: Negative for weakness and numbness  Hematological: Negative  Psychiatric/Behavioral: Negative          Past Medical History:   Diagnosis Date   • Anxiety     During Menopause   • Atrial fibrillation (HCC)    • Bradycardia    • COVID 06/2022   • Elevated liver enzymes    • GERD (gastroesophageal reflux disease)    • Hyperlipidemia    • Hypotension    • Irregular heart beat    • Migraines    • Pneumonia     age 4   • PONV (postoperative nausea and vomiting)    • SVT (supraventricular tachycardia) (HCC)        Past Surgical History:   Procedure Laterality Date   • AUGMENTATION BREAST     • CARDIOVERSION  2020   • CHOLECYSTECTOMY     • COLONOSCOPY     • KNEE ARTHROSCOPY Right    • LAPAROSCOPIC ENDOMETRIOSIS FULGURATION     • RI ARTHROSCOPY SHOULDER SURGICAL BICEPS TENODESIS Right 1/15/2021    Procedure: SHOULDER ARTHROSCOPY WITH ARTHROSCOPIC BICEPS TENODESIS, ROTATOR CUFF REPAIR; POSTERIOR LABRAL REPAIR, SAD;  Surgeon: Guanaco Mitchell MD;  Location: [facility];  Service: Orthopedics   • RI SHLDR ARTHROSCOP,DIAGNOSTIC Left 9/30/2022    Procedure: SHOULDER ARTHROSCOPY EXTENSIVE DEBRIDEMENT;  Surgeon: Yaritza Rand MD;  Location: [facility];  Service: Orthopedics   • SHOULDER ARTHROSCOPY W/ LABRAL REPAIR Bilateral    • SHOULDER SURGERY Right    • TONSILLECTOMY AND ADENOIDECTOMY     • UPPER GASTROINTESTINAL ENDOSCOPY         Social History     Socioeconomic History   • Marital status:      Spouse name: Not on file   • Number of children: Not on file   • Years of education: Not on file   • Highest education level: Not on file   Occupational History   • Not on file   Tobacco Use   • Smoking status: Never Smoker   • Smokeless tobacco: Never Used   Vaping Use   • Vaping Use: Never used   Substance and Sexual Activity   • Alcohol use: Not Currently   • Drug use: Never   • Sexual activity: Not on file   Other Topics Concern   • Not on file   Social History Narrative   • Not on file     Social Determinants of Health     Financial Resource Strain: Not on file   Food Insecurity: Not on file   Transportation Needs: Not on file   Physical Activity: Not on file   Stress: Not on file   Social Connections: Not on file   Intimate Partner Violence: Not on file   Housing Stability: Not on file       Family History   Problem Relation Age of Onset   • Hypertension Mother    • Glaucoma Mother    • Glaucoma Father    • Hypertension Father        Allergies   Allergen Reactions   • Prednisone Other (See Comments)     "AFIB"   • Macrobid [Nitrofurantoin] Hives   • Other Hives     "Cold Temperatures"   • Reglan [Metoclopramide] Syncope   • Influenza Vaccines Abdominal Pain   • Buspirone Other (See Comments)     Increased her anxiety   • Codeine Other (See Comments) and Vomiting     Severe shaking   • Compazine [Prochlorperazine] Anxiety and Vomiting   • Darvon [Propoxyphene] Other (See Comments) and Vomiting     Severe Shaking   • Demerol [Meperidine] Other (See Comments) and Vomiting     Severe Shaking   • Morphine Other (See Comments) and Vomiting     Severe Shaking   • Percocet [Oxycodone-Acetaminophen] Palpitations     shakiness           Current Outpatient Medications:   •  Ascorbic Acid (VITAMIN C PO), Take by mouth in the morning, Disp: , Rfl:   •  atenolol (TENORMIN) 25 mg tablet, Take 12 5 mg by mouth daily at bedtime , Disp: , Rfl:   •  clonazePAM (KlonoPIN) 0 5 mg tablet, Take 0 5 mg by mouth as needed Takes for Migraines, Disp: , Rfl:   •  ELDERBERRY PO, Take by mouth daily, Disp: , Rfl:   •  Lumigan 0 01 % ophthalmic drops, , Disp: , Rfl:   •  multivitamin (THERAGRAN) TABS, Take 1 tablet by mouth daily, Disp: , Rfl:   •  ondansetron (ZOFRAN) 4 mg tablet, Take 1 tablet (4 mg total) by mouth every 8 (eight) hours as needed for nausea or vomiting, Disp: 20 tablet, Rfl: 0  •  rivaroxaban (Xarelto Starter Pack) 15 & 20 MG starter pack, Take by mouth, Disp: , Rfl:   •  CALCIUM PO, Take by mouth daily (Patient not taking: Reported on 10/20/2022), Disp: , Rfl:   •  estradiol (ESTRACE) 0 1 mg/g vaginal cream, 0 5 g vaginally twice a week and apply sparingly to vulva daily (Patient not taking: Reported on 10/20/2022), Disp: , Rfl:   •  HYDROcodone-acetaminophen (Norco) 5-325 mg per tablet, Take 1 tablet by mouth every 6 (six) hours as needed (severe shoulder pain only) for up to 12 doses Max Daily Amount: 4 tablets (Patient not taking: Reported on 10/20/2022), Disp: 12 tablet, Rfl: 0  •  pantoprazole (PROTONIX) 40 mg tablet, Take 1 tablet (40 mg total) by mouth daily in the early morning, Disp: 90 tablet, Rfl: 3  •  VITAMIN D, CHOLECALCIFEROL, PO, Take by mouth in the morning (Patient not taking: Reported on 10/20/2022), Disp: , Rfl:   •  Xarelto 20 MG tablet, , Disp: , Rfl:       Physical Exam:  /76 (BP Location: Left arm, Patient Position: Sitting, Cuff Size: Adult)   Pulse 73   Temp 99 7 °F (37 6 °C) (Temporal)   Resp 16   Ht 5' 6" (1 676 m)   Wt 68 5 kg (151 lb)   SpO2 98%   BMI 24 37 kg/m²     Physical Exam  Vitals reviewed  Constitutional:       General: She is not in acute distress  Appearance: She is well-developed  She is not ill-appearing  HENT:      Head: Normocephalic and atraumatic  Eyes:      General: No scleral icterus       Conjunctiva/sclera: Conjunctivae normal    Cardiovascular: Rate and Rhythm: Normal rate and regular rhythm  Heart sounds: Normal heart sounds  No murmur heard  Pulmonary:      Effort: Pulmonary effort is normal  No respiratory distress  Breath sounds: Normal breath sounds  Abdominal:      Palpations: Abdomen is soft  Tenderness: There is no abdominal tenderness  Musculoskeletal:         General: No tenderness  Normal range of motion  Cervical back: Normal range of motion and neck supple  Right lower leg: No edema  Left lower leg: No edema  Lymphadenopathy:      Cervical: No cervical adenopathy  Skin:     General: Skin is warm and dry  Comments: Palpable small areas of hardened superficial vein on the right arm, medial, superior to wrist    Neurological:      Mental Status: She is alert and oriented to person, place, and time  Cranial Nerves: No cranial nerve deficit  Psychiatric:         Mood and Affect: Mood normal          Behavior: Behavior normal        Patient voiced understanding and agreement in the above discussion  Aware to contact our office with questions/symptoms in the interim  This note has been generated by voice recognition software system  Therefore, there may be spelling, grammar, and or syntax errors  Please contact if questions arise

## 2022-10-24 ENCOUNTER — OFFICE VISIT (OUTPATIENT)
Dept: OBGYN CLINIC | Facility: OTHER | Age: 52
End: 2022-10-24

## 2022-10-24 VITALS
HEIGHT: 66 IN | HEART RATE: 64 BPM | DIASTOLIC BLOOD PRESSURE: 83 MMHG | BODY MASS INDEX: 24.27 KG/M2 | SYSTOLIC BLOOD PRESSURE: 133 MMHG | WEIGHT: 151 LBS

## 2022-10-24 DIAGNOSIS — Z47.89 AFTERCARE FOLLOWING SURGERY OF THE MUSCULOSKELETAL SYSTEM: Primary | ICD-10-CM

## 2022-10-24 PROCEDURE — 99024 POSTOP FOLLOW-UP VISIT: CPT | Performed by: PHYSICIAN ASSISTANT

## 2022-10-24 RX ORDER — CYCLOSPORINE 0.5 MG/ML
EMULSION OPHTHALMIC
COMMUNITY
Start: 2022-10-22

## 2022-10-24 NOTE — PROGRESS NOTES
Assessment:       1  Aftercare following surgery of the musculoskeletal system          Plan:        Patient is doing well postoperatively in improving with PT  All questions were addressed to the patient's satisfaction  Patient will continue rehab protocol with PT  Follow-up will be in 2 months to assess patient's progress  On a separate note, she is currently under treatment with blood thinners for a superficial vein thrombosis in her right upper extremity  He is advised to follow-up with her PCP and that he may on specialist involved in her care  Subjective:     Patient ID: Malathi Barry is a 47 y o  female  Chief Complaint:    HPI    Patient presents to the office for follow-up status post left shoulder arthroscopy with extensive debridement on 09/30/2022  She is making progress with PT  She has no new concerns regarding her left shoulder  Social History     Occupational History   • Not on file   Tobacco Use   • Smoking status: Never Smoker   • Smokeless tobacco: Never Used   Vaping Use   • Vaping Use: Never used   Substance and Sexual Activity   • Alcohol use: Not Currently   • Drug use: Never   • Sexual activity: Not on file      Review of Systems   Constitutional: Negative  Respiratory: Negative  Cardiovascular: Negative  Gastrointestinal: Negative  Genitourinary: Negative  Musculoskeletal: Positive for myalgias  Negative for arthralgias  Skin: Negative for wound  Neurological: Positive for weakness  Negative for numbness  Objective:     Ortho ExamPhysical Exam  HENT:      Head: Atraumatic  Cardiovascular:      Pulses: Normal pulses  Pulmonary:      Effort: Pulmonary effort is normal    Musculoskeletal:      Comments: Left shoulder Active range of motion: Forward flexion 150°, external rotation 90°  Skin:     General: Skin is warm and dry  Capillary Refill: Capillary refill takes less than 2 seconds        Comments: Left shoulder Surgical incisions dry and clean, healed  Neurological:      Mental Status: She is alert and oriented to person, place, and time  Sensory: No sensory deficit     Psychiatric:         Mood and Affect: Mood normal          Behavior: Behavior normal

## 2022-10-25 ENCOUNTER — OFFICE VISIT (OUTPATIENT)
Dept: PHYSICAL THERAPY | Facility: CLINIC | Age: 52
End: 2022-10-25
Payer: COMMERCIAL

## 2022-10-25 DIAGNOSIS — M54.41 ACUTE BILATERAL LOW BACK PAIN WITH BILATERAL SCIATICA: Primary | ICD-10-CM

## 2022-10-25 DIAGNOSIS — M25.512 ACUTE PAIN OF LEFT SHOULDER: ICD-10-CM

## 2022-10-25 DIAGNOSIS — M54.42 ACUTE BILATERAL LOW BACK PAIN WITH BILATERAL SCIATICA: Primary | ICD-10-CM

## 2022-10-25 DIAGNOSIS — Z47.89 ORTHOPEDIC AFTERCARE: ICD-10-CM

## 2022-10-25 DIAGNOSIS — Z98.890 S/P ARTHROSCOPY OF LEFT SHOULDER: ICD-10-CM

## 2022-10-25 PROCEDURE — 97140 MANUAL THERAPY 1/> REGIONS: CPT | Performed by: PHYSICAL THERAPIST

## 2022-10-25 PROCEDURE — 97110 THERAPEUTIC EXERCISES: CPT | Performed by: PHYSICAL THERAPIST

## 2022-10-25 PROCEDURE — 97112 NEUROMUSCULAR REEDUCATION: CPT | Performed by: PHYSICAL THERAPIST

## 2022-10-25 NOTE — PROGRESS NOTES
Daily Note     Today's date: 10/25/2022  Patient name: Violet Rajan  : 1970  MRN: [id number]  Referring provider: Tim Lanier  Dx:   Encounter Diagnosis     ICD-10-CM    1  Acute bilateral low back pain with bilateral sciatica  M54 42     M54 41    2  Acute pain of left shoulder  M25 512    3  S/P arthroscopy of left shoulder  [id number]        Start Time: 1745  Stop Time: 1830  Total time in clinic (min): 45 minutes    Subjective: Pt followed up with Hem/onc and Dr William Ramirez visit since last PT session  Pt will be on anti-coagulation therapy for 3 months  Pt does reports numbness in bilateral feet today that worsened with sitting while on her computer working  Objective: See treatment diary below      Assessment: Implemented treatment for the lumbar spine  Pt is nearly 4 weeks s/p left shoulder arthroscopy  Progressed resistance to 2lbs with fatigue but minimal symptoms  Tolerated treatment well  Patient would benefit from continued PT      Plan: Continue per plan of care        Precautions: A-fib, Labral repair , SVT, s/p L shoulder arthroscopy     Manuals 10/3 10/7 10/11 10/13 10/18 10/25       PROM of L shoulder nv 8' 8' 8' 8' 4'       Scap mobs L  nv             Rhythmic stabs  gentle ER/IR 2x10 gentle ER/IR 2x10 gentle ER/IR 2x10  gentle ER/IR 2x10                    Neuro Re-Ed             Scap retractions 30x 30x  30x         C/s Retractions             Serratus press  AAROM 20x 20x 20x         Prone shoulder Row   1x10 30x 3x10 1# 3x10 2#       Prone shoulder ext   1x10 30x 3x10 1# 3x10 1#                                 Ther Ex             Pendulums cw/ccw 30x  30x all planes 30x all planes  30x all planes         Wrist ROM             UT stretch             LS stretch             T/S extension             Table slides Forward x 20 HEP 30x flexion/30x abd           Pulley HEP  5'  5'         Elbow AROM 30x 30x 30x 30x         SL ER  AAROM 2x10  AROM 2x10 AROM 2x10 3x10 1# 2x10 2#       SL ABD  AAROM 2x10  AROM 2x10 elbow flexed  AROM 2x10 elbow flexed  3x10 1#        Bicep curl       3x10 2#       Ther Activity                                       Gait Training                                       Modalities             CP   5' 5'                          Manuals 10/25            CPA L1-5  Gr III 4'            UPA  Gr III 4'                                      Neuro Re-Ed             Nerve glide- Sciatic             Bridge              Femoral N glide 10x10"            TrA c/ PB                                                    Ther Ex             Piriformis stretch             Prone press up 10x10"            Side glide              Elliptical 8'                                                                Ther Activity                                       Gait Training                                       Modalities                                       1:1 with PT from 545-630pm

## 2022-10-27 ENCOUNTER — OFFICE VISIT (OUTPATIENT)
Dept: PHYSICAL THERAPY | Facility: CLINIC | Age: 52
End: 2022-10-27
Payer: COMMERCIAL

## 2022-10-27 DIAGNOSIS — Z47.89 ORTHOPEDIC AFTERCARE: ICD-10-CM

## 2022-10-27 DIAGNOSIS — M25.512 ACUTE PAIN OF LEFT SHOULDER: ICD-10-CM

## 2022-10-27 DIAGNOSIS — M54.42 ACUTE BILATERAL LOW BACK PAIN WITH BILATERAL SCIATICA: Primary | ICD-10-CM

## 2022-10-27 DIAGNOSIS — M54.41 ACUTE BILATERAL LOW BACK PAIN WITH BILATERAL SCIATICA: Primary | ICD-10-CM

## 2022-10-27 DIAGNOSIS — Z98.890 S/P ARTHROSCOPY OF LEFT SHOULDER: ICD-10-CM

## 2022-10-27 PROCEDURE — 97140 MANUAL THERAPY 1/> REGIONS: CPT

## 2022-10-27 PROCEDURE — 97112 NEUROMUSCULAR REEDUCATION: CPT

## 2022-10-27 PROCEDURE — 97110 THERAPEUTIC EXERCISES: CPT

## 2022-10-27 NOTE — PROGRESS NOTES
Daily Note     Today's date: 10/27/2022  Patient name: Romeo Tom  : 1970  MRN: [id number]  Referring provider: Rosita Waters  Dx:   Encounter Diagnosis     ICD-10-CM    1  Acute bilateral low back pain with bilateral sciatica  M54 42     M54 41    2  Acute pain of left shoulder  M25 512    3  S/P arthroscopy of left shoulder  Z98 890    4  Orthopedic aftercare  Z47 89        Start Time:   Stop Time:   Total time in clinic (min): 40 minutes    Subjective: Pt states she is still sore from progression LV  Objective: See treatment diary below      Assessment: Patient tolerated treatment fair  Moderate pain noted at end range in all planes during PROM  Some weights modifies this visit 2* to increased pain  UT stretch added this visit 2* UT restrictions noted by patient  Patient advised to hold on weighted TE until NV  Patient would benefit from continued PT      Plan: Continue per plan of care        Precautions: A-fib, Labral repair , SVT, s/p L shoulder arthroscopy , Blood clot (R) UE from IV, pt on blood thinners 10/24/22 Dr cleared pt for PT    Manuals 10/3 10/7 10/11 10/13 10/18 10/25 10/27      PROM of L shoulder nv 8' 8' 8' 8' 4' 8' gentle      Scap mobs L  nv             Rhythmic stabs  gentle ER/IR 2x10 gentle ER/IR 2x10 gentle ER/IR 2x10  gentle ER/IR 2x10 gentle ER/IR 2x10                   Neuro Re-Ed             Scap retractions 30x 30x  30x         C/s Retractions             Serratus press  AAROM 20x 20x 20x         Prone shoulder Row   1x10 30x 3x10 1# 3x10 2# 2x10  #1      Prone shoulder ext   1x10 30x 3x10 1# 3x10 1# 2x10 1#                                Ther Ex             Pendulums cw/ccw 30x  30x all planes 30x all planes  30x all planes         Wrist ROM             UT stretch       10x10"ea      LS stretch             T/S extension             Table slides Forward x 20 HEP 30x flexion/30x abd           [place] HEP  5'  5'         Elbow AROM 30x 30x 30x 30x SL ER  AAROM 2x10  AROM 2x10 AROM 2x10 3x10 1# 2x10 2# 2x10 1#      SL ABD  AAROM 2x10  AROM 2x10 elbow flexed  AROM 2x10 elbow flexed  3x10 1#        Bicep curl       3x10 2# 3x10 2#      Ther Activity                                       Gait Training                                       Modalities             CP   5' 5'                          Manuals 10/25 10/27           CPA L1-5  Gr III 4' NV           UPA  Gr III 4' NV                                     Neuro Re-Ed             Nerve glide- Sciatic             Bridge              Femoral N glide 10x10" NV           TrA c/ PB                                                    Ther Ex             Piriformis stretch             Prone press up 10x10" NV           Side glide              Elliptical 8' NV                                                               Ther Activity                                       Gait Training                                       Modalities                                       1:1 with PT from 545-630pm

## 2022-11-01 ENCOUNTER — OFFICE VISIT (OUTPATIENT)
Dept: PHYSICAL THERAPY | Facility: CLINIC | Age: 52
End: 2022-11-01

## 2022-11-01 DIAGNOSIS — Z98.890 S/P ARTHROSCOPY OF LEFT SHOULDER: ICD-10-CM

## 2022-11-01 DIAGNOSIS — M54.42 ACUTE BILATERAL LOW BACK PAIN WITH BILATERAL SCIATICA: Primary | ICD-10-CM

## 2022-11-01 DIAGNOSIS — M54.41 ACUTE BILATERAL LOW BACK PAIN WITH BILATERAL SCIATICA: Primary | ICD-10-CM

## 2022-11-01 DIAGNOSIS — Z47.89 ORTHOPEDIC AFTERCARE: ICD-10-CM

## 2022-11-01 DIAGNOSIS — M25.512 ACUTE PAIN OF LEFT SHOULDER: ICD-10-CM

## 2022-11-01 NOTE — PROGRESS NOTES
Daily Note     Today's date: 2022  Patient name: Melissa Patino  : 1970  MRN: [id number]  Referring provider: Yvette Gardiner  Dx:   Encounter Diagnosis     ICD-10-CM    1  Acute bilateral low back pain with bilateral sciatica  M54 42     M54 41    2  Acute pain of left shoulder  M25 512    3  S/P arthroscopy of left shoulder  Z98 890    4  Orthopedic aftercare  Z47 89        Start Time: 174  Stop Time: 1830  Total time in clinic (min): 45 minutes    Subjective: Pt strained her right hip flexor last week after slipping  She had difficulty lifting right left after this event  Overall, this is feeling better  Objective: See treatment diary below      Assessment: Tolerated treatment well  Progressed L/S strength program  Patient still fairly tender and hypomobile at lower lumbar spine  Pt experienced moderated tone in left UT likely due to compensatory motion  STM performed to this region  Patient would benefit from continued PT  Plan: Continue per plan of care        Precautions: A-fib, Labral repair , SVT, s/p L shoulder arthroscopy , Blood clot (R) UE from IV, pt on blood thinners 10/24/22 Dr cleared pt for PT    Manuals 10/3 10/7 10/11 10/13 10/18 10/25 10/27 11/1     PROM of L shoulder nv 8' 8' 8' 8' 4' 8' gentle 4'     Scap mobs L  nv             Rhythmic stabs  gentle ER/IR 2x10 gentle ER/IR 2x10 gentle ER/IR 2x10  gentle ER/IR 2x10 gentle ER/IR 2x10      L UT STM         3'     Neuro Re-Ed             Scap retractions 30x 30x  30x         C/s Retractions             Serratus press  AAROM 20x 20x 20x         Prone shoulder Row   1x10 30x 3x10 1# 3x10 2# 2x10  #1 btb 3x10      Prone shoulder ext   1x10 30x 3x10 1# 3x10 1# 2x10 1# btb 3x10      TB ER        3x10 rtb     TB IR        3x10 rtb      Inclined bench T        nv      Ther Ex             Pendulums cw/ccw 30x  30x all planes 30x all planes  30x all planes         Wrist ROM             UT stretch       10x10"ea LS stretch             T/S extension             Table slides Forward x 20 HEP 30x flexion/30x abd           [place] HEP  5'  5'         Elbow AROM 30x 30x 30x 30x         SL ER  AAROM 2x10  AROM 2x10 AROM 2x10 3x10 1# 2x10 2# 2x10 1#      SL ABD  AAROM 2x10  AROM 2x10 elbow flexed  AROM 2x10 elbow flexed  3x10 1#        Bicep curl       3x10 2# 3x10 2# 3x10 4#     [place] Triceps extension        3x15 12# c/ rope     Ther Activity                                       Gait Training                                       Modalities             CP   5' 5'                          Manuals 10/25 10/27 11/1          CPA L1-5  Gr III 4' NV Gr III 4'          UPA  Gr III 4' NV Gr III 4'           T/S prone Gr V   performed                        Neuro Re-Ed             Nerve glide- Sciatic             Bridge    15x          Femoral N glide 10x10" NV           TrA c/ March   10x on ea                                                 Ther Ex             Piriformis stretch             Prone press up 10x10" NV           Side glide              Elliptical 8' NV           NuStep   6' lvl 6                                                  Ther Activity                                       Gait Training                                       Modalities                                       1:1 with PT from 545-630pm

## 2022-11-03 ENCOUNTER — OFFICE VISIT (OUTPATIENT)
Dept: PHYSICAL THERAPY | Facility: CLINIC | Age: 52
End: 2022-11-03

## 2022-11-03 DIAGNOSIS — M54.42 ACUTE BILATERAL LOW BACK PAIN WITH BILATERAL SCIATICA: Primary | ICD-10-CM

## 2022-11-03 DIAGNOSIS — Z98.890 S/P ARTHROSCOPY OF LEFT SHOULDER: ICD-10-CM

## 2022-11-03 DIAGNOSIS — M54.41 ACUTE BILATERAL LOW BACK PAIN WITH BILATERAL SCIATICA: Primary | ICD-10-CM

## 2022-11-03 DIAGNOSIS — M25.512 ACUTE PAIN OF LEFT SHOULDER: ICD-10-CM

## 2022-11-03 NOTE — PROGRESS NOTES
Daily Note     Today's date: 11/3/2022  Patient name: Suzzanna Cowden  : 1970  MRN: [id number]  Referring provider: Oumou Dorado  Dx:   Encounter Diagnosis     ICD-10-CM    1  Acute bilateral low back pain with bilateral sciatica  M54 42     M54 41    2  Acute pain of left shoulder  M25 512    3  S/P arthroscopy of left shoulder  Z98 890        Start Time: 1745  Stop Time: 1830  Total time in clinic (min): 45 minutes    Subjective: Pt offers no new complaints about lumbar spine or shoulder this afternoon  Objective: See treatment diary below      Assessment: Tolerated treatment well  Patient exhibited good technique with therapeutic exercises and would benefit from continued PT      Plan: Continue per plan of care        Precautions: A-fib, Labral repair , SVT, s/p L shoulder arthroscopy , Blood clot (R) UE from IV, pt on blood thinners 10/24/22 Dr cleared pt for PT    Manuals 10/3 10/7 10/11 10/13 10/18 10/25 10/27 11/1 11/3    PROM of L shoulder nv 8' 8' 8' 8' 4' 8' gentle 4' 4'    Scap mobs L  nv             Rhythmic stabs  gentle ER/IR 2x10 gentle ER/IR 2x10 gentle ER/IR 2x10  gentle ER/IR 2x10 gentle ER/IR 2x10      L UT STM         3' 3'    Neuro Re-Ed             Scap retractions 30x 30x  30x         C/s Retractions             Serratus press  AAROM 20x 20x 20x         Prone shoulder Row   1x10 30x 3x10 1# 3x10 2# 2x10  #1 btb 3x10  btb 3x10    Prone shoulder ext   1x10 30x 3x10 1# 3x10 1# 2x10 1# btb 3x10  btb 3x10    TB ER        3x10 rtb 3x10 gtb    TB IR        3x10 rtb  3x10 gtb    Inclined bench T        nv      Ther Ex             Pendulums cw/ccw 30x  30x all planes 30x all planes  30x all planes         Wrist ROM             UT stretch       10x10"ea      LS stretch             T/S extension             Table slides Forward x 20 HEP 30x flexion/30x abd           [place] HEP  5'  5'         Elbow AROM 30x 30x 30x 30x         SL ER  AAROM 2x10  AROM 2x10 AROM 2x10 3x10 1# 2x10 2# 2x10 1#      SL ABD  AAROM 2x10  AROM 2x10 elbow flexed  AROM 2x10 elbow flexed  3x10 1#        Bicep curl       3x10 2# 3x10 2# 3x10 4#     [place] Triceps extension        3x15 12# c/ rope     Ther Activity                                       Gait Training                                       Modalities             CP   5' 5'                          Manuals 10/25 10/27 11/1 11/3         CPA L1-5  Gr III 4' NV Gr III 4' GR III 8'         UPA  Gr III 4' NV Gr III 4'           T/S prone Gr V   performed                        Neuro Re-Ed             Nerve glide- Sciatic             Bridge    15x 30x         Femoral N glide 10x10" NV           TrA c/ March   10x on ea                                                 Ther Ex             Piriformis stretch             Prone press up 10x10" NV  10x10"          Side glide              Elliptical 8' NV  6' lvl          NuStep   6' lvl 6                                                  Ther Activity                                       Gait Training                                       Modalities                                       1:1 with PT from 545-630pm

## 2022-11-04 ENCOUNTER — TELEPHONE (OUTPATIENT)
Dept: HEMATOLOGY ONCOLOGY | Facility: CLINIC | Age: 52
End: 2022-11-04

## 2022-11-04 NOTE — TELEPHONE ENCOUNTER
Returned telephone call spoke wit Pt  She states that she went to ED and was found to have Superficial venous thrombosis right arm post shoulder surgery  They started her on Xarelto 10/15/22 then on 10/19 she went back to ED for left arm pain which an US confirmed antoher clot  Since she was already started the [place] additional tx needed  Today she called to report she has some pain in her wrist and can feel a pea size lump  Dr Venancio Jimenez is recommending she return to the ED for an assessment without seeing her he would not be able to do an assessment  Pt states she understands but after going to the ED the 2nd x and nothing was changed she did not want to pay another $300 00 which she has now paid a total of 600 00  She has been taking the Xarelto as prescribed  She stated she will continue to monitor this new lump and if it moves or pain gets worse she will go to the ED  On Thursday she tripped over her dog stating she hit her knee which bled a minute or 2 and then stopped  She also hit her hip but there is no bruising or pain  I reminded her when being on an anticoag it is recommended that if you fall and hit your head or trunk you should be assessed in the ED for possible internal bleeding  She stated she understands  She will call our office back on Monday with an update  She will take it easy over the weekend and not lift weight

## 2022-11-08 ENCOUNTER — OFFICE VISIT (OUTPATIENT)
Dept: PHYSICAL THERAPY | Facility: CLINIC | Age: 52
End: 2022-11-08

## 2022-11-08 DIAGNOSIS — M54.41 ACUTE BILATERAL LOW BACK PAIN WITH BILATERAL SCIATICA: ICD-10-CM

## 2022-11-08 DIAGNOSIS — M25.512 ACUTE PAIN OF LEFT SHOULDER: Primary | ICD-10-CM

## 2022-11-08 DIAGNOSIS — Z98.890 S/P ARTHROSCOPY OF LEFT SHOULDER: ICD-10-CM

## 2022-11-08 DIAGNOSIS — M54.42 ACUTE BILATERAL LOW BACK PAIN WITH BILATERAL SCIATICA: ICD-10-CM

## 2022-11-08 NOTE — PROGRESS NOTES
Daily Note     Today's date: 2022  Patient name: Valdez Denney  : 1970  MRN: [id number]  Referring provider: Elaina Colindres  Dx:   Encounter Diagnosis     ICD-10-CM    1  Acute pain of left shoulder  M25 512    2  Acute bilateral low back pain with bilateral sciatica  M54 42     M54 41    3  S/P arthroscopy of left shoulder  Z98 890        Start Time: 1745  Stop Time: 1830  Total time in clinic (min): 45 minutes    Subjective: Pt concerned about tenderness and inflammation in her vein of right arm  Pt has been taking Xarelto and has not missed a dose  She was diagnosed with superficial clots and phlebitis  Otherwise, her left shoulder and back are doing well  Objective: See treatment diary below      Assessment: Recommend patient follow up with hematologist regarding her SVT and thrombophlebitis  Tolerated treatment well today  Avoided RUE during left shoulder strengthening  Patient exhibited good technique with therapeutic exercises and would benefit from continued PT  Plan: Continue per plan of care        Precautions: A-fib, Labral repair , SVT, s/p L shoulder arthroscopy , Blood clot (R) UE from IV, pt on blood thinners 10/24/22 Dr cleared pt for PT    Manuals 10/3 10/7 10/11 10/13 10/18 10/25 10/27 11/1 11/3 11/8   PROM of L shoulder nv 8' 8' 8' 8' 4' 8' gentle 4' 4' 4'   Scap mobs L  nv             Rhythmic stabs  gentle ER/IR 2x10 gentle ER/IR 2x10 gentle ER/IR 2x10  gentle ER/IR 2x10 gentle ER/IR 2x10      L UT STM         3' 3'    Neuro Re-Ed             Scap retractions 30x 30x  30x         C/s Retractions             Serratus press  AAROM 20x 20x 20x         Prone shoulder Row   1x10 30x 3x10 1# 3x10 2# 2x10  #1 btb 3x10  btb 3x10 3x10 btb single arm L   Prone shoulder ext   1x10 30x 3x10 1# 3x10 1# 2x10 1# btb 3x10  btb 3x10 3x10 btb single arm L   TB ER        3x10 rtb 3x10 gtb 3x10 gtb    TB IR        3x10 rtb  3x10 gtb 3x10 gtb    Inclined bench T        nv Ther Ex             Pendulums cw/ccw 30x  30x all planes 30x all planes  30x all planes         Wrist ROM             UT stretch       10x10"ea      LS stretch             T/S extension             Table slides Forward x 20 HEP 30x flexion/30x abd           [place] HEP  5'  5'         Elbow AROM 30x 30x 30x 30x         SL ER  AAROM 2x10  AROM 2x10 AROM 2x10 3x10 1# 2x10 2# 2x10 1#      SL ABD  AAROM 2x10  AROM 2x10 elbow flexed  AROM 2x10 elbow flexed  3x10 1#        Bicep curl       3x10 2# 3x10 2# 3x10 4#     [place] Triceps extension        3x15 12# c/ rope     Ther Activity                                       Gait Training                                       Modalities             CP   5' 5'                          Manuals 10/25 10/27 11/1 11/3 11/8        CPA L1-5  Gr III 4' NV Gr III 4' GR III 8' Gr III 8'        UPA  Gr III 4' NV Gr III 4'           T/S prone Gr V   performed                        Neuro Re-Ed             Nerve glide- Sciatic             Bridge    15x 30x 30x        Femoral N glide 10x10" NV           TrA c/ March   10x on ea                                                 Ther Ex             Piriformis stretch             Prone press up 10x10" NV  10x10"  10x10"        Side glide              Elliptical 8' NV  6' lvl  6' lvl         NuStep   6' lvl 6                                                  Ther Activity                                       Gait Training                                       Modalities                                       1:1 with PT from 545-630pm

## 2022-11-09 ENCOUNTER — TELEPHONE (OUTPATIENT)
Dept: HEMATOLOGY ONCOLOGY | Facility: CLINIC | Age: 52
End: 2022-11-09

## 2022-11-09 DIAGNOSIS — I82.611 SUPERFICIAL VENOUS THROMBOSIS OF ARM, RIGHT: Primary | ICD-10-CM

## 2022-11-09 NOTE — TELEPHONE ENCOUNTER
CALL TRANSFER   Reason for patient call? Patient calling regarding a new vein (painful) she discovered on her arm last week  She would like [employer] opinion on it  Patient's primary physician? Rolan Traore   RN call was transferred to and time it was transferred? Naila Snow @ 8:51AM   Informed patient that the message will be forwarded to the team and someone will get back to them as soon as possible    Did you relay this information to the patient?  Yes

## 2022-11-09 NOTE — TELEPHONE ENCOUNTER
Telephone call spoke with Pt  Hoang TYSON ordered venous doppler on both arms  She is scheduled at [facility] tomorrow at 3 pm   Pt stated she knows the [facility] and that appt will work

## 2022-11-10 ENCOUNTER — APPOINTMENT (OUTPATIENT)
Dept: PHYSICAL THERAPY | Facility: CLINIC | Age: 52
End: 2022-11-10

## 2022-11-10 ENCOUNTER — TELEPHONE (OUTPATIENT)
Dept: HEMATOLOGY ONCOLOGY | Facility: CLINIC | Age: 52
End: 2022-11-10

## 2022-11-10 ENCOUNTER — HOSPITAL ENCOUNTER (OUTPATIENT)
Dept: VASCULAR ULTRASOUND | Facility: HOSPITAL | Age: 52
Discharge: HOME | End: 2022-11-10

## 2022-11-10 DIAGNOSIS — I82.611 SUPERFICIAL VENOUS THROMBOSIS OF ARM, RIGHT: ICD-10-CM

## 2022-11-10 NOTE — TELEPHONE ENCOUNTER
Rec'd VM from Blas Ramirez "Hi, this is Blas Ramirez calling from [facility]  Calling with start results on a patient that's of [facility] was the ordering physician  Patient's name is Irma Rhoades date of birth is [birth year] bilateral [place] that was ordered yesterday  She is positive for subacute versus chronic superficial thrombophlebitis focal segment in the cephalic vein at the wrist on the right arm and some chronic stuff through there  That's been there a while  Nothing acute, no deep vein, no DVT is visualized on her left arm is fine  Prelim should be up in about 15 to 20  If you need me for anything, [phone]   Thank you"

## 2022-11-15 ENCOUNTER — EVALUATION (OUTPATIENT)
Dept: PHYSICAL THERAPY | Facility: CLINIC | Age: 52
End: 2022-11-15

## 2022-11-15 DIAGNOSIS — Z98.890 S/P ARTHROSCOPY OF LEFT SHOULDER: ICD-10-CM

## 2022-11-15 DIAGNOSIS — M54.41 ACUTE BILATERAL LOW BACK PAIN WITH BILATERAL SCIATICA: ICD-10-CM

## 2022-11-15 DIAGNOSIS — M25.512 ACUTE PAIN OF LEFT SHOULDER: Primary | ICD-10-CM

## 2022-11-15 DIAGNOSIS — M54.42 ACUTE BILATERAL LOW BACK PAIN WITH BILATERAL SCIATICA: ICD-10-CM

## 2022-11-15 NOTE — PROGRESS NOTES
[id number]    Today's date: 11/15/2022  Patient name: Lis Moreno  : 1970  MRN: [id number]  Referring provider: Apollo Dillard  Dx:   Encounter Diagnosis     ICD-10-CM    1  Acute pain of left shoulder  M25 512    2  Acute bilateral low back pain with bilateral sciatica  M54 42     M54 41    3  S/P arthroscopy of left shoulder  Z98 890                   Assessment  Assessment details: Patient is 6 5 weeks s/p left shoulder arthroscopy with extensive debridement on 22  Patient is doing well post operatively  She has full passive ROM in all planes  She has been participating in a strength program with good tolerance  Unfortunately, patient found to have SVT  in the basilic vein of the right forearm extending into the hand in the superficial venous system on 10/19/22  Pt is now an anti-coagulants  Pt also started to experiencing worsening of lower back pain with bilateral radicular symptoms  I evaluated this and started to treat this at patient's request  However, despite several set backs due to other body parts, patient is progressing well with PT and HEP  She still presents with mild weakness in shoulder girdle limiting full participation with activities  FOTO has improved to 62  Pt will benefit from continued strengthening and endurance at her tolerance in order to improve to her prior level of function  Thank you for this referral         Impairments: abnormal muscle tone, abnormal or restricted ROM, abnormal movement, activity intolerance, impaired physical strength, lacks appropriate home exercise program and pain with function  Understanding of Dx/Px/POC: good   Prognosis: good    Goals  Short Term Goals: to be achieved by 4 weeks  1) Patient to be independent with basic HEP -MET  2) Decrease pain to 3/10 at its worst -MET  3) Increase shoulder PROM by 5-10 degrees -MET    Long Term Goals: to be achieved by discharge  1) FOTO equal to or greater than expected  -Partially met  2) Patient to be independent with comprehensive HEP  -Partially met  3) Abolish pain for improved quality of life -Partially met  4) Increase shoulder ROM to within functional limits to improve a/iadls -Partially met  5) Increase shoulder strength to 5/5 MMT grade in all planes to improve a/iadls  -Partially met  6) Patient to return to full duty at work  -Met    Plan  Patient would benefit from: skilled PT  Planned modality interventions: cryotherapy and TENS  Planned therapy interventions: manual therapy, neuromuscular re-education, patient education, therapeutic activities, therapeutic exercise and functional ROM exercises  Frequency: 2x per week for 4-6 weeks  Treatment plan discussed with: patient        Subjective Evaluation    History of Present Illness  Mechanism of injury: History of Current Injury: Pt is 3 days s/p left shoulder arthroscopy with extensive debridement with Dr Marsh Pi  According to Op note, patient may begin passive/active motion as tolerated and progress strengthening starting at 2-3 weeks  Pt still has lingering effects of nerve block with loss of sensation to the left upper extremity  Pt currently has no pain because of nerve block  She does have extremity movement  Pt does have a sling on and was told to wear it for a few days  Pt is off of work this week and potentially returning next week  Special Questions:Pt has numbness and tingling throughout right UE     Patient goals:  Resume normal ADLs, return to normal exercise routine, drive, relieve pain   Hobbies/Interest: Exercises, walks  Occupation: [email or web address]    Pain  Current pain ratin  At best pain ratin  At worst pain ratin    Hand dominance: right    Treatments  Previous treatment: physical therapy, medication and injection treatment  Current treatment: medication  Patient Goals  Patient goals for therapy: decreased pain, increased motion, increased strength, independence with ADLs/IADLs and return to sport/leisure activities          Objective     Neurological Testing     Sensation     Shoulder   Left Shoulder   Diminished: light touch, pin prick, hot/cold discrimination and proprioception    Comments   Left light touch: Throughout LUE  Left pin prick: Throughout LUE    Reflexes   Left   Biceps (C5/C6): normal (2+)  Brachioradialis (C6): normal (2+)  Triceps (C7): normal (2+)  Rolle's reflex: negative    AROM of the Left Shoulder   Flexion: 165 degrees   Abduction: 180 degrees   External rotation 90°: 90 degrees   Internal rotation 90: 20 degrees   IR BTB: Inferior scapular angle    Passive Range of Motion   Left Shoulder   Flexion: 165 degrees   Abduction: 175 degrees   External rotation 90°: 90 degrees   Internal rotation 90°: 85 degrees     Additional Passive Range of Motion Details  Assessed to 1st resistance       MMT: Left shoulder  Flexion: 4-   Abduction: 4-   External rotation°: 4   Internal rotation°: 4+  Lower trap: 4-  Mid trap: 4-       Tests     Additional Tests Details   Strength: 55# R/5# L     *Normal AROM of wrist, hand, and elbow on the R  *Good capillary refill  *Intact distal pulses         Precautions: A-fib, Labral repair 2009, SVT, s/p L shoulder arthroscopy 9/30, Blood clot (R) UE from IV, pt on blood thinners 10/24/22 Dr cleared pt for PT    Manuals 11/15    10/18 10/25 10/27 11/1 11/3 11/8   PROM of L shoulder 5'    8' 4' 8' gentle 4' 4' 4'   Scap mobs L  5'            Rhythmic stabs      gentle ER/IR 2x10 gentle ER/IR 2x10      L UT STM  4'       3' 3'    Neuro Re-Ed             Scap retractions             C/s Retractions             Serratus press             Prone shoulder Row     3x10 1# 3x10 2# 2x10  #1 btb 3x10  btb 3x10 3x10 btb single arm L   Prone shoulder ext     3x10 1# 3x10 1# 2x10 1# btb 3x10  btb 3x10 3x10 btb single arm L   TB ER        3x10 rtb 3x10 gtb 3x10 gtb    TB IR        3x10 rtb  3x10 gtb 3x10 gtb    Inclined bench T        nv      Ther Ex             Pendulums     30x all planes         Wrist ROM             UT stretch       10x10"ea      LS stretch             T/S extension             Table slides             [place]             Elbow AROM             SL ER     3x10 1# 2x10 2# 2x10 1#      SL ABD     3x10 1#        Bicep curl       3x10 2# 3x10 2# 3x10 4#     [place] Triceps extension        3x15 12# c/ rope     Ther Activity                                       Gait Training                                       Modalities             CP                              Manuals 10/25 10/27 11/1 11/3 11/8 11/15       CPA L1-5  Gr III 4' NV Gr III 4' GR III 8' Gr III 8'        UPA  Gr III 4' NV Gr III 4'           T/S prone Gr V   performed                        Neuro Re-Ed             Nerve glide- Sciatic             Bridge    15x 30x 30x        Femoral N glide 10x10" NV           TrA c/ March   10x on ea                                                 Ther Ex             Piriformis stretch             Prone press up 10x10" NV  10x10"  10x10"        Side glide              Elliptical 8' NV  6' lvl  6' lvl  6' lvl 1       NuStep   6' lvl 6                                                  Ther Activity                                       Gait Training                                       Modalities                                       1:1 with PT from 545-630pm    Pt was re-evaluated today x 15 minutes

## 2022-11-17 ENCOUNTER — OFFICE VISIT (OUTPATIENT)
Dept: PHYSICAL THERAPY | Facility: CLINIC | Age: 52
End: 2022-11-17

## 2022-11-17 DIAGNOSIS — M25.512 ACUTE PAIN OF LEFT SHOULDER: Primary | ICD-10-CM

## 2022-11-17 NOTE — PROGRESS NOTES
Daily Note     Today's date: 2022  Patient name: Nida Mcadams  : 1970  MRN: [id number]  Referring provider: Celeste Richardson  Dx:   Encounter Diagnosis     ICD-10-CM    1  Acute pain of left shoulder  M25 512                      Subjective: Pt reports having a bad migraine today  She does experience and aura with associated migraines  Kirsten Cuellar denies a new onset of Dizziness, Dysphagia, Dysarthria, Drop attacks, Diplopia and Ataxia  Pt does admit to chronic migraines but they seem to be worsening  Pt was able to walk on the TM last night for 30 minutes without issues  Denies any inciting event or trauma  Has been taking tylenol for symptoms  Objective: See treatment diary below  +ttp in left UT  Tenderness to left cervical paraspinals    Pain with c/s flexion (50% restricted)  Pain with c/s extension (WNL ROM)   Pain with Rot (50% restricted)     Limited AA and OA mobility    Relief with SOR and distraction    Assessment: Pt attends today and requests assessment of neck secondary to worsening of migraine  Pt admits to frequent migraines on  and started with one today at 1:30PM  Objective findings above  Attempted STM and SOR to the cervical spine which were mildly effective  Moderate tp present at L UT  Performed limited treatment today secondary to pain/migraine  Will resume shoulder exercises next session as tolerated  Pt given AA and OA self mobilization to help with migraine  Mild improvement with MT today  Plan: Continue per plan of care        Precautions: A-fib, Labral repair , SVT, s/p L shoulder arthroscopy , Blood clot (R) UE from IV, pt on blood thinners 10/24/22  cleared pt for PT    Manuals 11/15 11/17   10/18 10/25 10/27 11/1 11/3 11/8   PROM of L shoulder 5' 5'   8' 4' 8' gentle 4' 4' 4'   Scap mobs L  5'            Rhythmic stabs      gentle ER/IR 2x10 gentle ER/IR 2x10      L UT STM  4' 5'      3' 3'    SOR c/ distraction  5'           Neuro Re-Ed Scap retractions             C/s Retractions             Serratus press             Prone shoulder Row     3x10 1# 3x10 2# 2x10  #1 btb 3x10  btb 3x10 3x10 btb single arm L   Prone shoulder ext     3x10 1# 3x10 1# 2x10 1# btb 3x10  btb 3x10 3x10 btb single arm L   TB ER        3x10 rtb 3x10 gtb 3x10 gtb    TB IR        3x10 rtb  3x10 gtb 3x10 gtb    Inclined bench T        nv      Ther Ex             Pendulums     30x all planes         Wrist ROM             UT stretch       10x10"ea      LS stretch             T/S extension             Table slides             Rocco             Elbow AROM             SL ER     3x10 1# 2x10 2# 2x10 1#      SL ABD     3x10 1#        Bicep curl       3x10 2# 3x10 2# 3x10 4#     Tania Triceps extension        3x15 12# c/ rope     Ther Activity                                       Gait Training                                       Modalities             MH to neck  5'                            Manuals 10/25 10/27 11/1 11/3 11/8 11/15       CPA L1-5  Gr III 4' NV Gr III 4' GR III 8' Gr III 8'        UPA  Gr III 4' NV Gr III 4'           T/S prone Gr V   performed                        Neuro Re-Ed             Nerve glide- Sciatic             Bridge    15x 30x 30x        Femoral N glide 10x10" NV           TrA c/ March   10x on ea                                                 Ther Ex             Piriformis stretch             Prone press up 10x10" NV  10x10"  10x10"        Side glide              Elliptical 8' NV  6' lvl  6' lvl  6' lvl 1       NuStep   6' lvl 6                                                  Ther Activity                                       Gait Training                                       Modalities             MH to neck                           1:1 with PT from 555-625pm

## 2022-11-22 ENCOUNTER — OFFICE VISIT (OUTPATIENT)
Dept: PHYSICAL THERAPY | Facility: CLINIC | Age: 52
End: 2022-11-22

## 2022-11-22 DIAGNOSIS — M54.42 ACUTE BILATERAL LOW BACK PAIN WITH BILATERAL SCIATICA: ICD-10-CM

## 2022-11-22 DIAGNOSIS — Z98.890 S/P ARTHROSCOPY OF LEFT SHOULDER: ICD-10-CM

## 2022-11-22 DIAGNOSIS — Z47.89 ORTHOPEDIC AFTERCARE: ICD-10-CM

## 2022-11-22 DIAGNOSIS — M54.41 ACUTE BILATERAL LOW BACK PAIN WITH BILATERAL SCIATICA: ICD-10-CM

## 2022-11-22 DIAGNOSIS — M25.512 ACUTE PAIN OF LEFT SHOULDER: Primary | ICD-10-CM

## 2022-11-22 NOTE — PROGRESS NOTES
Daily Note     Today's date: 2022  Patient name: Ana Hutson  : 1970  MRN: [id number]  Referring provider: Brisa Hernandez  Dx:   Encounter Diagnosis     ICD-10-CM    1  Acute pain of left shoulder  M25 512       2  Acute bilateral low back pain with bilateral sciatica  M54 42     M54 41       3  S/P arthroscopy of left shoulder  Z98 890       4  Orthopedic aftercare  Z47 89                      Subjective: Pt overall doing fine today  She denies having a migraine  She will leave the country on Saturday for a trip to [facility]  She denies much lower back pain or N&T  Objective: See treatment diary below      Assessment: Treatment focused on the left shoulder  Pt is 2 months post op  Resumed shoulder strengthening  Tolerated treatment well  Passive ROM nearly full at this time  Pt does have left neck and periscapular discomfort  This was modulated well with scapular mobilizations  Progressed core stabilization exercises  Patient exhibited good technique with therapeutic exercises and would benefit from continued PT  Pt was appropriately fatigued with treatment  Plan: Continue per plan of care        Precautions: A-fib, Labral repair , SVT, s/p L shoulder arthroscopy , Blood clot (R) UE from IV, pt on blood thinners 10/24/22 Dr cleared pt for PT    Manuals 11/15 11/17 11/22  10/18 10/25 10/27 11/1 11/3 11/8   PROM of L shoulder 5' 5' 5'  8' 4' 8' gentle 4' 4' 4'   Scap mobs L  5'            Rhythmic stabs      gentle ER/IR 2x10 gentle ER/IR 2x10      L UT STM  4' 5' 5'     3' 3'    SOR c/ distraction  5'           Neuro Re-Ed             Scap retractions             C/s Retractions             Serratus press             Prone shoulder Row   standing with TB 3x15  3x10 1# 3x10 2# 2x10  #1 btb 3x10  btb 3x10 3x10 btb single arm L   Prone shoulder ext   standing with TB 3x15   3x10 1# 3x10 1# 2x10 1# btb 3x10  btb 3x10 3x10 btb single arm L   TB ER        3x10 rtb 3x10 gtb 3x10 gtb TB IR        3x10 rtb  3x10 gtb 3x10 gtb    Inclined bench T        nv      Ther Ex             Pendulums     30x all planes         Wrist ROM             UT stretch       10x10"ea      LS stretch             T/S extension             Table slides             Rocco             Elbow AROM             SL ER   2x10 2#  3x10 1# 2x10 2# 2x10 1#      SL ABD   2x10 2#  3x10 1#        Bicep curl    3x10 5#   3x10 2# 3x10 2# 3x10 4#     [place] Triceps extension   3x15 12# c/ rope     3x15 12# c/ rope     Ther Activity                                       Gait Training                                       Modalities              to neck  5'                            Manuals 10/25 10/27 11/1 11/3 11/8 11/15 11/22      CPA L1-5  Gr III 4' NV Gr III 4' GR III 8' Gr III 8'        UPA  Gr III 4' NV Gr III 4'           T/S prone Gr V   performed                        Neuro Re-Ed             Nerve glide- Sciatic       15x B      Bridge    15x 30x 30x  30x      Femoral N glide 10x10" NV           TrA c/ March   10x on ea          Bridge dog       C/ PT cueing x 10      Quad hip extension       10x                   Ther Ex             Piriformis stretch             Prone press up 10x10" NV  10x10"  10x10"        Side glide              Elliptical 8' NV  6' lvl  6' lvl  6' lvl 1 6' lvl 1      NuStep   6' lvl 6                                                  Ther Activity                                       Gait Training                                       Modalities              to neck                           1:1 with PT from 547-630pm

## 2022-12-06 ENCOUNTER — APPOINTMENT (OUTPATIENT)
Dept: PHYSICAL THERAPY | Facility: CLINIC | Age: 52
End: 2022-12-06

## 2022-12-08 ENCOUNTER — OFFICE VISIT (OUTPATIENT)
Dept: PHYSICAL THERAPY | Facility: CLINIC | Age: 52
End: 2022-12-08

## 2022-12-08 DIAGNOSIS — M25.512 ACUTE PAIN OF LEFT SHOULDER: ICD-10-CM

## 2022-12-08 DIAGNOSIS — M54.42 ACUTE BILATERAL LOW BACK PAIN WITH BILATERAL SCIATICA: Primary | ICD-10-CM

## 2022-12-08 DIAGNOSIS — M54.41 ACUTE BILATERAL LOW BACK PAIN WITH BILATERAL SCIATICA: Primary | ICD-10-CM

## 2022-12-08 DIAGNOSIS — Z47.89 ORTHOPEDIC AFTERCARE: ICD-10-CM

## 2022-12-08 DIAGNOSIS — Z98.890 S/P ARTHROSCOPY OF LEFT SHOULDER: ICD-10-CM

## 2022-12-08 NOTE — PROGRESS NOTES
Daily Note     Today's date: 2022  Patient name: Lee Baca  : 1970  MRN: [id number]  Referring provider: Kimberly Hodge  Dx:   Encounter Diagnosis     ICD-10-CM    1  Acute bilateral low back pain with bilateral sciatica  M54 42     M54 41       2  S/P arthroscopy of left shoulder  Z98 890       3  Orthopedic aftercare  Z47 89       4  Acute pain of left shoulder  M25 512           Start Time: 174  Stop Time: 1830  Total time in clinic (min): 45 minutes    Subjective: Pt returns from her trip to [facility]  She said her shoulder, back, and neck felt really good while away and she walked 20,000 steps per day  She does admit to increased shoulder pain after moving her luggage for 2 miles one day  She denies any recent numbness or tingling in the LE  Objective: See treatment diary below      Assessment: Pt is doing well 2 months s/p shoulder arthroscopy  Increase strength PREs and resistance today without much discomfort  Tolerated treatment well  Patient would benefit from continued PT  Plan: Continue per plan of care  Consider decreasing to 1x per week still doing well   Pt will begin home strengthening program       Precautions: A-fib, Labral repair , SVT, s/p L shoulder arthroscopy , Blood clot (R) UE from IV, pt on blood thinners 10/24/22  cleared pt for PT    Manuals 11/15 11/17 11/22 12/8         PROM of L shoulder 5' 5' 5' 5'          Scap mobs L  5'   5' c/ STM         Rhythmic stabs             L UT STM  4' 5' 5'          SOR c/ distraction  5'           Neuro Re-Ed             Scap retractions             C/s Retractions             Serratus press             Prone shoulder Row   standing with TB 3x15 Bent over 8# 3x10 B          Prone shoulder ext   standing with TB 3x15           TB ER             TB IR             Inclined bench T             Ther Ex             Pendulums             Wrist ROM             UT stretch             LS stretch             T/S extension Table slides             Rocco             Elbow AROM             SL ER   2x10 2#          SL ABD   2x10 2#          Bicep curl    3x10 5#          [place] Triceps extension   3x15 12# c/ rope          Shoulder flexion    3x10 3#         Ther Activity             Shoulder press    OH 3x10 3#                      Gait Training                                       Modalities             MH to neck  5'                            Manuals 10/25 10/27 11/1 11/3 11/8 11/15 11/22 12/8     CPA L1-5  Gr III 4' NV Gr III 4' GR III 8' Gr III 8'   Gr III 5'     UPA  Gr III 4' NV Gr III 4'           T/S prone Gr V   performed           Theragun STM        3'     Neuro Re-Ed             Nerve glide- Sciatic       15x B      Bridge    15x 30x 30x  30x      Femoral N glide 10x10" NV           TrA c/ March   10x on ea          Bridge dog       C/ PT cueing x 10      Quad hip extension       10x                   Ther Ex             Piriformis stretch             Prone press up 10x10" NV  10x10"  10x10"        Side glide              Elliptical 8' NV  6' lvl  6' lvl  6' lvl 1 6' lvl 1 7' lvl 1     NuStep   6' lvl 6                                                  Ther Activity                                       Gait Training                                       Modalities              to neck                           1:1 with PT from 545-630pm

## 2022-12-13 ENCOUNTER — OFFICE VISIT (OUTPATIENT)
Dept: PHYSICAL THERAPY | Facility: CLINIC | Age: 52
End: 2022-12-13

## 2022-12-13 DIAGNOSIS — Z98.890 S/P ARTHROSCOPY OF LEFT SHOULDER: ICD-10-CM

## 2022-12-13 DIAGNOSIS — M54.41 ACUTE BILATERAL LOW BACK PAIN WITH BILATERAL SCIATICA: Primary | ICD-10-CM

## 2022-12-13 DIAGNOSIS — Z47.89 ORTHOPEDIC AFTERCARE: ICD-10-CM

## 2022-12-13 DIAGNOSIS — M54.42 ACUTE BILATERAL LOW BACK PAIN WITH BILATERAL SCIATICA: Primary | ICD-10-CM

## 2022-12-13 DIAGNOSIS — M25.512 ACUTE PAIN OF LEFT SHOULDER: ICD-10-CM

## 2022-12-13 NOTE — PROGRESS NOTES
PT-Discharge    Today's date: 2022  Patient name: Calderon Rowe  : 1970  MRN: [id number]  Referring provider: Ambreen Rodney  Dx:   Encounter Diagnosis     ICD-10-CM    1  Acute bilateral low back pain with bilateral sciatica  M54 42     M54 41       2  S/P arthroscopy of left shoulder  Z98 890       3  Orthopedic aftercare  Z47 89       4  Acute pain of left shoulder  M25 512                      Assessment  Assessment details: Patient is 2 5 months s/p left shoulder arthroscopy with extensive debridement on 22  Patient is doing well post operatively  She has full Active and passive ROM in all planes  Her strength at her shoulder is full  She states that today has to be the last visit secondary to work and busy schedule  I updated her HEP and she should continue to strengthen her shoulder  Pt's FOTO has improved  All goals for shoulder were met  Pt also denies much lumbar spine discomfort or numbness/tingling her legs  Her SLR, femoral nerve tension, and ROM have normalized  Pt will be formally discharged at this time  If symptoms return, she is welcome to return to my office  Goals  Short Term Goals: to be achieved by 4 weeks  1) Patient to be independent with basic HEP -MET  2) Decrease pain to 3/10 at its worst -MET  3) Increase shoulder PROM by 5-10 degrees -MET    Long Term Goals: to be achieved by discharge  1) FOTO equal to or greater than expected  -MET  2) Patient to be independent with comprehensive HEP -MET  3) Abolish pain for improved quality of life  -MET  4) Increase shoulder ROM to within functional limits to improve a/iadls  -MET  5) Increase shoulder strength to 5/5 MMT grade in all planes to improve a/iadls  -MET  6) Patient to return to full duty at work   -MET    Goals  Short Term Goals: to be achieved by 4 weeks  1) Patient to be independent with basic HEP -MET  2) Decrease pain to 3/10 at its worst -MET  3) Increase Lumbar ROM to full without radiating symptoms-MET      Long Term Goals: to be achieved by discharge  1) FOTO equal to or greater than expected  -MET  2) Ambulation to improve to maximal level of function-MET  3) Improve sitting to PLOF without radiating symptoms in LE-MET    Plan  D/C to HEP        Subjective Evaluation    History of Present Illness  Mechanism of injury: History of Current Injury: Pt is 3 days s/p left shoulder arthroscopy with extensive debridement with Dr Robin Alford  According to Op note, patient may begin passive/active motion as tolerated and progress strengthening starting at 2-3 weeks  Pt still has lingering effects of nerve block with loss of sensation to the left upper extremity  Pt currently has no pain because of nerve block  She does have extremity movement  Pt does have a sling on and was told to wear it for a few days  Pt is off of work this week and potentially returning next week  Special Questions:Pt has numbness and tingling throughout right UE  Patient goals:  Resume normal ADLs, return to normal exercise routine, drive, relieve pain   Hobbies/Interest: Exercises, walks  Occupation: Sales    Pain  Current pain ratin  At best pain ratin  At worst pain ratin    Hand dominance: right    Treatments  Previous treatment: physical therapy, medication and injection treatment  Current treatment: medication  Patient Goals  Patient goals for therapy: decreased pain, increased motion, increased strength, independence with ADLs/IADLs and return to sport/leisure activities          Objective     Neurological Testing     Sensation     Shoulder   Left Shoulder   Diminished: light touch, pin prick, hot/cold discrimination and proprioception    Comments   Left light touch:  Throughout LUE  Left pin prick: Throughout LUE    Reflexes   Left   Biceps (C5/C6): normal (2+)  Brachioradialis (C6): normal (2+)  Triceps (C7): normal (2+)  Rolle's reflex: negative    AROM of the Left Shoulder   Flexion: 175 degrees   Abduction: 180 degrees   External rotation 90°: 90 degrees   Internal rotation 90: 60 degrees   IR BTB: Inferior scapular angle    Passive Range of Motion   Left Shoulder   Flexion: 165 degrees   Abduction: 175 degrees   External rotation 90°: 90 degrees   Internal rotation 90°: 85 degrees     Additional Passive Range of Motion Details  Assessed to 1st resistance  MMT: Left shoulder  Flexion: 5  Abduction: 5   External rotation°: 5   Internal rotation°: 5  Lower trap: 5  Mid trap: 5       Tests     Additional Tests Details   Strength: 55# R/5# L     *Normal AROM of wrist, hand, and elbow on the R  *Good capillary refill  *Intact distal pulses    bjective     Neurological Testing     Sensation     Lumbar   Left   Intact: light touch    Right   Diminished: light touch    Comments   Right light touch: L4-L5     Reflexes   Left   Patellar (L4): brisk (3+)  Achilles (S1): brisk (3+)  Babinski sign: negative  Clonus sign: negative    Right   Patellar (L4): brisk (3+)  Achilles (S1): brisk (3+)  Babinski sign: negative  Clonus sign: negative    Active Range of Motion     Lumbar   Flexion:  WFL  Extension:  WFL  Left lateral flexion:  WFL  Right lateral flexion:  WFL  Left rotation:  WF  Right rotation:  [facility]    Additional Active Range of Motion Details  No Numbness/tingling present    Strength/Myotome Testing     Lumbar   Left   Heel walk: normal  Toe walk: normal    Right   Heel walk: normal  Toe walk: normal    Left Hip   Planes of Motion   Flexion: 5  Abduction: 5  Adduction: 5    Right Hip   Planes of Motion   Flexion: 5  Abduction: 5  Adduction: 5    Left Knee   Flexion: 5  Extension: 5    Right Knee   Flexion: 5  Extension: 5    Left Ankle/Foot   Dorsiflexion: 5  Plantar flexion: 5    Right Ankle/Foot   Dorsiflexion: 5  Plantar flexion: 5    Tests     Lumbar     Left   Positive femoral stretch, quadrant and slump test      Right   Positive quadrant and slump test    Negative femoral stretch       Left Hip   Negative DEMETRIO and FADIR  Right Hip   Positive DEMETRIO  Additional Tests Details  *Slump: Neg  *SLR negative: 90 on left/80 R  *Negative DEMETRIO on right R  *Neg neural tension on the L during Femoral nerve            Precautions: A-fib, Labral repair 2009, SVT, s/p L shoulder arthroscopy 9/30, Blood clot (R) UE from IV, pt on blood thinners 10/24/22 Dr cleared pt for PT    Manuals 11/15 11/17 11/22 12/8         PROM of L shoulder 5' 5' 5' 5'          Scap mobs L  5'   5' c/ STM         Rhythmic stabs             L UT STM  4' 5' 5'          SOR c/ distraction  5'           Neuro Re-Ed             Scap retractions             C/s Retractions             Serratus press             Prone shoulder Row   standing with TB 3x15 Bent over 8# 3x10 B          Prone shoulder ext   standing with TB 3x15           TB ER             TB IR             Inclined bench T             Ther Ex             Pendulums             Wrist ROM             UT stretch             LS stretch             T/S extension             Table slides             Rocco             Elbow AROM             SL ER   2x10 2#          SL ABD   2x10 2#          Bicep curl    3x10 5#          Tania Triceps extension   3x15 12# c/ rope          Shoulder flexion    3x10 3#         Ther Activity             Shoulder press    OH 3x10 3#                      Gait Training                                       Modalities             MH to neck  5'                            Manuals 10/25 10/27 11/1 11/3 11/8 11/15 11/22 12/8     CPA L1-5  Gr III 4' NV Gr III 4' GR III 8' Gr III 8'   Gr III 5'     UPA  Gr III 4' NV Gr III 4'           T/S prone Gr V   performed           Theragun STM        3'     Neuro Re-Ed             Nerve glide- Sciatic       15x B      Bridge    15x 30x 30x  30x      Femoral N glide 10x10" NV           TrA c/ March   10x on ea          Bridge dog       C/ PT cueing x 10      Quad hip extension       10x                   Ther Ex             Piriformis stretch             Prone press up 10x10" NV  10x10"  10x10"        Side glide              Elliptical 8' NV  6' lvl  6' lvl  6' lvl 1 6' lvl 1 7' lvl 1     NuStep   6' lvl 6                                                  Ther Activity                                       Gait Training                                       Modalities             MH to neck                           1:1 with PT from 6-630pm     [employer]  Access Code: [email or web address]  Pt was re-evaluated today

## 2022-12-15 ENCOUNTER — APPOINTMENT (OUTPATIENT)
Dept: PHYSICAL THERAPY | Facility: CLINIC | Age: 52
End: 2022-12-15

## 2022-12-20 ENCOUNTER — APPOINTMENT (OUTPATIENT)
Dept: PHYSICAL THERAPY | Facility: CLINIC | Age: 52
End: 2022-12-20

## 2022-12-22 ENCOUNTER — OFFICE VISIT (OUTPATIENT)
Dept: HEMATOLOGY ONCOLOGY | Facility: CLINIC | Age: 52
End: 2022-12-22

## 2022-12-22 ENCOUNTER — APPOINTMENT (OUTPATIENT)
Dept: PHYSICAL THERAPY | Facility: CLINIC | Age: 52
End: 2022-12-22

## 2022-12-22 VITALS
SYSTOLIC BLOOD PRESSURE: 119 MMHG | TEMPERATURE: 98.4 F | HEIGHT: 66 IN | HEART RATE: 83 BPM | BODY MASS INDEX: 24.37 KG/M2 | DIASTOLIC BLOOD PRESSURE: 82 MMHG | RESPIRATION RATE: 17 BRPM | OXYGEN SATURATION: 98 %

## 2022-12-22 DIAGNOSIS — I80.8 SUPERFICIAL THROMBOPHLEBITIS OF RIGHT UPPER EXTREMITY: Primary | ICD-10-CM

## 2022-12-22 NOTE — PROGRESS NOTES
Hematology/Oncology Outpatient Follow-up  Ne Ford 47 y o  female 1970 [id number]    Date:  12/22/2022      Assessment and Plan:  58-year-old female presents for follow-up  She had a provoked right upper extremity superficial thrombophlebitis  Reviewed with patient this was provoked from IV insertion in the RUE at time of surgery for the left shoulder  Typically treatment of SVT is NSAIDs and warm compress  ED provider at [facility] started her on Xarelto starter pack on 10/15/22, assumably due to occlusive thrombus present though still SVT  She was advised to continue  She had repeat doppler on 11/10/22  This showed chronic superficial thrombophlebitis  She is to complete anticoagulation for a total of 3 months, mid January  She will then discontinue  Advised that she should avoid using that vein for a few months if needed  Labs or IV insertion to be on the left upper extremity  Follow up PRN  HPI:  47year old female presents for follow up for superficial thrombophlebitis       Patient was in the OR with Dr Tana Galaviz on 9/30/22 for an arthroscopic shoulder repair, left shoulder       She was seen in the ED on 10/15/22 at hospitals  She was dx with occlusive thrombus in the basilic vein of the right forearm extending into the hand in the superficial venous systm  No evidence of DVT in the arm        She states she was having symptoms in the right arm for 2 weeks before going to the ED  She states her IV was placed in the right arm for her surgery  She had a lot of pain at time of insertion of this IV  IV was left in by staff though       She was discharged from ED on Xarelto       She came back to the ED on 10/19/22 at [facility] due to continued pain the in the arm  No changes, was advised to continue on Xarelto       She is UTD with mammogram, colonoscopy, and gyn exam  She also sees derm once a year       Interval history:    ROS: Review of Systems   Constitutional: Negative for activity change, appetite change, chills, fatigue, fever and unexpected weight change  Respiratory: Negative for cough and shortness of breath  Cardiovascular: Negative for chest pain, palpitations and leg swelling  Gastrointestinal: Negative for abdominal pain, constipation, diarrhea, nausea and vomiting  Genitourinary: Negative for difficulty urinating, dysuria and hematuria  Musculoskeletal: Negative for arthralgias  Skin: Negative  Neurological: Negative for dizziness, weakness, light-headedness, numbness and headaches  Hematological: Negative  Psychiatric/Behavioral: Negative          Past Medical History:   Diagnosis Date   • Anxiety     During Menopause   • Atrial fibrillation (HCC)    • Bradycardia    • COVID 06/2022   • Elevated liver enzymes    • GERD (gastroesophageal reflux disease)    • Hyperlipidemia    • Hypotension    • Irregular heart beat    • Migraines    • Pneumonia     age 4   • PONV (postoperative nausea and vomiting)    • SVT (supraventricular tachycardia) (HCC)        Past Surgical History:   Procedure Laterality Date   • AUGMENTATION BREAST     • CARDIOVERSION  2020   • CHOLECYSTECTOMY     • COLONOSCOPY     • KNEE ARTHROSCOPY Right    • LAPAROSCOPIC ENDOMETRIOSIS FULGURATION     • ME ARTHROSCOPY SHOULDER SURGICAL BICEPS TENODESIS Right 1/15/2021    Procedure: SHOULDER ARTHROSCOPY WITH ARTHROSCOPIC BICEPS TENODESIS, ROTATOR CUFF REPAIR; POSTERIOR LABRAL REPAIR, SAD;  Surgeon: Connie Harris MD;  Location: [facility];  Service: Orthopedics   • ME SHLDR ARTHROSCOP,DIAGNOSTIC Left 9/30/2022    Procedure: SHOULDER ARTHROSCOPY EXTENSIVE DEBRIDEMENT;  Surgeon: Arash Aguero MD;  Location: [facility];  Service: Orthopedics   • SHOULDER ARTHROSCOPY W/ LABRAL REPAIR Bilateral    • SHOULDER SURGERY Right    • TONSILLECTOMY AND ADENOIDECTOMY     • UPPER GASTROINTESTINAL ENDOSCOPY         Social History     Socioeconomic History   • Marital status:      Spouse name: Not on file   • Number of children: Not on file   • Years of education: Not on file   • Highest education level: Not on file   Occupational History   • Not on file   Tobacco Use   • Smoking status: Never   • Smokeless tobacco: Never   Vaping Use   • Vaping Use: Never used   Substance and Sexual Activity   • Alcohol use: Not Currently   • Drug use: Never   • Sexual activity: Not on file   Other Topics Concern   • Not on file   Social History Narrative   • Not on file     Social Determinants of Health     Financial Resource Strain: Not on file   Food Insecurity: Not on file   Transportation Needs: Not on file   Physical Activity: Not on file   Stress: Not on file   Social Connections: Not on file   Intimate Partner Violence: Not on file   Housing Stability: Not on file       Family History   Problem Relation Age of Onset   • Hypertension Mother    • Glaucoma Mother    • Glaucoma Father    • Hypertension Father        Allergies   Allergen Reactions   • Prednisone Other (See Comments)     "AFIB"   • Macrobid [Nitrofurantoin] Hives   • Other Hives     "Cold Temperatures"   • Reglan [Metoclopramide] Syncope   • Influenza Vaccines Abdominal Pain   • Buspirone Other (See Comments)     Increased her anxiety   • Codeine Other (See Comments) and Vomiting     Severe shaking   • Compazine [Prochlorperazine] Anxiety and Vomiting   • Darvon [Propoxyphene] Other (See Comments) and Vomiting     Severe Shaking   • Demerol [Meperidine] Other (See Comments) and Vomiting     Severe Shaking   • Morphine Other (See Comments) and Vomiting     Severe Shaking   • Percocet [Oxycodone-Acetaminophen] Palpitations     shakiness           Current Outpatient Medications:   •  Ascorbic Acid (VITAMIN C PO), Take by mouth in the morning, Disp: , Rfl:   •  atenolol (TENORMIN) 25 mg tablet, Take 12 5 mg by mouth daily at bedtime , Disp: , Rfl:   •  CALCIUM PO, Take by mouth daily (Patient not taking: No sig reported), Disp: , Rfl:   •  clonazePAM (KlonoPIN) 0 5 mg tablet, Take 0 5 mg by mouth as needed Takes for Migraines, Disp: , Rfl:   •  cycloSPORINE (RESTASIS) 0 05 % ophthalmic emulsion, , Disp: , Rfl:   •  ELDERBERRY PO, Take by mouth daily, Disp: , Rfl:   •  estradiol (ESTRACE) 0 1 mg/g vaginal cream, 0 5 g vaginally twice a week and apply sparingly to vulva daily (Patient not taking: No sig reported), Disp: , Rfl:   •  HYDROcodone-acetaminophen (Norco) 5-325 mg per tablet, Take 1 tablet by mouth every 6 (six) hours as needed (severe shoulder pain only) for up to 12 doses Max Daily Amount: 4 tablets (Patient not taking: No sig reported), Disp: 12 tablet, Rfl: 0  •  Lumigan 0 01 % ophthalmic drops, , Disp: , Rfl:   •  multivitamin (THERAGRAN) TABS, Take 1 tablet by mouth daily, Disp: , Rfl:   •  ondansetron (ZOFRAN) 4 mg tablet, Take 1 tablet (4 mg total) by mouth every 8 (eight) hours as needed for nausea or vomiting, Disp: 20 tablet, Rfl: 0  •  pantoprazole (PROTONIX) 40 mg tablet, Take 1 tablet (40 mg total) by mouth daily in the early morning, Disp: 90 tablet, Rfl: 3  •  rivaroxaban (Xarelto Starter Pack) 15 & 20 MG starter pack, Take by mouth, Disp: , Rfl:   •  VITAMIN D, CHOLECALCIFEROL, PO, Take by mouth in the morning, Disp: , Rfl:   •  Xarelto 20 MG tablet, , Disp: , Rfl:       Physical Exam:  /82 (BP Location: Left arm, Patient Position: Sitting, Cuff Size: Standard)   Pulse 83   Temp 98 4 °F (36 9 °C) (Temporal)   Resp 17   Ht 5' 6" (1 676 m)   SpO2 98%   BMI 24 37 kg/m²     Physical Exam  Vitals reviewed  Constitutional:       General: She is not in acute distress  Appearance: She is well-developed  She is not ill-appearing  HENT:      Head: Normocephalic and atraumatic  Eyes:      General: No scleral icterus  Conjunctiva/sclera: Conjunctivae normal    Cardiovascular:      Rate and Rhythm: Normal rate and regular rhythm  Heart sounds: Normal heart sounds  No murmur heard    Pulmonary: Effort: Pulmonary effort is normal  No respiratory distress  Breath sounds: Normal breath sounds  Abdominal:      Palpations: Abdomen is soft  Tenderness: There is no abdominal tenderness  Musculoskeletal:         General: No tenderness  Normal range of motion  Cervical back: Normal range of motion and neck supple  Right lower leg: No edema  Left lower leg: No edema  Comments: Very small palpable chronic thrombosis of the lateral right wrist, less than 1 inch    Lymphadenopathy:      Cervical: No cervical adenopathy  Skin:     General: Skin is warm and dry  Neurological:      Mental Status: She is alert and oriented to person, place, and time  Cranial Nerves: No cranial nerve deficit  Psychiatric:         Mood and Affect: Mood normal          Behavior: Behavior normal        Patient voiced understanding and agreement in the above discussion  Aware to contact our office with questions/symptoms in the interim  This note has been generated by voice recognition software system  Therefore, there may be spelling, grammar, and or syntax errors  Please contact if questions arise

## 2022-12-27 ENCOUNTER — APPOINTMENT (OUTPATIENT)
Dept: PHYSICAL THERAPY | Facility: CLINIC | Age: 52
End: 2022-12-27

## 2022-12-29 ENCOUNTER — APPOINTMENT (OUTPATIENT)
Dept: PHYSICAL THERAPY | Facility: CLINIC | Age: 52
End: 2022-12-29

## 2023-01-05 ENCOUNTER — OFFICE VISIT (OUTPATIENT)
Dept: OBGYN CLINIC | Facility: OTHER | Age: 53
End: 2023-01-05

## 2023-01-05 VITALS
SYSTOLIC BLOOD PRESSURE: 131 MMHG | WEIGHT: 151 LBS | BODY MASS INDEX: 24.27 KG/M2 | HEIGHT: 66 IN | HEART RATE: 89 BPM | DIASTOLIC BLOOD PRESSURE: 87 MMHG

## 2023-01-05 DIAGNOSIS — M24.812 INTERNAL DERANGEMENT OF LEFT SHOULDER: Primary | ICD-10-CM

## 2023-01-05 NOTE — PROGRESS NOTES
Assessment  Diagnoses and all orders for this visit:    Internal derangement of left shoulder      Discussion and Plan:    Milly's left shoulder is healthy on exam   Her intra-operative pictures were reviewed since this is her first time back in the office  She may return to working out in the gym  recommended light weight and high reps  She should avoid any exercises that cause pain  She is 3 months from surgery so some load-bearing exercises may be uncomfortable and need to be avoided until symptoms improve  Follow up: PRN    Subjective:   Patient ID: Magali Loomis is a 47 y o  female      Gomez Ngo presents to the office 3 months s/p left shoulder arthroscopic debridement  This is her first post-op appointment  She has finished formal PT and has been d/c to an independent HEP  Denies new injury or trauma  Gomez Huber is pleased with her progress  She admits some soreness now that she is lifting weights  Some pain/soreness with overhead weights  Denies pain at night  The following portions of the patient's history were reviewed and updated as appropriate: allergies, current medications, past family history, past medical history, past social history, past surgical history and problem list     Review of Systems   Constitutional: Negative for chills and fever  HENT: Negative for hearing loss  Eyes: Negative for visual disturbance  Respiratory: Negative for shortness of breath  Cardiovascular: Negative for chest pain  Gastrointestinal: Negative for abdominal pain  Musculoskeletal:        As reviewed in the HPI   Skin: Negative for rash  Neurological:        As reviewed in the HPI   Psychiatric/Behavioral: Negative for agitation         Objective:  /87 (BP Location: Left arm, Patient Position: Sitting, Cuff Size: Adult)   Pulse 89   Ht 5' 6" (1 676 m)   Wt 68 5 kg (151 lb)   BMI 24 37 kg/m²       Left Shoulder Exam     Range of Motion   Passive abduction: 90   External rotation: normal Forward flexion: 170   Internal rotation 0 degrees: normal     Muscle Strength   External rotation: 5/5   Supraspinatus: 5/5     Tests   Bell test: negative  Impingement: negative    Other   Erythema: absent  Sensation: normal  Pulse: present             Physical Exam  Constitutional:       Appearance: She is well-developed  HENT:      Head: Normocephalic  Pulmonary:      Breath sounds: Normal breath sounds  No wheezing  Musculoskeletal:      Cervical back: Normal range of motion  Skin:     General: Skin is warm and dry  Neurological:      Mental Status: She is alert and oriented to person, place, and time  Psychiatric:         Behavior: Behavior normal          Thought Content:  Thought content normal          Judgment: Judgment normal

## 2023-05-23 ENCOUNTER — EVALUATION (OUTPATIENT)
Dept: PHYSICAL THERAPY | Facility: CLINIC | Age: 53
End: 2023-05-23

## 2023-05-23 DIAGNOSIS — S46.012D ROTATOR CUFF STRAIN, LEFT, SUBSEQUENT ENCOUNTER: ICD-10-CM

## 2023-05-23 DIAGNOSIS — M24.812 INTERNAL DERANGEMENT OF LEFT SHOULDER: ICD-10-CM

## 2023-05-23 DIAGNOSIS — M54.50 ACUTE BILATERAL LOW BACK PAIN WITHOUT SCIATICA: Primary | ICD-10-CM

## 2023-05-23 NOTE — LETTER
May 23, 2023    [address] [facility]    Patient: Nicole Wilkinson   YOB: 1970   Date of Visit: 2023     Encounter Diagnosis     ICD-10-CM    1  Acute bilateral low back pain without sciatica  M54 50           Dear Dr Parker Arabia: Thank you for your recent referral of Nicole Wilkinson  Please review the attached evaluation summary from Milly's recent visit  Please verify that you agree with the plan of care by signing the attached order  If you have any questions or concerns, please do not hesitate to call  I sincerely appreciate the opportunity to share in the care of one of your patients and hope to have another opportunity to work with you in the near future  Sincerely,    Hema Lindo, PT      Referring Provider:      I certify that I have read the below Plan of Care and certify the need for these services furnished under this plan of treatment while under my care  Ivan Baltazar DO  [address] [facility]  [address] [place] [address]  Via Fax: [phone]          PT Evaluation     Today's date: 2023  Patient name: Nicole Wilkinson  : 1970  MRN: [id number]  Referring provider: Mor Griffith DO  Dx:   Encounter Diagnosis     ICD-10-CM    1  Acute bilateral low back pain without sciatica  M54 50                      Assessment  Assessment details: Nicole Wilkinson is a 47 y o  female who presents with signs and symptoms consistent of acute lower back pain with mobility deficits  Patient presents with pain, decreased lumbar spine ROM, joint mobility restrictions, weakness and poor motor control  Due to these impairments, Patient has difficulty performing ambulating, prolonged sitting, prolonged standing, squatting and lifting   Patient would benefit from skilled physical therapy to address the impairments, improve their level of function, and to improve their overall quality of life     Primary movement impairments:   1)Decrease L/S ROM (pain at end range- all directions)  2)Decreased L/S joint mobility  3)Poor gluteal firing R worse than L    Impairments: abnormal muscle firing, abnormal or restricted ROM, activity intolerance, impaired physical strength, lacks appropriate home exercise program and pain with function  Understanding of Dx/Px/POC: good   Prognosis: good    Goals  STG: To be achieved in 4 -6 weeks  1)Improve lumbar spine ROM by 25%   2)Reduce pain by 50%  3)Improve flexibility to mild restrictions  4)Improve strength in lower extremity by 1/2 MMT    LTG: To be achieved at Discharge  1)Patient is independent with HEP  2)Improve FOTO to greater than or equal to expected  3)Improve tolerance to prolonged sitting, standing, and walking to prior level of function      Plan  Patient would benefit from: PT eval and skilled physical therapy  Planned modality interventions: low level laser therapy  Planned therapy interventions: joint mobilization, manual therapy, neuromuscular re-education, patient education, therapeutic exercise, therapeutic activities and home exercise program  Frequency: 2x per week for 4-6 weeks  Treatment plan discussed with: patient        Subjective Evaluation    History of Present Illness  Mechanism of injury: History of Current Injury: Pt referred to PT from PCP due to acute right knee pain and acute lower back pain  Pt more concerned with her lower back issue at this time  Pt started to experience pain after falling on April 14th at [facility]  She went to [facility] ED for a work up on 5/1 and received XR of the right knee and L-Spine with results below  Pt denies any buckling of the right LE but does report weakness in RLE at the knee  Pt does state that her knee had a contusion  Pain location/Descriptors: P1: Centralized lower back pain more toward the R side  Pain is non-radiating  P2: R posterior and anterior knee pain   Describes knee as "\"tight  \"  Aggravating factors: Prolonged standing, prolonged walking, lifting, Lying  Easing factors:   24 HR pattern:   Imaging: XR impression of right knee: No acute osseous pathology  L-spine XR: Scoliosis and fact arthropathy   Special Questions: Denies any numbness/tingling in the extremities, saddle anesthesia, or B&B issues  Patient goals: Alleviate pain   Hobbies/Interest: Exercises regularly and walks  Occupation: Business/Sales: frequently travels  Pain  Pain scale: L/S: Sore ache (4/10) : Knee: mild discomfort   Pain scale at highest: L/S: 10/10  R knee: Moderately tight and sore  Diagnostic Tests  X-ray: normal  Treatments  No previous or current treatments  Patient Goals  Patient goals for therapy: increased strength, decreased pain, increased motion and independence with ADLs/IADLs          Objective     Neurological Testing     Sensation     Lumbar   Left   Intact: light touch    Right   Intact: light touch    Reflexes   Left   Patellar (L4): normal (2+)  Achilles (S1): normal (2+)  Clonus sign: negative    Right   Patellar (L4): normal (2+)  Achilles (S1): normal (2+)  Clonus sign: negative    Active Range of Motion     Lumbar   Flexion: 105 degrees  with pain  Extension: 35 degrees  with pain  Left lateral flexion: 50 degrees       Right lateral flexion: 20 degrees  with pain  Left rotation:  [facility]  Right rotation:  [facility]    Additional Active Range of Motion Details  *Pain toward end range motion in all planes     *No radicular symptoms   *Repeated extension: pain at end range     Joint Play     Hypomobile: L1, L2, L3, L4 and L5     Pain: L1, L2, L3, L4 and L5     Strength/Myotome Testing     Left Hip   Planes of Motion   Flexion: 5  Extension: 4-  Abduction: 4+  External rotation: 5    Right Hip   Planes of Motion   Flexion: 4  Extension: 4-  Abduction: 4-  External rotation: 4+    Left Knee   Flexion: 5  Extension: 4+    Right Knee   Flexion: 5  Extension: 4+    Left Ankle/Foot " Dorsiflexion: 5  Plantar flexion: 5  Great toe extension: 5    Right Ankle/Foot   Dorsiflexion: 5  Plantar flexion: 5  Great toe extension: 5    Tests     Lumbar     Left   Positive quadrant  Negative crossed SLR, femoral stretch and passive SLR  Right   Positive quadrant  Negative crossed SLR, femoral stretch and passive SLR  Left Hip   Negative DEMETRIO and FADIR  Right Hip   Negative DEMETRIO and FADIR  Right Knee   Negative anterior drawer, anterior Lachman, posterior drawer, valgus stress test at 0 degrees, valgus stress test at 30 degrees, varus stress test at 0 degrees and varus stress test at 30 degrees  Additional Tests Details  *Pain with quadrant testing  *Increased tone with L/S paraspinals  *Decreased joint mobility in L/S with  (throughout)   *Normal hamstring length and SLR   *Mild tenderness to lateral knee of tibial tubercle  *Full flexion pain free  *Full extension pain free  *No joint effusion             Precautions: A-fib, Labral repair 2009, SVT, s/p L shoulder arthroscopy 9/30, Superficial Blood clot       Manuals 5/23            CPA L/S             B UPA L/S             [place]  Gr III                         Neuro Re-Ed             Bridge              SLR Abd TrA             Clam shell             Prone hip extensoin             Side bridge                                        Ther Ex             Elliptical              Side glides             LTR                                                                              Ther Activity             Prone planks                          Gait Training                                       Modalities                                             Visit  EDMdesigner  Access Code: [email or web address]

## 2023-05-23 NOTE — PROGRESS NOTES
PT Evaluation     Today's date: 2023  Patient name: Nirali Monroe  : 1970  MRN: [id number]  Referring provider: Hilario Pruett DO  Dx:   Encounter Diagnosis     ICD-10-CM    1  Acute bilateral low back pain without sciatica  M54 50                      Assessment  Assessment details: Nirali Mornoe is a 47 y o  female who presents with signs and symptoms consistent of acute lower back pain with mobility deficits  Patient presents with pain, decreased lumbar spine ROM, joint mobility restrictions, weakness and poor motor control  Due to these impairments, Patient has difficulty performing ambulating, prolonged sitting, prolonged standing, squatting and lifting  Patient would benefit from skilled physical therapy to address the impairments, improve their level of function, and to improve their overall quality of life      Primary movement impairments:   1)Decrease L/S ROM (pain at end range- all directions)  2)Decreased L/S joint mobility  3)Poor gluteal firing R worse than L    Impairments: abnormal muscle firing, abnormal or restricted ROM, activity intolerance, impaired physical strength, lacks appropriate home exercise program and pain with function  Understanding of Dx/Px/POC: good   Prognosis: good    Goals  STG: To be achieved in 4 -6 weeks  1)Improve lumbar spine ROM by 25%   2)Reduce pain by 50%  3)Improve flexibility to mild restrictions  4)Improve strength in lower extremity by 1/2 MMT    LTG: To be achieved at Discharge  1)Patient is independent with HEP  2)Improve FOTO to greater than or equal to expected  3)Improve tolerance to prolonged sitting, standing, and walking to prior level of function      Plan  Patient would benefit from: PT eval and skilled physical therapy  Planned modality interventions: low level laser therapy  Planned therapy interventions: joint mobilization, manual therapy, neuromuscular re-education, patient education, therapeutic exercise, therapeutic activities and home "exercise program  Frequency: 2x per week for 4-6 weeks  Treatment plan discussed with: patient        Subjective Evaluation    History of Present Illness  Mechanism of injury: History of Current Injury: Pt referred to PT from PCP due to acute right knee pain and acute lower back pain  Pt more concerned with her lower back issue at this time  Pt started to experience pain after falling on April 14th at [facility]  She went to [facility] ED for a work up on 5/1 and received XR of the right knee and L-Spine with results below  Pt denies any buckling of the right LE but does report weakness in RLE at the knee  Pt does state that her knee had a contusion  Pain location/Descriptors: P1: Centralized lower back pain more toward the R side  Pain is non-radiating  P2: R posterior and anterior knee pain  Describes knee as \"tight  \"  Aggravating factors: Prolonged standing, prolonged walking, lifting, Lying  Easing factors:   24 HR pattern:   Imaging: XR impression of right knee: No acute osseous pathology  L-spine XR: Scoliosis and fact arthropathy   Special Questions: Denies any numbness/tingling in the extremities, saddle anesthesia, or B&B issues  Patient goals: Alleviate pain   Hobbies/Interest: Exercises regularly and walks  Occupation: Business/Sales: frequently travels  Pain  Pain scale: L/S: Sore ache (4/10) : Knee: mild discomfort   Pain scale at highest: L/S: 10/10  R knee: Moderately tight and sore        Diagnostic Tests  X-ray: normal  Treatments  No previous or current treatments  Patient Goals  Patient goals for therapy: increased strength, decreased pain, increased motion and independence with ADLs/IADLs          Objective     Neurological Testing     Sensation     Lumbar   Left   Intact: light touch    Right   Intact: light touch    Reflexes   Left   Patellar (L4): normal (2+)  Achilles (S1): normal (2+)  Clonus sign: negative    Right   Patellar (L4): normal (2+)  Achilles (S1): normal (2+)  Clonus sign: " negative    Active Range of Motion     Lumbar   Flexion: 105 degrees  with pain  Extension: 35 degrees  with pain  Left lateral flexion: 50 degrees       Right lateral flexion: 20 degrees  with pain  Left rotation:  WFL  Right rotation:  [facility]    Additional Active Range of Motion Details  *Pain toward end range motion in all planes  *No radicular symptoms   *Repeated extension: pain at end range     Joint Play     Hypomobile: L1, L2, L3, L4 and L5     Pain: L1, L2, L3, L4 and L5     Strength/Myotome Testing     Left Hip   Planes of Motion   Flexion: 5  Extension: 4-  Abduction: 4+  External rotation: 5    Right Hip   Planes of Motion   Flexion: 4  Extension: 4-  Abduction: 4-  External rotation: 4+    Left Knee   Flexion: 5  Extension: 4+    Right Knee   Flexion: 5  Extension: 4+    Left Ankle/Foot   Dorsiflexion: 5  Plantar flexion: 5  Great toe extension: 5    Right Ankle/Foot   Dorsiflexion: 5  Plantar flexion: 5  Great toe extension: 5    Tests     Lumbar     Left   Positive quadrant  Negative crossed SLR, femoral stretch and passive SLR  Right   Positive quadrant  Negative crossed SLR, femoral stretch and passive SLR  Left Hip   Negative DEMETRIO and FADIR  Right Hip   Negative DEMETRIO and FADIR  Right Knee   Negative anterior drawer, anterior Lachman, posterior drawer, valgus stress test at 0 degrees, valgus stress test at 30 degrees, varus stress test at 0 degrees and varus stress test at 30 degrees       Additional Tests Details  *Pain with quadrant testing  *Increased tone with L/S paraspinals  *Decreased joint mobility in L/S with  (throughout)   *Normal hamstring length and SLR   *Mild tenderness to lateral knee of tibial tubercle  *Full flexion pain free  *Full extension pain free  *No joint effusion              Precautions: A-fib, Labral repair 2009, SVT, s/p L shoulder arthroscopy 9/30, Superficial Blood clot       Manuals 5/23            CPA L/S             B UPA L/S Neutral Gap  Gr III                         Neuro Re-Ed             Bridge              SLR Abd TrA             Clam shell             Prone hip extensoin             Side bridge                                        Ther Ex             Elliptical              Side glides             LTR                                                                              Ther Activity             Prone planks                          Gait Training                                       Modalities                                              Visit  [employer]  Access Code: [email or web address]

## 2023-05-26 ENCOUNTER — OFFICE VISIT (OUTPATIENT)
Dept: PHYSICAL THERAPY | Facility: CLINIC | Age: 53
End: 2023-05-26

## 2023-05-26 DIAGNOSIS — M54.50 ACUTE BILATERAL LOW BACK PAIN WITHOUT SCIATICA: Primary | ICD-10-CM

## 2023-05-26 DIAGNOSIS — I82.611 SUPERFICIAL VENOUS THROMBOSIS OF ARM, RIGHT: ICD-10-CM

## 2023-05-26 NOTE — PROGRESS NOTES
"Daily Note     Today's date: 2023  Patient name: Kerrie Daniels  : 1970  MRN: [id number]  Referring provider: Victor Cogan, DO  Dx:   Encounter Diagnosis     ICD-10-CM    1  Acute bilateral low back pain without sciatica  M54 50       2  Superficial venous thrombosis of arm, right  [id number] [id number] lower limb venous duplex study, unilateral/limited                     Subjective: Pt offers no new complaints  Objective: See treatment diary below      Assessment: Initiated POC on impairments identified at evaluation  Tolerated treatment well  Improved motor control of gluteals after MT and stretching  Patient exhibited good technique with therapeutic exercises and would benefit from continued PT  Plan: Continue per plan of care        Precautions: A-fib, Labral repair , SVT, s/p L shoulder arthroscopy , Superficial Blood clot       Manuals            CPA L/S  Gr III 8'           B UPA L/S             Neutral Gap  Gr III                         Neuro Re-Ed             NIKE with march  3x5           SLR Abd TrA             Clam shell             Prone hip extension  10x on ea           Side bridge              Quad hip extension                          Ther Ex             Elliptical  5' 5'           Side glides  10x on ea           LTR c/ PB  20x ea           Press up  2x10           Prayer stretch   10x10\"                                                   Ther Activity             Prone planks                          Gait Training                                       Modalities                                       1:1 with -9a     "

## 2023-05-30 ENCOUNTER — APPOINTMENT (OUTPATIENT)
Dept: PHYSICAL THERAPY | Facility: CLINIC | Age: 53
End: 2023-05-30
Payer: COMMERCIAL

## 2023-06-05 ENCOUNTER — OFFICE VISIT (OUTPATIENT)
Dept: PHYSICAL THERAPY | Facility: CLINIC | Age: 53
End: 2023-06-05
Payer: COMMERCIAL

## 2023-06-05 DIAGNOSIS — M54.50 ACUTE BILATERAL LOW BACK PAIN WITHOUT SCIATICA: Primary | ICD-10-CM

## 2023-06-05 PROCEDURE — 97110 THERAPEUTIC EXERCISES: CPT | Performed by: PHYSICAL THERAPIST

## 2023-06-05 PROCEDURE — 97112 NEUROMUSCULAR REEDUCATION: CPT | Performed by: PHYSICAL THERAPIST

## 2023-06-05 PROCEDURE — 97530 THERAPEUTIC ACTIVITIES: CPT | Performed by: PHYSICAL THERAPIST

## 2023-06-05 NOTE — PROGRESS NOTES
"Daily Note     Today's date: 2023  Patient name: Sj Casillas  : 1970  MRN: [id number]  Referring provider: Lalo Paige DO  Dx:   Encounter Diagnosis     ICD-10-CM    1  Acute bilateral low back pain without sciatica  M54 50           Start Time: 174  Stop Time: 183  Total time in clinic (min): 45 minutes    Subjective: Pt went hiking at [address]  She notes left lateral knee pain after walking  Objective: See treatment diary below      Assessment: Tolerated treatment well  Considerable improvement in hip activation and hip extension  Joint mobility improving in L/S with less discomfort  Upper lumbar spine displays more discomfort than lower lumbar spine today  Progressed core stability program today  Patient exhibited good technique with therapeutic exercises and would benefit from continued PT  Updated HEP  Plan: Continue per plan of care        Precautions: A-fib, Labral repair , SVT, s/p L shoulder arthroscopy , Superficial Blood clot       Manuals  6          CPA L/S  Gr III 8' Gr III 8'           B UPA L/S             Neutral Gap  Gr III                         Neuro Re-Ed             Bridge   10x 10x          Bridge with march  3x5 3x5          SLR Abd TrA             Clam shell   btb 2x10          Prone hip extension  10x on ea 2x10 ea           Side bridge              Quad hip extension                          Ther Ex             Elliptical  5' 5' 5'          Side glides  10x on ea 10x on ea          LTR c/ PB  20x ea           Press up  2x10 2x10          Prayer stretch   10x10\"  10x10\"                                                 Ther Activity             Prone planks   10x5\"          Anti-rotation press             Gait Training                                       Modalities                                       1:1 with -630pm      "

## 2023-06-08 ENCOUNTER — OFFICE VISIT (OUTPATIENT)
Dept: PHYSICAL THERAPY | Facility: CLINIC | Age: 53
End: 2023-06-08
Payer: COMMERCIAL

## 2023-06-08 DIAGNOSIS — M54.50 ACUTE BILATERAL LOW BACK PAIN WITHOUT SCIATICA: Primary | ICD-10-CM

## 2023-06-08 PROCEDURE — 97530 THERAPEUTIC ACTIVITIES: CPT | Performed by: PHYSICAL THERAPIST

## 2023-06-08 PROCEDURE — 97110 THERAPEUTIC EXERCISES: CPT | Performed by: PHYSICAL THERAPIST

## 2023-06-08 PROCEDURE — 97112 NEUROMUSCULAR REEDUCATION: CPT | Performed by: PHYSICAL THERAPIST

## 2023-06-08 NOTE — PROGRESS NOTES
"Daily Note     Today's date: 2023  Patient name: Jo Darling  : 1970  MRN: [id number]  Referring provider: Tony Bear DO  Dx:   Encounter Diagnosis     ICD-10-CM    1  Acute bilateral low back pain without sciatica  M54 50           Start Time: 1620  Stop Time: 1701  Total time in clinic (min): 41 minutes    Subjective: Pt reports more left sided lower back today  She note more symptoms after Monday's session  However, symptoms only lasted into half of Tuesday  Objective: See treatment diary below      Assessment: Patient likely experienced delayed onset muscle soreness after Monday's PT session  Today, increased tone and hypomobility present in left side of lumbar spine  This was modulated with MT  Tolerated treatment fair  Continued with mobility and gradual loading Patient would benefit from continued PT  Implemented hip hinge to improve hip/pelvis and LBP dissociation  Plan: Continue per plan of care        Precautions: A-fib, Labral repair , SVT, s/p L shoulder arthroscopy , Superficial Blood clot       Manuals          CPA L/S  Gr III 8' Gr III 8'  Gr III 8'          B UPA L/S             Neutral Gap  Gr III   Uncomfortable                      Neuro Re-Ed             Bridge   10x 10x 20x         Bridge with march  3x5 3x5 2x5         SLR Abd TrA             Clam shell   btb 2x10          Prone hip extension  10x on ea 2x10 ea           Side bridge              Quad hip extension             Prone alt UE/LE lifts     2x10         Ther Ex             Elliptical  5' 5' 5' 10'         Side glides  10x on ea 10x on ea          LTR c/ PB  20x ea  20x         Press up  2x10 2x10 2x10         Prayer stretch   10x10\"  10x10\" 10x10\"                                                Ther Activity             Prone planks   10x5\"          Anti-rotation press    10x blk tb          Hip hinge with dowel    15x with dowel and cueing          Gait Training                   " Modalities                                       1:1 with -086SL

## 2023-06-12 ENCOUNTER — OFFICE VISIT (OUTPATIENT)
Dept: PHYSICAL THERAPY | Facility: CLINIC | Age: 53
End: 2023-06-12
Payer: COMMERCIAL

## 2023-06-12 DIAGNOSIS — M54.50 ACUTE BILATERAL LOW BACK PAIN WITHOUT SCIATICA: Primary | ICD-10-CM

## 2023-06-12 PROCEDURE — 97530 THERAPEUTIC ACTIVITIES: CPT | Performed by: PHYSICAL THERAPIST

## 2023-06-12 PROCEDURE — 97110 THERAPEUTIC EXERCISES: CPT | Performed by: PHYSICAL THERAPIST

## 2023-06-12 PROCEDURE — 97112 NEUROMUSCULAR REEDUCATION: CPT | Performed by: PHYSICAL THERAPIST

## 2023-06-12 NOTE — PROGRESS NOTES
"Daily Note     Today's date: 2023  Patient name: Timothy Hook  : 1970  MRN: [id number]  Referring provider: Richard Ward DO  Dx:   Encounter Diagnosis     ICD-10-CM    1  Acute bilateral low back pain without sciatica  M54 50                      Subjective: Pt offers no new complaint today  Pt has to drive to [place] to work next week  Objective: See treatment diary below      Assessment: Tolerated treatment well  Implemented sciatic nerve glides secondary to adverse neural tension  Pt states that she frequently gets tingling in her feet if she sits too long  Progressed hip hinge to RDL with KB  Patient demonstrated fatigue post treatment, exhibited good technique with therapeutic exercises and would benefit from continued PT  Plan: Continue per plan of care        Precautions: A-fib, Labral repair , SVT, s/p L shoulder arthroscopy , Superficial Blood clot       Manuals         CPA L/S  Gr III 8' Gr III 8'  Gr III 8'  Gr III 8'         B UPA L/S             Neutral Gap  Gr III   Uncomfortable                      Neuro Re-Ed             Bridge   10x 10x 20x 2x10        Bridge with march  3x5 3x5 2x5 2x5        SLR Abd TrA             Clam shell   btb 2x10          Prone hip extension  10x on ea 2x10 ea           Side bridge              Quad hip extension             Prone alt UE/LE lifts     2x10         LE lowering from 90/90 positions     10x on ea leg         Sciatic nerve slider     10x on ea LE        Ther Ex             Elliptical  5' 5' 5' 10' 5'        Side glides  10x on ea 10x on ea          LTR c/ PB  20x ea  20x 20x        Press up  2x10 2x10 2x10 2x10        Prayer stretch   10x10\"  10x10\" 10x10\"                                                Ther Activity             Prone planks   10x5\"          Anti-rotation press    10x blk tb  10x ea direction blk tb Stir the pot          Hip hinge with dowel    15x with dowel and cueing  RDLs 15# KB " 2x10        Gait Training                                       Modalities                                       1:1 with -405pm

## 2023-06-14 ENCOUNTER — APPOINTMENT (OUTPATIENT)
Dept: PHYSICAL THERAPY | Facility: CLINIC | Age: 53
End: 2023-06-14
Payer: COMMERCIAL

## 2023-06-15 ENCOUNTER — APPOINTMENT (OUTPATIENT)
Dept: PHYSICAL THERAPY | Facility: CLINIC | Age: 53
End: 2023-06-15
Payer: COMMERCIAL

## 2023-06-15 NOTE — PROGRESS NOTES
PT Evaluation     Today's date: 6/15/2023  Patient name: Moo Garrison  : 1970  MRN: [id number]  Referring provider: Anna Anders DO  Dx: No diagnosis found  Assessment  Assessment details: Moo Garrison is a 46 y o  female who presents with signs and symptoms consistent of ***  Patient presents with {Lumbar spine impairments:35284}  Due to these impairments, Patient has difficulty performing {Functional limitations:39744}  Patient would benefit from skilled physical therapy to address the impairments, improve their level of function, and to improve their overall quality of life  Subjective Evaluation    History of Present Illness  Mechanism of injury: History of Current Injury: Pt is self referred for acute neck pain     Pain location/Descriptors: ***  Aggravating factors: ***  Easing factors: ***  24 HR pattern: ***  Imaging: ***  Special Questions: ***  Patient goals:  ***  Hobbies/Interest: ***  Occupation: ***          Objective           Precautions: A-fib, Labral repair , SVT, s/p L shoulder arthroscopy , Superficial Blood clot       Manuals                                                                 Neuro Re-Ed                                                                                                        Ther Ex                                                                                                                     Ther Activity                                       Gait Training                                       Modalities

## 2023-06-26 ENCOUNTER — OFFICE VISIT (OUTPATIENT)
Dept: PHYSICAL THERAPY | Facility: CLINIC | Age: 53
End: 2023-06-26
Payer: COMMERCIAL

## 2023-06-26 DIAGNOSIS — M54.50 ACUTE BILATERAL LOW BACK PAIN WITHOUT SCIATICA: Primary | ICD-10-CM

## 2023-06-26 PROCEDURE — 97112 NEUROMUSCULAR REEDUCATION: CPT | Performed by: PHYSICAL THERAPIST

## 2023-06-26 PROCEDURE — 97110 THERAPEUTIC EXERCISES: CPT | Performed by: PHYSICAL THERAPIST

## 2023-06-26 PROCEDURE — 97530 THERAPEUTIC ACTIVITIES: CPT | Performed by: PHYSICAL THERAPIST

## 2023-06-26 NOTE — PROGRESS NOTES
Daily Note     Today's date: 2023  Patient name: Danuta Gomez  : 1970  MRN: [id number]  Referring provider: Marah Greenfield DO  Dx:   Encounter Diagnosis     ICD-10-CM    1  Acute bilateral low back pain without sciatica  M54 50                      Subjective: Pt reports having multiple areas of pain today including her lower back, right knee, elbow, and shoulders  However, patient has resumed exercising and lifting weights  She states that her back was more bothersome after walking 45 minutes on her treadmill at 3%  Objective: See treatment diary below  +ttp over distal ITB insertion  +pain with kneeling on R knee    Assessment: Tolerated treatment well   of lumbar spine were mildly limited and painful  Continued with lumbopelvic stabilization and strengthening program with good tolerance  Multiple areas of increased pain are likely a result of DOMS from initiating an exercise program   Implemented lateral walks for hip condition  Patient would benefit from continued PT  Plan: Continue per plan of care  Pt will be re-evaluated next session        Precautions: A-fib, Labral repair , SVT, s/p L shoulder arthroscopy , Superficial Blood clot       Manuals        CPA L/S  Gr III 8' Gr III 8'  Gr III 8'  Gr III 8'  Gr III 8       B UPA L/S             Neutral Gap  Gr III   Uncomfortable                      Neuro Re-Ed             Bridge   10x 10x 20x 2x10 2x10       Bridge with march  3x5 3x5 2x5 2x5 10x       SLR Abd TrA             Clam shell   btb 2x10          Prone hip extension  10x on ea 2x10 ea           Side bridge              Quad hip extension             Prone alt UE/LE lifts     2x10         LE lowering from 90/90 positions     10x on ea leg  10x on ea leg        Sciatic nerve slider     10x on ea LE        Ther Ex             Elliptical  5' 5' 5' 10' 5' 5'       Side glides  10x on ea 10x on ea          LTR c/ PB  20x ea  20x 20x 10x "  Press up  2x10 2x10 2x10 2x10 2x10       Prayer stretch   10x10\"  10x10\" 10x10\"                                                Ther Activity             Prone planks   10x5\"          Anti-rotation press    10x blk tb  10x ea direction blk tb Stir the pot   10x ea direction blk tb Stir the pot        Hip hinge with dowel    15x with dowel and cueing  RDLs 15# KB 2x10 RDLs 15# KB 2x10       Lateral walks      4x20 ft rtb        Gait Training                                       Modalities                                         "

## 2023-06-29 ENCOUNTER — EVALUATION (OUTPATIENT)
Dept: PHYSICAL THERAPY | Facility: CLINIC | Age: 53
End: 2023-06-29
Payer: COMMERCIAL

## 2023-06-29 DIAGNOSIS — M54.50 ACUTE BILATERAL LOW BACK PAIN WITHOUT SCIATICA: Primary | ICD-10-CM

## 2023-06-29 PROCEDURE — 97140 MANUAL THERAPY 1/> REGIONS: CPT | Performed by: PHYSICAL THERAPIST

## 2023-06-29 PROCEDURE — 97110 THERAPEUTIC EXERCISES: CPT | Performed by: PHYSICAL THERAPIST

## 2023-06-29 NOTE — LETTER
2023    [address] [facility]    Patient: Radha Darling   YOB: 1970   Date of Visit: 2023     Encounter Diagnosis     ICD-10-CM    1  Acute bilateral low back pain without sciatica  M54 50           Dear Dr Barrientos Patience: Thank you for your recent referral of Radha Darling  Please review the attached evaluation summary from Milly's recent visit  Please verify that you agree with the plan of care by signing the attached order  If you have any questions or concerns, please do not hesitate to call  I sincerely appreciate the opportunity to share in the care of one of your patients and hope to have another opportunity to work with you in the near future  Sincerely,    Sapna Villagomez, PT      Referring Provider:      I certify that I have read the below Plan of Care and certify the need for these services furnished under this plan of treatment while under my care  Hugo Roth DO  [address] [facility]  [address] [place] [address]  Via Fax: [phone]          [id number]      Today's date: 2023  Patient name: Radha Darling  : 1970  MRN: [id number]  Referring provider: Yonas De La Cruz DO  Dx:   Encounter Diagnosis     ICD-10-CM    1  Acute bilateral low back pain without sciatica  M54 50                    Assessment  Assessment details: Radha Darling is a 47 y o  female who presents with signs and symptoms consistent of acute lower back pain with mobility deficits  Overall, patient lower back pain is gradually improving  Some days are worse than others, according to patient  Upon assessment, patient has demonstrated improved L/S ROM, improved joint mobility, decrease tenderness throughout the lumbar spine, and improved strength of bilateral hips   Although patient continues to experience intermittent pain in her lumbar spine, her knee and elbow symptoms seem to be more bothersome at the moment  Pt's right knee was re-assessed today  She has moderate tenderness present to the distal ITB, lateral joint line, and lateral retinaculum of her right knee  All ligamentous and meniscal testing are negative  Pt's pain primary occurs with kneeling and weight bearing flexion activities  Pt's right knee strength is mildly weak upon functional squat assessment and single leg squat (decreased eccentric control)  At this time, we will focus intervention on her right knee and hold on her lumbar spine   Thank you for this referral       Primary movement impairments of the right knee:  1)Decreased strength of right quadriceps  2)Tenderness to lateral joint line     Primary movement impairments:   1)Decrease L/S ROM (pain at end range- all directions)-Improved  2)Decreased L/S joint mobility-Improved  3)Poor gluteal firing R worse than L-Improved    Impairments: abnormal muscle firing, abnormal or restricted ROM, activity intolerance, impaired physical strength, lacks appropriate home exercise program and pain with function  Understanding of Dx/Px/POC: good   Prognosis: good    Goals  STG: To be achieved in 4 -6 weeks  1)Improve lumbar spine ROM by 25%-MET   2)Reduce pain by 50%-MET  3)Improve flexibility to mild restrictions-MET  4)Improve strength in lower extremity by 1/2 MMT-MET  5)Improve right quadriceps strength to 5/5   6)Reduce tenderness to lateral joint line of right knee by 50%    LTG: To be achieved at Discharge  1)Patient is independent with HEP-MET  2)Improve FOTO to greater than or equal to expected-MET  3)Improve tolerance to prolonged sitting, standing, and walking to prior level of function-Partially met (continue for R knee)       Plan  Patient would benefit from: PT eval and skilled physical therapy  Planned modality interventions: low level laser therapy  Planned therapy interventions: joint mobilization, manual therapy, neuromuscular re-education, patient education, therapeutic "exercise, therapeutic activities and home exercise program  Frequency: 2x per week for 4-6 weeks  Treatment plan discussed with: patient        Subjective Evaluation    History of Present Illness  Mechanism of injury: History of Current Injury: Pt referred to PT from PCP due to acute right knee pain and acute lower back pain  Pt more concerned with her lower back issue at this time  Pt started to experience pain after falling on April 14th at [facility]  She went to [facility] ED for a work up on 5/1 and received XR of the right knee and L-Spine with results below  Pt denies any buckling of the right LE but does report weakness in RLE at the knee  Pt does state that her knee had a contusion  Pain location/Descriptors: P1: Centralized lower back pain more toward the R side  Pain is non-radiating  P2: R posterior and anterior knee pain  Describes knee as \"tight  \"  Aggravating factors: Prolonged standing, prolonged walking, lifting, Lying  Easing factors:   24 HR pattern:   Imaging: XR impression of right knee: No acute osseous pathology  L-spine XR: Scoliosis and fact arthropathy   Special Questions: Denies any numbness/tingling in the extremities, saddle anesthesia, or B&B issues  Patient goals: Alleviate pain   Hobbies/Interest: Exercises regularly and walks  Occupation: Business/Sales: frequently travels  Pain  Pain scale: L/S: Sore ache (mild) : Knee: mild discomfort   Pain scale at highest: L/S: mild  R knee: Moderately tight and sore        Diagnostic Tests  X-ray: normal  Treatments  No previous or current treatments  Patient Goals  Patient goals for therapy: increased strength, decreased pain, increased motion and independence with ADLs/IADLs          Objective     Neurological Testing     Sensation     Lumbar   Left   Intact: light touch    Right   Intact: light touch    Reflexes   Left   Patellar (L4): normal (2+)  Achilles (S1): normal (2+)  Clonus sign: negative    Right   Patellar (L4): normal " (2+)  Achilles (S1): normal (2+)  Clonus sign: negative    Active Range of Motion     Lumbar   Flexion: 120 degrees    Extension: 50 degrees    Left lateral flexion: 50 degrees       Right lateral flexion: 50 degrees  with pain  Left rotation:  WFL  Right rotation:  [facility]    Additional Active Range of Motion Details  *Pain toward end range motion in all planes  -Now WNL  *No radicular symptoms   *Repeated extension: pain at end range     Joint Play     Hypomobile: L1, L2, L3, L4 and L5 -Improved    Pain: L1, L2, L3, L4 and L5 -Not pain     Strength/Myotome Testing     Left Hip   Planes of Motion   Flexion: 5  Extension: 4+  Abduction: 4+  External rotation: 5    Right Hip   Planes of Motion   Flexion: 5  Extension: 4+  Abduction: 4  External rotation: 5    Left Knee   Flexion: 5  Extension: 5    Right Knee   Flexion: 5  Extension: 4    Left Ankle/Foot   Dorsiflexion: 5  Plantar flexion: 5  Great toe extension: 5    Right Ankle/Foot   Dorsiflexion: 5  Plantar flexion: 5  Great toe extension: 5    Tests     Lumbar     Left   Positive quadrant  Negative crossed SLR, femoral stretch and passive SLR  Right   Positive quadrant  Negative crossed SLR, femoral stretch and passive SLR  Left Hip   Negative DEMETRIO and FADIR  Right Hip   Negative DEMETRIO and FADIR  Right Knee   Negative anterior drawer, anterior Lachman, posterior drawer, valgus stress test at 0 degrees, valgus stress test at 30 degrees, varus stress test at 0 degrees and varus stress test at 30 degrees       Additional Tests Details  *Pain with quadrant testing-Negative  *Increased tone with L/S paraspinals-WNL  *Decreased joint mobility in L/S with  (throughout) -WNL  *Normal hamstring length and SLR : Improved   *Quadriceps flexibility: WNL   *Moderate tenderness to lateral knee of tibial tubercle  *Full flexion pain free  *Full extension pain free  *No joint effusion   *Functional squat: Shift to the left  *Single leg squat: pain, valgus, "decreased control on R vs L         Precautions: A-fib, Labral repair 2009, SVT, s/p L shoulder arthroscopy 9/30, Superficial Blood clot     * HOLD L/S intervention  Initiate knee next session  *  Manuals 5/23 5/26 6/5 6/8 6/12 6/26 6/29      CPA L/S  Gr III 8' Gr III 8'  Gr III 8'  Gr III 8'  Gr III 8       B UPA L/S             Neutral Gap  Gr III   Uncomfortable         IASTM using LLL       nv to lateral joint line and ITB       Neuro Re-Ed             Bridge   10x 10x 20x 2x10 2x10       Bridge with march  3x5 3x5 2x5 2x5 10x       SLR Abd TrA             Clam shell   btb 2x10          Prone hip extension  10x on ea 2x10 ea           Side bridge              Quad hip extension             Prone alt UE/LE lifts     2x10         LE lowering from 90/90 positions     10x on ea leg  10x on ea leg        Sciatic nerve slider     10x on ea LE        Ther Ex             Elliptical  5' 5' 5' 10' 5' 5'       Side glides  10x on ea 10x on ea          LTR c/ PB  20x ea  20x 20x 10x        Press up  2x10 2x10 2x10 2x10 2x10       Prayer stretch   10x10\"  10x10\" 10x10\"         Knee extension machine       nv      Leg curl       nv       SLS on foam              Ther Activity             Prone planks   10x5\"          Anti-rotation press    10x blk tb  10x ea direction blk tb Stir the pot   10x ea direction blk tb Stir the pot        Hip hinge with dowel    15x with dowel and cueing  RDLs 15# KB 2x10 RDLs 15# KB 2x10       Lateral walks      4x20 ft rtb        Leg press       nv      RDLs       nv      Squat with hip abd       nv      TRX reverse lunge        nv      Gait Training                                       Modalities                                       1:1 with PT from 6-630  Pt was re-evaluated today                  "

## 2023-06-29 NOTE — PROGRESS NOTES
[id number]      Today's date: 2023  Patient name: Salas Root  : 1970  MRN: [id number]  Referring provider: Dessie Hodgkin, DO  Dx:   Encounter Diagnosis     ICD-10-CM    1  Acute bilateral low back pain without sciatica  M54 50                    Assessment  Assessment details: Salas Root is a 47 y o  female who presents with signs and symptoms consistent of acute lower back pain with mobility deficits  Overall, patient lower back pain is gradually improving  Some days are worse than others, according to patient  Upon assessment, patient has demonstrated improved L/S ROM, improved joint mobility, decrease tenderness throughout the lumbar spine, and improved strength of bilateral hips  Although patient continues to experience intermittent pain in her lumbar spine, her knee and elbow symptoms seem to be more bothersome at the moment  Pt's right knee was re-assessed today  She has moderate tenderness present to the distal ITB, lateral joint line, and lateral retinaculum of her right knee  All ligamentous and meniscal testing are negative  Pt's pain primary occurs with kneeling and weight bearing flexion activities  Pt's right knee strength is mildly weak upon functional squat assessment and single leg squat (decreased eccentric control)  At this time, we will focus intervention on her right knee and hold on her lumbar spine   Thank you for this referral       Primary movement impairments of the right knee:  1)Decreased strength of right quadriceps  2)Tenderness to lateral joint line     Primary movement impairments:   1)Decrease L/S ROM (pain at end range- all directions)-Improved  2)Decreased L/S joint mobility-Improved  3)Poor gluteal firing R worse than L-Improved    Impairments: abnormal muscle firing, abnormal or restricted ROM, activity intolerance, impaired physical strength, lacks appropriate home exercise program and pain with function  Understanding of Dx/Px/POC: good   Prognosis: "good    Goals  STG: To be achieved in 4 -6 weeks  1)Improve lumbar spine ROM by 25%-MET   2)Reduce pain by 50%-MET  3)Improve flexibility to mild restrictions-MET  4)Improve strength in lower extremity by 1/2 MMT-MET  5)Improve right quadriceps strength to 5/5   6)Reduce tenderness to lateral joint line of right knee by 50%    LTG: To be achieved at Discharge  1)Patient is independent with HEP-MET  2)Improve FOTO to greater than or equal to expected-MET  3)Improve tolerance to prolonged sitting, standing, and walking to prior level of function-Partially met (continue for R knee)       Plan  Patient would benefit from: PT eval and skilled physical therapy  Planned modality interventions: low level laser therapy  Planned therapy interventions: joint mobilization, manual therapy, neuromuscular re-education, patient education, therapeutic exercise, therapeutic activities and home exercise program  Frequency: 2x per week for 4-6 weeks  Treatment plan discussed with: patient        Subjective Evaluation    History of Present Illness  Mechanism of injury: History of Current Injury: Pt referred to PT from PCP due to acute right knee pain and acute lower back pain  Pt more concerned with her lower back issue at this time  Pt started to experience pain after falling on April 14th at [facility]  She went to [facility] ED for a work up on 5/1 and received XR of the right knee and L-Spine with results below  Pt denies any buckling of the right LE but does report weakness in RLE at the knee  Pt does state that her knee had a contusion  Pain location/Descriptors: P1: Centralized lower back pain more toward the R side  Pain is non-radiating  P2: R posterior and anterior knee pain  Describes knee as \"tight  \"  Aggravating factors: Prolonged standing, prolonged walking, lifting, Lying  Easing factors:   24 HR pattern:   Imaging: XR impression of right knee: No acute osseous pathology   L-spine XR: Scoliosis and fact arthropathy   Special " Questions: Denies any numbness/tingling in the extremities, saddle anesthesia, or B&B issues  Patient goals: Alleviate pain   Hobbies/Interest: Exercises regularly and walks  Occupation: Business/Sales: frequently travels  Pain  Pain scale: L/S: Sore ache (mild) : Knee: mild discomfort   Pain scale at highest: L/S: mild  R knee: Moderately tight and sore  Diagnostic Tests  X-ray: normal  Treatments  No previous or current treatments  Patient Goals  Patient goals for therapy: increased strength, decreased pain, increased motion and independence with ADLs/IADLs          Objective     Neurological Testing     Sensation     Lumbar   Left   Intact: light touch    Right   Intact: light touch    Reflexes   Left   Patellar (L4): normal (2+)  Achilles (S1): normal (2+)  Clonus sign: negative    Right   Patellar (L4): normal (2+)  Achilles (S1): normal (2+)  Clonus sign: negative    Active Range of Motion     Lumbar   Flexion: 120 degrees    Extension: 50 degrees    Left lateral flexion: 50 degrees       Right lateral flexion: 50 degrees  with pain  Left rotation:  WFL  Right rotation:  [facility]    Additional Active Range of Motion Details  *Pain toward end range motion in all planes  -Now WNL  *No radicular symptoms   *Repeated extension: pain at end range     Joint Play     Hypomobile: L1, L2, L3, L4 and L5 -Improved    Pain: L1, L2, L3, L4 and L5 -Not pain     Strength/Myotome Testing     Left Hip   Planes of Motion   Flexion: 5  Extension: 4+  Abduction: 4+  External rotation: 5    Right Hip   Planes of Motion   Flexion: 5  Extension: 4+  Abduction: 4  External rotation: 5    Left Knee   Flexion: 5  Extension: 5    Right Knee   Flexion: 5  Extension: 4    Left Ankle/Foot   Dorsiflexion: 5  Plantar flexion: 5  Great toe extension: 5    Right Ankle/Foot   Dorsiflexion: 5  Plantar flexion: 5  Great toe extension: 5    Tests     Lumbar     Left   Positive quadrant     Negative crossed SLR, femoral stretch and passive "SLR      Right   Positive quadrant  Negative crossed SLR, femoral stretch and passive SLR  Left Hip   Negative DEMETRIO and FADIR  Right Hip   Negative DEMETRIO and FADIR  Right Knee   Negative anterior drawer, anterior Lachman, posterior drawer, valgus stress test at 0 degrees, valgus stress test at 30 degrees, varus stress test at 0 degrees and varus stress test at 30 degrees  Additional Tests Details  *Pain with quadrant testing-Negative  *Increased tone with L/S paraspinals-WNL  *Decreased joint mobility in L/S with  (throughout) -WNL  *Normal hamstring length and SLR : Improved   *Quadriceps flexibility: WNL   *Moderate tenderness to lateral knee of tibial tubercle  *Full flexion pain free  *Full extension pain free  *No joint effusion   *Functional squat: Shift to the left  *Single leg squat: pain, valgus, decreased control on R vs L          Precautions: A-fib, Labral repair 2009, SVT, s/p L shoulder arthroscopy 9/30, Superficial Blood clot     * HOLD L/S intervention  Initiate knee next session  *  Manuals 5/23 5/26 6/5 6/8 6/12 6/26 6/29      CPA L/S  Gr III 8' Gr III 8'  Gr III 8'  Gr III 8'  Gr III 8       B UPA L/S             Neutral Gap  Gr III   Uncomfortable         IASTM using LLL       nv to lateral joint line and ITB       Neuro Re-Ed             Bridge   10x 10x 20x 2x10 2x10       Bridge with march  3x5 3x5 2x5 2x5 10x       SLR Abd TrA             Clam shell   btb 2x10          Prone hip extension  10x on ea 2x10 ea           Side bridge              Quad hip extension             Prone alt UE/LE lifts     2x10         LE lowering from 90/90 positions     10x on ea leg  10x on ea leg        Sciatic nerve slider     10x on ea LE        Ther Ex             Elliptical  5' 5' 5' 10' 5' 5'       Side glides  10x on ea 10x on ea          LTR c/ PB  20x ea  20x 20x 10x        Press up  2x10 2x10 2x10 2x10 2x10       Prayer stretch   10x10\"  10x10\" 10x10\"         Knee extension machine   " "    nv      Leg curl       nv       SLS on foam              Ther Activity             Prone planks   10x5\"          Anti-rotation press    10x blk tb  10x ea direction blk tb Stir the pot   10x ea direction blk tb Stir the pot        Hip hinge with dowel    15x with dowel and cueing  RDLs 15# KB 2x10 RDLs 15# KB 2x10       Lateral walks      4x20 ft rtb        Leg press       nv      RDLs       nv      Squat with hip abd       nv      TRX reverse lunge        nv      Gait Training                                       Modalities                                       1:1 with PT from 6-630  Pt was re-evaluated today     "

## 2023-07-03 ENCOUNTER — OFFICE VISIT (OUTPATIENT)
Dept: PHYSICAL THERAPY | Facility: CLINIC | Age: 53
End: 2023-07-03
Payer: COMMERCIAL

## 2023-07-03 DIAGNOSIS — M54.50 ACUTE BILATERAL LOW BACK PAIN WITHOUT SCIATICA: Primary | ICD-10-CM

## 2023-07-03 PROCEDURE — 97110 THERAPEUTIC EXERCISES: CPT | Performed by: PHYSICAL THERAPIST

## 2023-07-03 PROCEDURE — 97530 THERAPEUTIC ACTIVITIES: CPT | Performed by: PHYSICAL THERAPIST

## 2023-07-10 ENCOUNTER — APPOINTMENT (OUTPATIENT)
Dept: PHYSICAL THERAPY | Facility: CLINIC | Age: 53
End: 2023-07-10
Payer: COMMERCIAL

## 2023-07-13 ENCOUNTER — OFFICE VISIT (OUTPATIENT)
Dept: PHYSICAL THERAPY | Facility: CLINIC | Age: 53
End: 2023-07-13
Payer: COMMERCIAL

## 2023-07-13 DIAGNOSIS — M54.2 NECK PAIN: Primary | ICD-10-CM

## 2023-07-13 PROCEDURE — 97161 PT EVAL LOW COMPLEX 20 MIN: CPT | Performed by: PHYSICAL THERAPIST

## 2023-07-13 PROCEDURE — 97140 MANUAL THERAPY 1/> REGIONS: CPT | Performed by: PHYSICAL THERAPIST

## 2023-07-13 PROCEDURE — 97110 THERAPEUTIC EXERCISES: CPT | Performed by: PHYSICAL THERAPIST

## 2023-07-13 NOTE — PROGRESS NOTES
PT Evaluation     Today's date: 2023  Patient name: Delfino Laguerre  : 1970  MRN: [id number]  Referring provider: Juan Sheridan DO  Dx:   Encounter Diagnosis     ICD-10-CM    1. Neck pain  M54.2                      Assessment  Assessment details: Delfino Laguerre is a 46 y.o. female who presents with signs and symptoms consistent of neck pain with headaches. Pt requested treatment today on her neck as she was originally schedule to treat her knee but experienced 4 days of a headache. Patient presents with left sided neck pain, moderate trigger points in left upper trap, tenderness throughout left c/s paraspinals/IMELDA, decreased cervical ROM, and decreased c/s joint mobility. Due to these impairments, Patient has been experiencing migraine like symptoms which could be cervicogenic in nature. Recommend patient f/u with PCP if symptoms do not subside. Patient would benefit from skilled physical therapy to address the impairments, improve their level of function, and to improve their overall quality of life. Primary movement impairments:   1)Trigger point in L UT  2)Decreased left cervical glides and rotational glides  3)Decreased L OA joint mobility leading to reduced cervical retraction ROM       Impairments: abnormal muscle firing, abnormal muscle tone, abnormal or restricted ROM and activity intolerance    Goals  Short Term Goals: to be achieved by 4 weeks  1) Patient to be independent with basic HEP. 2) Decrease pain to 3/10 at its worst.  3) Increase cervical spine ROM by 5-10 degrees in all deficient planes. 4) Improve joint mobility in cervical spine to normal     Long Term Goals: to be achieved by discharge  1) Patient to be independent with comprehensive HEP. 2) Cervical spine ROM WNL all planes to improve a/iadls.         Plan  Patient would benefit from: PT eval and skilled physical therapy  Planned modality interventions: low level laser therapy  Planned therapy interventions: joint mobilization, manual therapy, neuromuscular re-education, patient education, therapeutic exercise, therapeutic activities and home exercise program  Frequency: Continue with current plan of care 2x per week for 4 weeks while adding c/s into our plan. Treatment plan discussed with: patient        Subjective Evaluation    History of Present Illness  Mechanism of injury: History of Current Injury: Pt attends PT today with complaints of 4 days of a migraine. She has been having this since she was 24. Pt unable to take medications due to tachycardia. Pt states that it typically starts in her neck on the left side and then radiates to the occipital region. Pt does get tingling in left side of face at times but not motor involvement. Pt notes that she couldn't turn her neck when it first started. Pt has been drinking a lot of water and Gatorade today. Pt reports inconsistent migraines which can occur once per month or once every 5 months. Her last migraine was 3 months ago. Pt has had previous neck pain. She has a history of degenerative changes and bulging discs. She was previously treated by me. Pain location/Descriptors: Left side of the neck with radiates to the occiput. Aggravating factors: weight lifting or exercising  Easing factors: Medication  Imaging: Brain MRI (previously)  Special Questions: Elijah Malik denies a new onset of Dizziness, Dysphagia, Dysarthria, Drop attacks and Diplopia. +Nausea.    Patient goals:  Help neck discomfort and migraine    Patient Goals  Patient goals for therapy: increased motion, independence with ADLs/IADLs and decreased pain    Pain  Current pain ratin  At worst pain rating: 10      Diagnostic Tests  MRI studies: normal  Treatments  Previous treatment: injection treatment and medication        Objective     Active Range of Motion   Cervical/Thoracic Spine       Cervical  Subcranial protraction:  WFL   Subcranial retraction: Active cervical subcranial retraction: Neutral Restriction level: moderate  Flexion: 70 degrees   Extension: 65 degrees      Left lateral flexion: 30 degrees     with pain  Right lateral flexion: 35 degrees     with pain  Left rotation: 70 degrees with pain  Right rotation: 80 degrees             Joint Play     Hypomobile: C1, C2, C3, C4, C5, C6 and C7     Pain: C1, C2, C3, C4, C5, C6 and C7     Tests   Cervical   Negative Lhermitte's sign, alar ligament test, Sharp-Maren test, transverse ligament test and VBI. Left   Negative cervical flexion-rotation test.     Right   Negative cervical flexion-rotation test.     Additional Tests Details  *Moderate trigger point in R upper trap  *Moderate tenderness to L IMELDA  *Decreased C/S retraction  *Decreased OA mobility on the L  *Hypomobile lateral glides and rotational glides on the Left vs Right C2-7   *Left UT flexibility restriction             Precautions: Standard      Manuals 7/13            Manual traction 5'            Left cervical lateral glides Gr III            Left cervical rotational glides Gr III            STM L UT/L paraspinals/L IMELDA 5'            Neuro Re-Ed                                                                                                        Ther Ex             Left OA mobilizations             Cervical SNAG 10x ea            UBE                                                                               Ther Activity                                       Gait Training                                       Modalities              10'                            [email or web address]  Access Code: [email or web address]

## 2023-07-17 ENCOUNTER — APPOINTMENT (OUTPATIENT)
Dept: PHYSICAL THERAPY | Facility: CLINIC | Age: 53
End: 2023-07-17
Payer: COMMERCIAL

## 2023-07-20 ENCOUNTER — OFFICE VISIT (OUTPATIENT)
Dept: PHYSICAL THERAPY | Facility: CLINIC | Age: 53
End: 2023-07-20
Payer: COMMERCIAL

## 2023-07-20 DIAGNOSIS — G89.29 CHRONIC PAIN OF RIGHT KNEE: ICD-10-CM

## 2023-07-20 DIAGNOSIS — M54.50 ACUTE BILATERAL LOW BACK PAIN WITHOUT SCIATICA: Primary | ICD-10-CM

## 2023-07-20 DIAGNOSIS — M25.561 CHRONIC PAIN OF RIGHT KNEE: ICD-10-CM

## 2023-07-20 PROCEDURE — 97110 THERAPEUTIC EXERCISES: CPT | Performed by: PHYSICAL THERAPIST

## 2023-07-20 PROCEDURE — 97530 THERAPEUTIC ACTIVITIES: CPT | Performed by: PHYSICAL THERAPIST

## 2023-07-20 NOTE — PROGRESS NOTES
Daily Note     Today's date: 2023  Patient name: Sandra Astorga  : 1970  MRN: [id number]  Referring provider: Gita Hurst DO  Dx:   Encounter Diagnosis     ICD-10-CM    1. Acute bilateral low back pain without sciatica  M54.50       2. Chronic pain of right knee  M25.561     G89.29                      Subjective: Pt reports no migraine symptoms and neck feeling much better. She would like to focus on knee strengthening today. Objective: See treatment diary below      Assessment: Tolerated treatment well. Patient exhibited good technique with therapeutic exercises and would benefit from continued PT. Plan: Continue per plan of care.       Precautions: Standard    Manuals 5/23 5/26 6/5 6/8 6/12 6/26 6/29 7/3 7/20    CPA L/S  Gr III 8' Gr III 8'  Gr III 8'  Gr III 8'  Gr III 8       B UPA L/S             Neutral Gap  Gr III   Uncomfortable         IASTM using LLL       nv to lateral joint line and ITB  4:20     Neuro Re-Ed             Bridge   10x 10x 20x 2x10 2x10       Bridge with march  3x5 3x5 2x5 2x5 10x       SLR Abd TrA             Clam shell   btb 2x10          Prone hip extension  10x on ea 2x10 ea           Side bridge              Quad hip extension             Prone alt UE/LE lifts     2x10         LE lowering from 90/90 positions     10x on ea leg  10x on ea leg        Sciatic nerve slider     10x on ea LE        TKE at [facility]   3x15 20# B    Ther Ex             Bicycle         7' L 3  8' L3    Elliptical  5' 5' 5' 10' 5' 5'       Side glides  10x on ea 10x on ea          LTR c/ PB  20x ea  20x 20x 10x        Press up  2x10 2x10 2x10 2x10 2x10       Prayer stretch   10x10"  10x10" 10x10"         Knee extension machine       nv 3x10 33# 3x10 33#    Leg curl       nv  3x10 33# 3x10 33#    SLS on foam        nv 3x10" B 5x10" B    Standing hamstring stretch on step        10x10" B  3x30" B     Ther Activity             Prone planks   10x5"          Anti-rotation press 10x blk tb  10x ea direction blk tb Stir the pot   10x ea direction blk tb Stir the pot        Hip hinge with dowel    15x with dowel and cueing  RDLs 15# KB 2x10 RDLs 15# KB 2x10       Lateral walks      4x20 ft rtb        Leg press        nv 3x10 70# 3x10 70#    RDLs       nv 20# KB 3x10  defers    Squat with hip abd       nv      TRX reverse lunge        nv 2x10 3x10                 Gait Training                                       Modalities                                       1:1 with PT from 605-630pm

## 2023-07-21 ENCOUNTER — TELEPHONE (OUTPATIENT)
Dept: HEMATOLOGY ONCOLOGY | Facility: CLINIC | Age: 53
End: 2023-07-21

## 2023-07-21 NOTE — TELEPHONE ENCOUNTER
Appointment Schedule   Who are you speaking with? Patient   If it is not the patient, are they listed on an active communication consent form? Yes   Which provider is the appointment scheduled with? Luis Carlos Lee PA-C   At which location is the appointment scheduled for? [place]   When is the appointment scheduled? Please list date and time 8/4/23  8am   What is the reason for this appointment? Needs follow up due to her blood level    Did patient voice understanding of the details of this appointment? Yes   Was the no show policy reviewed with patient?  Yes

## 2023-07-24 ENCOUNTER — OFFICE VISIT (OUTPATIENT)
Dept: PHYSICAL THERAPY | Facility: CLINIC | Age: 53
End: 2023-07-24
Payer: COMMERCIAL

## 2023-07-24 DIAGNOSIS — G89.29 CHRONIC PAIN OF RIGHT KNEE: Primary | ICD-10-CM

## 2023-07-24 DIAGNOSIS — M25.561 CHRONIC PAIN OF RIGHT KNEE: Primary | ICD-10-CM

## 2023-07-24 PROCEDURE — 97530 THERAPEUTIC ACTIVITIES: CPT | Performed by: PHYSICAL THERAPIST

## 2023-07-24 PROCEDURE — 97110 THERAPEUTIC EXERCISES: CPT | Performed by: PHYSICAL THERAPIST

## 2023-07-24 PROCEDURE — 97140 MANUAL THERAPY 1/> REGIONS: CPT | Performed by: PHYSICAL THERAPIST

## 2023-07-24 NOTE — PROGRESS NOTES
Daily Note     Today's date: 2023  Patient name: Nayeli Swanson  : 1970  MRN: [id number]  Referring provider: Latisha Paris DO  Dx:   Encounter Diagnosis     ICD-10-CM    1. Chronic pain of right knee  M25.561     G89.29           Start Time: 1745  Stop Time: 1830  Total time in clinic (min): 45 minutes    Subjective: Pt denies any new changes but hasn't tested her knee recently by kneeling on it. Lower back pain overall doing well. Pt still intermittently experiences LBP but has been consistent with the exercises. Objective: See treatment diary below      Assessment: Treatment focused on LE and quadriceps strength. Tolerated treatment well. Patient appropriately fatigued with program. Patient exhibited good technique with therapeutic exercises and would benefit from continued PT. Plan: Continue per plan of care.       Precautions: Standard    Manuals 5/23 5/26 6/5 6/8 6/12 6/26 6/29 7/3 7/20 7/24   CPA L/S  Gr III 8' Gr III 8'  Gr III 8'  Gr III 8'  Gr III 8       B UPA L/S             Neutral Gap  Gr III   Uncomfortable         IASTM using LLL       nv to lateral joint line and ITB  4:20  4:20 to lateral knee   Neuro Re-Ed             Bridge   10x 10x 20x 2x10 2x10       Bridge with march  3x5 3x5 2x5 2x5 10x       SLR Abd TrA             Clam shell   btb 2x10          Prone hip extension  10x on ea 2x10 ea           Side bridge              Quad hip extension             Prone alt UE/LE lifts     2x10         LE lowering from 90/90 positions     10x on ea leg  10x on ea leg        Sciatic nerve slider     10x on ea LE        TKE at [facility]   3x15 20# B 3x15 20# B   Ther Ex             Bicycle         7' L 3  8' L3 8' L4   Elliptical  5' 5' 5' 10' 5' 5'       Side glides  10x on ea 10x on ea          LTR c/ PB  20x ea  20x 20x 10x        Press up  2x10 2x10 2x10 2x10 2x10       Prayer stretch   10x10"  10x10" 10x10"         Knee extension machine       nv 3x10 33# 3x10 33# 3x10 33# Leg curl       nv  3x10 33# 3x10 33# 3x10 33#   SLS on foam        nv 3x10" B 5x10" B    Standing hamstring stretch on step        10x10" B  3x30" B  3x30" B   Ther Activity             Prone planks   10x5"          Anti-rotation press    10x blk tb  10x ea direction blk tb Stir the pot   10x ea direction blk tb Stir the pot        Hip hinge with dowel    15x with dowel and cueing  RDLs 15# KB 2x10 RDLs 15# KB 2x10       Lateral walks      4x20 ft rtb        Leg press        nv 3x10 70# 3x10 70# 3x15 90#   RDLs       nv 20# KB 3x10  defers    Squat with hip on BOSU       nv   10x- hold- too challenging   TRX reverse lunge        nv 2x10 3x10 3x10                Gait Training                                       Modalities                                       1:1 with PT from 545-630pm

## 2023-07-27 ENCOUNTER — OFFICE VISIT (OUTPATIENT)
Dept: PHYSICAL THERAPY | Facility: CLINIC | Age: 53
End: 2023-07-27
Payer: COMMERCIAL

## 2023-07-27 DIAGNOSIS — G89.29 CHRONIC PAIN OF RIGHT KNEE: Primary | ICD-10-CM

## 2023-07-27 DIAGNOSIS — M25.561 CHRONIC PAIN OF RIGHT KNEE: Primary | ICD-10-CM

## 2023-07-27 PROCEDURE — 97140 MANUAL THERAPY 1/> REGIONS: CPT | Performed by: PHYSICAL THERAPIST

## 2023-07-27 PROCEDURE — 97110 THERAPEUTIC EXERCISES: CPT | Performed by: PHYSICAL THERAPIST

## 2023-07-27 NOTE — PROGRESS NOTES
Daily Note     Today's date: 2023  Patient name: Randi Agrawal  : 1970  MRN: [id number]  Referring provider: Sherrie Saul DO  Dx:   Encounter Diagnosis     ICD-10-CM    1. Chronic pain of right knee  M25.561     G89.29           Start Time: 1745  Stop Time: 1831  Total time in clinic (min): 46 minutes    Subjective: Pt states that she's not going to be able to do much with exercises secondary to knee pain and fatigue from working in the heat. Objective: See treatment diary below      Assessment: Pt likely experienced delayed onset muscle soreness after last session. Most of her discomfort is in posterior aspect of the R thigh at hamstring insertion. Implemented IASTM to distal hamstring. Modified treatment secondary to new complaints of pain/discomfort. Performed light loading to quad/hamstring with good tolerance. Will resume all exercises next session as tolerated. Patient would benefit from continued PT. Plan: Continue per plan of care.       Precautions: Standard    Manuals 7/27    6/12 6/26 6/29 7/3 7/20 7/24   CPA L/S     Gr III 8'  Gr III 8       B UPA L/S             Neutral Gap              IASTM using LLL 4:20 to posterio left hamstring insertion      nv to lateral joint line and ITB  4:20  4:20 to lateral knee   Neuro Re-Ed             Bridge      2x10 2x10       Bridge with march     2x5 10x       SLR Abd TrA             Clam shell             Prone hip extension             Side bridge              Quad hip extension             Prone alt UE/LE lifts              LE lowering from 90/90 positions     10x on ea leg  10x on ea leg        Sciatic nerve slider     10x on ea LE        TKE at [employer] 20# B 3x15 20# B   Ther Ex             Bicycle  8' L1       7' L 3  8' L3 8' L4   Elliptical      5' 5'       Side glides             LTR c/ PB     20x 10x        Press up     2x10 2x10       Prayer stretch              Knee extension machine 3x10 33#      nv 3x10 33# 3x10 33# 3x10 33#   Leg curl supine 3x10       nv  3x10 33# 3x10 33# 3x10 33#   SLS on foam        nv 3x10" B 5x10" B    Standing hamstring stretch on step 3x30" B       10x10" B  3x30" B  3x30" B   Ther Activity             Prone planks             Anti-rotation press     10x ea direction blk tb Stir the pot   10x ea direction blk tb Stir the pot        Hip hinge with dowel     RDLs 15# KB 2x10 RDLs 15# KB 2x10       Lateral walks      4x20 ft rtb        Leg press 3x10 75#       nv 3x10 70# 3x10 70# 3x15 90#   RDLs       nv 20# KB 3x10  defers    Squat with hip on BOSU       nv   10x- hold- too challenging   TRX reverse lunge        nv 2x10 3x10 3x10                Gait Training                                       Modalities                                       1:1 with PT from 705-601pm

## 2023-08-03 ENCOUNTER — TELEPHONE (OUTPATIENT)
Dept: HEMATOLOGY ONCOLOGY | Facility: CLINIC | Age: 53
End: 2023-08-03

## 2023-08-03 NOTE — TELEPHONE ENCOUNTER
Appointment Change  Cancel, Reschedule, Change to Virtual      Who are you speaking with? Patient   If it is not the patient, are they listed on an active communication consent form? N/A   Which provider is the appointment scheduled with? Daiana Joel PA-C   When is the appointment scheduled? Please list date and time  8/4/23 8am   At which location is the appointment scheduled to take place? [place]   Was the appointment rescheduled or changed from an in person visit to a virtual visit? If so, please list the details of the change. n/a   What is the reason for the appointment change? Work conflict   Was [facility] transport scheduled for this visit? n/a   Does [facility] need to be scheduled for the new visit (if applicable) N/A   Does the patient need an infusion appointment rescheduled? N/A   Does the patient have an infusion appointment scheduled? If so, when? No   Is the patient undergoing chemotherapy? N/A   Was the no-show policy reviewed for appointments being changed with less then 24 hours of notice?  N/A

## 2023-08-13 NOTE — PROGRESS NOTES
PT-Discharge    Today's date: 2023  Patient name: Tayler Stevenson  : 1970  MRN: [id number]  Referring provider: Romana Andersen DO  Dx:   Encounter Diagnosis     ICD-10-CM    1. Chronic pain of right knee  M25.561     G89.29                      Assessment  Assessment details: Tayler Stevenson is a 47 y.o. female who presents with multiple locations of pain over the course of PT. Pt was successfully treated for her lower back pain in . Since the end of  and over the last 6 weeks she completed 6 sessions of PT for her right knee. Pt was also evaluated for acute on chronic neck pain with h/o migraines. Overall, patient's knee pain hasn't changed much. She is still having difficulty with kneeling on her knee. Mild functional weakness present in right knee compared to left noted with Single leg squat and 8" step down. Her tenderness to the lateral knee and distal ITB insertion is improving, although she continues to have pain with kneeling. Pt expressed concerns with work schedule and limited time. She would like to continue exercising at home. She plans to return in September to treat the neck and her persistent migraines. Pt will be discharged to [facility] at this time.      Primary movement impairments of the right knee:  1)Decreased strength of right quadriceps-Improving  2)Tenderness to lateral joint line : Not present    Primary movement impairments:   1)Decrease L/S ROM (pain at end range- all directions)-Improved  2)Decreased L/S joint mobility-Improved  3)Poor gluteal firing R worse than L-Improved    Impairments: abnormal muscle firing, abnormal or restricted ROM, activity intolerance, impaired physical strength, lacks appropriate home exercise program and pain with function  Understanding of Dx/Px/POC: good   Prognosis: good    Goals  STG: To be achieved in 4 -6 weeks  1)Improve lumbar spine ROM by 25%-MET   2)Reduce pain by 50%-MET  3)Improve flexibility to mild restrictions-MET  4)Improve strength in lower extremity by 1/2 MMT-MET  5)Improve right quadriceps strength to 5/5   6)Reduce tenderness to lateral joint line of right knee by 50%    LTG: To be achieved at Discharge  1)Patient is independent with HEP-MET  2)Improve FOTO to greater than or equal to expected-MET  3)Improve tolerance to prolonged sitting, standing, and walking to prior level of function-Partially met (continue for R knee)       Plan  D/C to HEP. Subjective Evaluation    History of Present Illness  Mechanism of injury: History of Current Injury: Pt referred to PT from PCP due to acute right knee pain and acute lower back pain. Pt more concerned with her lower back issue at this time. Pt started to experience pain after falling on April 14th at [facility]. She went to [facility] ED for a work up on 5/1 and received XR of the right knee and L-Spine with results below. Pt denies any buckling of the right LE but does report weakness in RLE at the knee. Pt does state that her knee had a contusion. Pain location/Descriptors: P1: Centralized lower back pain more toward the R side. Pain is non-radiating. P2: R posterior and anterior knee pain. Describes knee as "tight."  Aggravating factors: Prolonged standing, prolonged walking, lifting, Lying  Easing factors:   24 HR pattern:   Imaging: XR impression of right knee: No acute osseous pathology. L-spine XR: Scoliosis and fact arthropathy   Special Questions: Denies any numbness/tingling in the extremities, saddle anesthesia, or B&B issues. Patient goals: Alleviate pain   Hobbies/Interest: Exercises regularly and walks. Occupation: Business/Sales: frequently travels. Pain  Pain scale: L/S: Sore ache (mild) : Knee: mild discomfort   Pain scale at highest: L/S: mild. R knee: Moderately tight and sore.       Diagnostic Tests  X-ray: normal  Treatments  No previous or current treatments  Patient Goals  Patient goals for therapy: increased strength, decreased pain, increased motion and independence with ADLs/IADLs          Objective     Neurological Testing     Sensation     Lumbar   Left   Intact: light touch    Right   Intact: light touch    Reflexes   Left   Patellar (L4): normal (2+)  Achilles (S1): normal (2+)  Clonus sign: negative    Right   Patellar (L4): normal (2+)  Achilles (S1): normal (2+)  Clonus sign: negative    Active Range of Motion     Lumbar   Flexion: 120 degrees    Extension: 50 degrees    Left lateral flexion: 50 degrees       Right lateral flexion: 50 degrees  with pain  Left rotation:  WFL  Right rotation:  [facility]    Additional Active Range of Motion Details  *Pain toward end range motion in all planes. -Now WNL  *No radicular symptoms   *Repeated extension: pain at end range     Joint Play     Hypomobile: L1, L2, L3, L4 and L5 -Improved    Pain: L1, L2, L3, L4 and L5 -Not pain     Strength/Myotome Testing     Left Hip   Planes of Motion   Flexion: 5  Extension: 4+  Abduction: 4+  External rotation: 5    Right Hip   Planes of Motion   Flexion: 5  Extension: 4+  Abduction: 4  External rotation: 5    Left Knee   Flexion: 5  Extension: 5    Right Knee   Flexion: 5  Extension: 4    Left Ankle/Foot   Dorsiflexion: 5  Plantar flexion: 5  Great toe extension: 5    Right Ankle/Foot   Dorsiflexion: 5  Plantar flexion: 5  Great toe extension: 5    Tests     Lumbar     Left   Positive quadrant. Negative crossed SLR, femoral stretch and passive SLR. Right   Positive quadrant. Negative crossed SLR, femoral stretch and passive SLR. Left Hip   Negative DEMETRIO and FADIR. Right Hip   Negative DEMETRIO and FADIR. Right Knee   Negative anterior drawer, anterior Lachman, posterior drawer, valgus stress test at 0 degrees, valgus stress test at 30 degrees, varus stress test at 0 degrees and varus stress test at 30 degrees.      Additional Tests Details  *Pain with quadrant testing-Negative  *Increased tone with L/S paraspinals-WNL  *Decreased joint mobility in L/S with  (throughout) -WNL  *Normal hamstring length and SLR : Improved   *Quadriceps flexibility: WNL   *Moderate tenderness to lateral knee of tibial tubercle  *Full flexion pain free  *Full extension pain free  *No joint effusion   *Functional squat: WNL  *Single leg squat: pain, valgus, decreased control on R vs L (Improving)  *8" step down: Mild weakness on R vs L      Precautions: Standard    Manuals 7/27 8/14   6/12 6/26 6/29 7/3 7/20 7/24   CPA L/S     Gr III 8'  Gr III 8       B UPA L/S             Neutral Gap              IASTM using LLL 4:20 to posterio left hamstring insertion 4:20 in lateral left knee     nv to lateral joint line and ITB  4:20  4:20 to lateral knee   Fibular head mobilizations  L Gr II+III            Neuro Re-Ed             Bridge      2x10 2x10       Bridge with march     2x5 10x       SLR Abd TrA             Clam shell             Prone hip extension             Side bridge              Quad hip extension             Prone alt UE/LE lifts              LE lowering from 90/90 positions     10x on ea leg  10x on ea leg        Sciatic nerve slider     10x on ea LE        TKE at [employer] 20# B 3x15 20# B   Ther Ex             Bicycle  8' L1 8' L1      7' L 3  8' L3 8' L4   Elliptical      5' 5'       Side glides             LTR c/ PB     20x 10x        Press up     2x10 2x10       Prayer stretch              Knee extension machine 3x10 33#      nv 3x10 33# 3x10 33# 3x10 33#   Leg curl supine 3x10       nv  3x10 33# 3x10 33# 3x10 33#   SLS on foam        nv 3x10" B 5x10" B    Standing hamstring stretch on step 3x30" B       10x10" B  3x30" B  3x30" B   Ther Activity             Prone planks             Anti-rotation press     10x ea direction blk tb Stir the pot   10x ea direction blk tb Stir the pot        Hip hinge with dowel     RDLs 15# KB 2x10 RDLs 15# KB 2x10       Lateral walks      4x20 ft rtb        Leg press 3x10 75#       nv 3x10 70# 3x10 70# 3x15 90#   RDLs       nv 20# KB 3x10  defers    Squat with hip on BOSU       nv   10x- hold- too challenging   TRX reverse lunge        nv 2x10 3x10 3x10                Gait Training                                       Modalities                                       1:1 with PT from 550-630pm  Pt was re-evaluated x 30 minutes

## 2023-08-14 ENCOUNTER — EVALUATION (OUTPATIENT)
Dept: PHYSICAL THERAPY | Facility: CLINIC | Age: 53
End: 2023-08-14
Payer: COMMERCIAL

## 2023-08-14 DIAGNOSIS — G89.29 CHRONIC PAIN OF RIGHT KNEE: Primary | ICD-10-CM

## 2023-08-14 DIAGNOSIS — M25.561 CHRONIC PAIN OF RIGHT KNEE: Primary | ICD-10-CM

## 2023-08-14 PROCEDURE — 97112 NEUROMUSCULAR REEDUCATION: CPT | Performed by: PHYSICAL THERAPIST

## 2023-08-14 PROCEDURE — 97140 MANUAL THERAPY 1/> REGIONS: CPT | Performed by: PHYSICAL THERAPIST

## 2023-08-14 PROCEDURE — 97110 THERAPEUTIC EXERCISES: CPT | Performed by: PHYSICAL THERAPIST

## 2023-12-07 NOTE — PROGRESS NOTES
PT Evaluation     Today's date: 2023  Patient name: Elin Panda  : 1970  MRN: [id number]  Referring provider: Lelan Paget, PT  Dx:   Encounter Diagnosis     ICD-10-CM    1. Cervicalgia  M54.2       2. TMJ tenderness, right  M26.621                      Assessment  Assessment details: Elin Panda is a 46 y.o. female who presents with signs and symptoms consistent of multifactorial pain involving the neck (left side > right), right jaw, and headaches. Pt presents with right sided TMD with neck pain and headaches. Patient presents with a diagnostic classification of Axis I myofascial and disc disorder with normal ROM. Patient has opening and closing click with potential disc reduction. Patient also present with Axis II features including anxiety. Pt currently experiencing right sided jaw pain, posterior neck pain, decreased ROM (jaw and neck), poor motor control, poor proprioception, cervical pain, headaches, and tenderness to muscles of mastication. Due to these impairments, Patient has difficulty performing neck rotation, chewing, eating hard foods, concentrating, yawning, and talking. Patient would benefit from skilled physical therapy to address the impairments, improve their level of function, and to improve their overall quality of life. Primary movement impairments:   1) Aberrant movements with all C/S ROM  2) Opening/closing click of the jaw with deflection to the Left   3) Associated headaches with neck ROM       Impairments: abnormal coordination, abnormal muscle firing, abnormal muscle tone, abnormal or restricted ROM, abnormal movement, activity intolerance, lacks appropriate home exercise program, pain with function, poor posture  and poor body mechanics  Understanding of Dx/Px/POC: good   Prognosis: good    Goals  Short Term Goals: to be achieved by 4 weeks  1) Patient to be independent with basic HEP.   2) Decrease pain to 3/10 at its worst.  3) Increase cervical spine ROM by 5-10 degrees in all deficient planes. 4) Improve Jaw ROM by 2-3 mm in each plane in pain free manner  5) Reduce and or self manage jaw clicking during opening and closing. Long Term Goals: to be achieved by discharge  1) FOTO equal to or greater than expected. 2) Patient to be independent with comprehensive HEP. 3) Cervical spine ROM fluidity to no aberrant movements in all planes to improve a/iadls. Plan  Plan details: Habits to break:  Chewing gum and/or ice cubes, Eating hard candy, Leaning head in palms of hands, Reading with books below chest height, Blocking yawning    Patient would benefit from: PT eval and skilled physical therapy  Planned modality interventions: low level laser therapy  Planned therapy interventions: joint mobilization, manual therapy, neuromuscular re-education, patient education, therapeutic activities, therapeutic exercise and home exercise program  Frequency: 2x per week for 4 weeks. Treatment plan discussed with: patient      Subjective Evaluation    History of Present Illness  Mechanism of injury: History of Current Injury: Pt is a self referral to PT secondary to right sided jaw pain and neck pain. Pt also complains of associated tension like headaches. Pt went to ED on 12/2 and was given Toradol to manage symptoms. Pt reports a recent increase of neck pain. She reports a history of neck pain which has worsened over the last month. Pt had an MRI of cervical spine in 2018 which identified DDD. Pt notes more pain with closing the jaw but does report an opening click. Pt does admit to bruxism. Pain location/Descriptors: 1) Dull and tightness L>R posterior neck which radiates into the upper trap. 2)R jaw pain with temporal discomfort. 3)Headaches (temporal region and occipitalis)  Aggravating factors: chewing, opening, turning neck,   Easing factors: Toradol, heat, heat packs from [facility], Lying down   24 HR pattern: Depending on activity.  Worse in AM.   Imaging: Previous MRI of C/S   Special Questions: Clearence Media denies a new onset of Bladder incontinence, Bowel dysfunction, Dizziness, Dysphagia, Dysarthria, Drop attacks, Diplopia, Tingling, Numbness, and Saddle anesthesia . Patient goals:  Manage her symptoms. Hobbies/Interest: Regularly working out, enjoys to travel  Occupation: Sales      Quality of life: good    Pain  Current pain ratin  At worst pain ratin      Objective     Active Range of Motion   Cervical/Thoracic Spine       Cervical    Flexion: 70 degrees   Extension: 64 degrees     with pain  Left lateral flexion: 40 degrees      Right lateral flexion: 40 degrees      Left rotation: 65 degrees  Right rotation: 88 degrees       Additional Active Range of Motion Details  Poor fluidity of movement in CS/Aberrant movement in cervical spine (All planes of motion) with pain  *Extension  *lateral flexion  *L rotation    Tests   Cervical   Negative alar ligament test, Sharp-Maren test and VBI.      Additional Tests Details  Tenderness to palpation:   *B Masseter  *IMELDA  *B upper trapezius   TMJ   Joint sounds left: clicking, locking and popping  Joint sounds right: clicking, locking and popping  Opening (mm): 45, left deflection and left deflection   Lateral excursion, left (mm): 10  Lateral excursion, right (mm)t: 12   Protrusion (mm): 10              Precautions: Standard      Manuals             Assess C/S joint mobility             Assess OA and AA joints             Masseter , temporalis STM B/L                          Neuro Re-Ed             C/S retraction             DNF hold             Cervical isometrics             TB ws             Rows mid and low             Controlled opening of jaw                          Ther Ex             UBE             LS stretch              C/S rot snag              Protraction             Lateral deviation                                                     Ther Activity                                       Gait Training Modalities                                        [email or web address]  Access Code: Carmina Littlejohn

## 2023-12-11 ENCOUNTER — EVALUATION (OUTPATIENT)
Dept: PHYSICAL THERAPY | Facility: CLINIC | Age: 53
End: 2023-12-11
Payer: COMMERCIAL

## 2023-12-11 DIAGNOSIS — M26.621 TMJ TENDERNESS, RIGHT: ICD-10-CM

## 2023-12-11 DIAGNOSIS — M54.2 CERVICALGIA: Primary | ICD-10-CM

## 2023-12-11 PROCEDURE — 97162 PT EVAL MOD COMPLEX 30 MIN: CPT | Performed by: PHYSICAL THERAPIST

## 2023-12-14 ENCOUNTER — OFFICE VISIT (OUTPATIENT)
Dept: PHYSICAL THERAPY | Facility: CLINIC | Age: 53
End: 2023-12-14
Payer: COMMERCIAL

## 2023-12-14 DIAGNOSIS — M54.2 CERVICALGIA: Primary | ICD-10-CM

## 2023-12-14 DIAGNOSIS — M26.621 TMJ TENDERNESS, RIGHT: ICD-10-CM

## 2023-12-14 PROCEDURE — 97110 THERAPEUTIC EXERCISES: CPT | Performed by: PHYSICAL THERAPIST

## 2023-12-14 PROCEDURE — 97112 NEUROMUSCULAR REEDUCATION: CPT | Performed by: PHYSICAL THERAPIST

## 2023-12-14 PROCEDURE — 97140 MANUAL THERAPY 1/> REGIONS: CPT | Performed by: PHYSICAL THERAPIST

## 2023-12-14 NOTE — PROGRESS NOTES
Daily Note     Today's date: 2023  Patient name: Amadeo Mcelroy  : 1970  MRN: [id number]  Referring provider: Niecy Swan, PT  Dx:   Encounter Diagnosis     ICD-10-CM    1. Cervicalgia  M54.2       2. TMJ tenderness, right  M26.621           Start Time: 1205  Stop Time: 1245  Total time in clinic (min): 40 minutes    Subjective: Pt reports working on her HEP. Objective: See treatment diary below  Lateral glide: hypomobile C2-C7 - more stiff on right  C7 lateral glides= more hypomobile segment    Rotational glides: WNL L/Mild hypomobile on R    OA: Moderate limitation on L/normal on R    Significant tone and tension in B UT      Assessment: Continued assessment of cervical spine. Implemented treatment focusing on primary movement impairments. Tolerated treatment well. Patient exhibited good technique with therapeutic exercises and would benefit from continued PT. Plan: Continue per plan of care.       Precautions: Standard      Manuals             Assess C/S joint mobility Assessed see above            Assess OA and AA joints Assesssed             Masseter , temporalis STM B/L             SOR C/ gentle traction x 4'            Cervical lateral glides B Gr II+III focused on C7            IASTM using LLL to B masseter  2' x2            Neuro Re-Ed             C/S retraction 20x5"            DNF hold 10x5"            Cervical isometrics             TB ws             Rows mid and low 2x10 20# Universal             Controlled opening of jaw 20x                         Ther Ex             UBE 3'/3'            LS stretch              C/S rot snag              Protraction 10x            Lateral deviation                                                     Ther Activity                                       Gait Training                                       Modalities                                       1:1 with PT from 1205-1245pm

## 2023-12-19 ENCOUNTER — OFFICE VISIT (OUTPATIENT)
Dept: PHYSICAL THERAPY | Facility: CLINIC | Age: 53
End: 2023-12-19
Payer: COMMERCIAL

## 2023-12-19 DIAGNOSIS — M26.621 TMJ TENDERNESS, RIGHT: ICD-10-CM

## 2023-12-19 DIAGNOSIS — M54.2 CERVICALGIA: Primary | ICD-10-CM

## 2023-12-19 PROCEDURE — 97110 THERAPEUTIC EXERCISES: CPT | Performed by: PHYSICAL THERAPIST

## 2023-12-19 PROCEDURE — 97140 MANUAL THERAPY 1/> REGIONS: CPT | Performed by: PHYSICAL THERAPIST

## 2023-12-19 PROCEDURE — 97112 NEUROMUSCULAR REEDUCATION: CPT | Performed by: PHYSICAL THERAPIST

## 2023-12-19 NOTE — PROGRESS NOTES
"Daily Note     Today's date: 2023  Patient name: Lisa M Gitlin  : 1970  MRN: [id number]  Referring provider: Tutu Gray, PT  Dx:   Encounter Diagnosis     ICD-10-CM    1. Cervicalgia  M54.2       2. TMJ tenderness, right  M26.621           Start Time: 1745  Stop Time: 1830  Total time in clinic (min): 45 minutes    Subjective: Pt reports having a mild migraine but one was present all weekend. Pt notes feeling shaky after her migraines.       Objective: See treatment diary below      Assessment: Tolerated treatment fair. Pt continues to have pain in SOMs, upper trap region, and masseter region. Gentle cervical lateral glides continues to increase pain. Pt needs cueing for correct performance of DNF exercise. SCM tends to be active during DNF. Implemented biofeedback to help improve DNF control. PT needed the assistance of her hand. Patient exhibited good technique with therapeutic exercises and would benefit from continued PT      Plan: Continue per plan of care.      Precautions: Standard      Manuals            Assess C/S joint mobility Assessed see above            Assess OA and AA joints Assesssed             Masseter , temporalis STM B/L             SOR C/ gentle traction x 4' C/ gentle traction x 4'           Cervical lateral glides B Gr II+III focused on C7 B Gr II+III focused on C7           IASTM using LLL to B masseter  2' x2 2'x2           Neuro Re-Ed             C/S retraction 20x5\"            DNF hold 10x5\" 10x5\" with hand assist  (10x just nodding to improve control)            Cervical isometrics             TB ws  2x10 ytb            Rows mid and low 2x10 20# Universal             Controlled opening of jaw 20x 30x                        Ther Ex             UBE 3'/3' 3'/3'           LS stretch   5x10\"            C/S rot snag              Protraction 10x 10x5\"           Lateral deviation   10x5\" ea                                                  Ther Activity              "                          Gait Training                                       Modalities                                       1:1 with PT from 080-534pm

## 2023-12-21 ENCOUNTER — APPOINTMENT (OUTPATIENT)
Dept: PHYSICAL THERAPY | Facility: CLINIC | Age: 53
End: 2023-12-21
Payer: COMMERCIAL

## 2023-12-22 ENCOUNTER — PATIENT MESSAGE (OUTPATIENT)
Dept: NEUROLOGY | Facility: CLINIC | Age: 53
End: 2023-12-22

## 2023-12-27 ENCOUNTER — OFFICE VISIT (OUTPATIENT)
Dept: PHYSICAL THERAPY | Facility: CLINIC | Age: 53
End: 2023-12-27
Payer: COMMERCIAL

## 2023-12-27 DIAGNOSIS — M26.621 TMJ TENDERNESS, RIGHT: ICD-10-CM

## 2023-12-27 DIAGNOSIS — M54.2 CERVICALGIA: Primary | ICD-10-CM

## 2023-12-27 PROCEDURE — 97110 THERAPEUTIC EXERCISES: CPT | Performed by: PHYSICAL THERAPIST

## 2023-12-27 PROCEDURE — 97140 MANUAL THERAPY 1/> REGIONS: CPT | Performed by: PHYSICAL THERAPIST

## 2023-12-27 NOTE — PROGRESS NOTES
"Daily Note     Today's date: 2023  Patient name: Lisa M Gitlin  : 1970  MRN: [id number]  Referring provider: Tutu Gray, PT  Dx:   Encounter Diagnosis     ICD-10-CM    1. Cervicalgia  M54.2       2. TMJ tenderness, right  M26.621           Start Time: 0800  Stop Time: 0845  Total time in clinic (min): 45 minutes    Subjective: Pt reports feeling mild improvement in her neck and has been doing her stretches/HEP. Pt does admit to still having migraines. She was offer a migraine shot and anti-anxiety medication from PCP, according to patient.       Objective: See treatment diary below      Assessment: Tolerated treatment well. Lateral glides improved with mobility and pain sensitivity. Motor control of DNF also improved with less compensatory muscle strategy. . Patient would benefit from continued PT      Plan: Continue per plan of care.      Precautions: Standard      Manuals           Assess C/S joint mobility Assessed see above            Assess OA and AA joints Assesssed             Masseter , temporalis STM B/L             SOR C/ gentle traction x 4' C/ gentle traction x 4' C/ gentle traction x 4'          Cervical lateral glides B Gr II+III focused on C7 B Gr II+III focused on C7 B Gr II+III focused on C7          IASTM using LLL to B masseter  2' x2 2'x2 2'x2          Neuro Re-Ed             C/S retraction 20x5\"            DNF hold 10x5\" 10x5\" with hand assist  (10x just nodding to improve control)  10x5\"          Cervical isometrics             TB ws  2x10 ytb            Rows mid and low 2x10 20# Universal             Controlled opening of jaw 20x 30x 3x10                       Ther Ex             UBE 3'/3' 3'/3' 3'/3'          LS stretch   5x10\"  5x10\" B          C/S rot snag              Protraction 10x 10x5\" 10x5\"          Lateral deviation   10x5\" ea 10x5\"                                                 Ther Activity                                       Gait Training    "                                    Modalities                10' to finish                        1:1 with PT from 250-249v

## 2023-12-29 ENCOUNTER — APPOINTMENT (OUTPATIENT)
Dept: PHYSICAL THERAPY | Facility: CLINIC | Age: 53
End: 2023-12-29
Payer: COMMERCIAL

## 2024-01-02 ENCOUNTER — OFFICE VISIT (OUTPATIENT)
Dept: PHYSICAL THERAPY | Facility: CLINIC | Age: 54
End: 2024-01-02
Payer: COMMERCIAL

## 2024-01-02 DIAGNOSIS — M26.621 TMJ TENDERNESS, RIGHT: ICD-10-CM

## 2024-01-02 DIAGNOSIS — M54.2 CERVICALGIA: Primary | ICD-10-CM

## 2024-01-02 PROCEDURE — 97140 MANUAL THERAPY 1/> REGIONS: CPT | Performed by: PHYSICAL THERAPIST

## 2024-01-02 NOTE — PROGRESS NOTES
"Daily Note     Today's date: 2024  Patient name: Lisa M Gitlin  : 1970  MRN: [id number]  Referring provider: Tutu Gray, PT  Dx:   Encounter Diagnosis     ICD-10-CM    1. Cervicalgia  M54.2       2. TMJ tenderness, right  M26.621           Start Time: 1750  Stop Time: 1825  Total time in clinic (min): 35 minutes    Subjective: Pt attends PT with a severe migraine. She state that she attempted to perform neck exercise but continues to experiences auras. Pt notes that she started new years malik.       Objective: See treatment diary below      Assessment: Pt scheduled an appointment with Dr. Christianson on  to discuss current migraine situation. She is very tender to IMELDA, UT, and cervical paraspinals. Pt is very guarded during movement patterns today. Performed MH to start followed by graded mobilizations at cervicothoracic spine. Encouraged patient to gradually move her neck and stretch in different planes of motion. Pt had difficulty tolerating most movements of C/S secondary to pain intensity.       Plan: Continue per plan of care.      Precautions: Standard      Manuals          Assess C/S joint mobility Assessed see above            Assess OA and AA joints Assesssed             Masseter , temporalis STM B/L             T/S     Upper T/S gr II+III         SOR C/ gentle traction x 4' C/ gentle traction x 4' C/ gentle traction x 4' C/ gentle traction x 4'         Cervical lateral glides B Gr II+III focused on C7 B Gr II+III focused on C7 B Gr II+III focused on C7 B Gr II+III focused on C7         IASTM using LLL to B masseter  2' x2 2'x2 2'x2          Neuro Re-Ed             C/S retraction 20x5\"   20x5\"         DNF hold 10x5\" 10x5\" with hand assist  (10x just nodding to improve control)  10x5\"          Cervical isometrics             TB ws  2x10 ytb            Rows mid and low 2x10 20# Universal             Controlled opening of jaw 20x 30x 3x10                       Ther Ex    " "         UBE 3'/3' 3'/3' 3'/3'          LS stretch   5x10\"  5x10\" B          C/S rot snag              Protraction 10x 10x5\" 10x5\"          Lateral deviation   10x5\" ea 10x5\"                                                 Ther Activity                                       Gait Training                                       Modalities             MH   10' to finish  To start 10'                               "

## 2024-01-04 ENCOUNTER — APPOINTMENT (OUTPATIENT)
Dept: PHYSICAL THERAPY | Facility: CLINIC | Age: 54
End: 2024-01-04
Payer: COMMERCIAL

## 2024-01-08 ENCOUNTER — OFFICE VISIT (OUTPATIENT)
Dept: PHYSICAL THERAPY | Facility: CLINIC | Age: 54
End: 2024-01-08
Payer: COMMERCIAL

## 2024-01-08 DIAGNOSIS — M54.2 CERVICALGIA: Primary | ICD-10-CM

## 2024-01-08 DIAGNOSIS — M26.621 TMJ TENDERNESS, RIGHT: ICD-10-CM

## 2024-01-08 PROCEDURE — 97140 MANUAL THERAPY 1/> REGIONS: CPT | Performed by: PHYSICAL THERAPIST

## 2024-01-08 PROCEDURE — 97110 THERAPEUTIC EXERCISES: CPT | Performed by: PHYSICAL THERAPIST

## 2024-01-08 NOTE — PROGRESS NOTES
"Daily Note     Today's date: 2024  Patient name: Lisa M Gitlin  : 1970  MRN: [id number]  Referring provider: Tutu Gray, PT  Dx:   Encounter Diagnosis     ICD-10-CM    1. Cervicalgia  M54.2       2. TMJ tenderness, right  M26.621           Start Time: 1155  Stop Time: 1245  Total time in clinic (min): 50 minutes    Subjective: Pt denies any headache this morning.       Objective: See treatment diary below      Assessment: Tolerated treatment well. Improved cervical pain, joint mobility, muscle tone, and tenderness today compared to previous session. No migraine/headache noted today. She states that it did last 3 days. She is scheduled to see neuro regarding her frequent headaches. Patient exhibited good technique with therapeutic exercises and would benefit from continued PT.       Plan: Continue per plan of care.      Precautions: Standard      Manuals         Assess C/S joint mobility Assessed see above            Assess OA and AA joints Assesssed             Masseter , temporalis STM B/L             T/S     Upper T/S gr II+III Upper T/S gr II+III        SOR C/ gentle traction x 4' C/ gentle traction x 4' C/ gentle traction x 4' C/ gentle traction x 4' C/ gentle traction x 4'        Cervical lateral glides B Gr II+III focused on C7 B Gr II+III focused on C7 B Gr II+III focused on C7 B Gr II+III focused on C7 B Gr II+III focused on C7        IASTM using LLL to B masseter  2' x2 2'x2 2'x2          Neuro Re-Ed             C/S retraction 20x5\"   20x5\" 20x5\"        DNF hold 10x5\" 10x5\" with hand assist  (10x just nodding to improve control)  10x5\"          Cervical isometrics             TB ws  2x10 ytb            Rows mid and low 2x10 20# Universal             Controlled opening of jaw 20x 30x 3x10  30x                     Ther Ex             UBE 3'/3' 3'/3' 3'/3'  2'/2'        LS stretch   5x10\"  5x10\" B          C/S rot snag      10x5\" B        Protraction 10x 10x5\" " "10x5\"          Lateral deviation   10x5\" ea 10x5\"          C/S lat flexion     10x5\" B                                   Ther Activity                                       Gait Training                                       Modalities             MH   10' to finish  To start 10'                      1:1 with PT from 1155-1220pm           "

## 2024-01-11 ENCOUNTER — EVALUATION (OUTPATIENT)
Dept: PHYSICAL THERAPY | Facility: CLINIC | Age: 54
End: 2024-01-11
Payer: COMMERCIAL

## 2024-01-11 DIAGNOSIS — M54.2 CERVICALGIA: ICD-10-CM

## 2024-01-11 DIAGNOSIS — M26.621 TMJ TENDERNESS, RIGHT: Primary | ICD-10-CM

## 2024-01-11 PROCEDURE — 97110 THERAPEUTIC EXERCISES: CPT | Performed by: PHYSICAL THERAPIST

## 2024-01-11 PROCEDURE — 97140 MANUAL THERAPY 1/> REGIONS: CPT | Performed by: PHYSICAL THERAPIST

## 2024-01-11 PROCEDURE — 97530 THERAPEUTIC ACTIVITIES: CPT | Performed by: PHYSICAL THERAPIST

## 2024-01-11 NOTE — PROGRESS NOTES
[id number]    Today's date: 2024  Patient name: Lisa M Gitlin  : 1970  MRN: [id number]  Referring provider: Tutu Gray PT  Dx:   Encounter Diagnosis     ICD-10-CM    1. TMJ tenderness, right  M26.621       2. Cervicalgia  M54.2           Start Time: 1645  Stop Time: 1730  Total time in clinic (min): 45 minutes    Assessment  Assessment details: Lisa M Gitlin is a 53 y.o. female who presents with signs and symptoms consistent of multifactorial pain involving the neck (left side > right), right jaw, and headaches. Pt reports experiencing symptoms of a migraine today. She also had a migraine last week that was present for 72 hours. Pt has an appointment with  neurology on  with Dr. Christianson. Upon assessment today, patient has demonstrated good improvements in cervical spine ROM but she continues to have pain with certain movements. Her jaw continues to present with an opening click and pain; however, she has more mobility and more control upon observation. Bilateral upper trapezius muscles still have noticeable increase in tone and trigger points. At this time, pt will be put on hold until follow up with neuro. However, I am recommending patient return in February for formal treatment if symptoms haven't resolved and neuro is in agreement.       Primary movement impairments:   1) Aberrant movements with all C/S ROM-Improving  2) Opening/closing click of the jaw with deflection to the Left -Present  3) Associated headaches with neck ROM -Present       Impairments: abnormal coordination, abnormal muscle firing, abnormal muscle tone, abnormal or restricted ROM, abnormal movement, activity intolerance, lacks appropriate home exercise program, pain with function, poor posture  and poor body mechanics  Understanding of Dx/Px/POC: good   Prognosis: good    Goals  Short Term Goals: to be achieved by 4 weeks  1) Patient to be independent with basic HEP.-Improving  2) Decrease pain to 3/10 at its  worst.-Not met  3) Increase cervical spine ROM by 5-10 degrees in all deficient planes.-Partially met   4) Improve Jaw ROM by 2-3 mm in each plane in pain free manner-Partially met  5) Reduce and or self manage jaw clicking during opening and closing. -Not met    Long Term Goals: to be achieved by discharge  1) FOTO equal to or greater than expected.NT  2) Patient to be independent with comprehensive HEP.-Partially met  3) Cervical spine ROM fluidity to no aberrant movements in all planes to improve a/iadls.-Partially met        Plan  Plan details: Habits to break:  Chewing gum and/or ice cubes, Eating hard candy, Leaning head in palms of hands, Reading with books below chest height, Blocking yawning    Patient would benefit from: PT eval and skilled physical therapy  Planned modality interventions: low level laser therapy  Planned therapy interventions: joint mobilization, manual therapy, neuromuscular re-education, patient education, therapeutic activities, therapeutic exercise and home exercise program  Frequency: Follow up for PT after neuro appointment       Subjective Evaluation    History of Present Illness  Mechanism of injury: History of Current Injury: Pt is a self referral to PT secondary to right sided jaw pain and neck pain.  Pt also complains of associated tension like headaches. Pt went to ED on 12/2 and was given Toradol to manage symptoms. Pt reports a recent increase of neck pain. She reports a history of neck pain which has worsened over the last month. Pt had an MRI of cervical spine in 2018 which identified DDD. Pt notes more pain with closing the jaw but does report an opening click. Pt does admit to bruxism.   Pain location/Descriptors: 1) Dull and tightness L>R posterior neck which radiates into the upper trap. 2)R jaw pain with temporal discomfort. 3)Headaches (temporal region and occipitalis)  Aggravating factors: chewing, opening, turning neck,   Easing factors: Toradol, heat, heat packs  from [facility], Lying down   24 HR pattern: Depending on activity. Worse in AM.   Imaging: Previous MRI of C/S   Special Questions: Milly denies a new onset of Bladder incontinence, Bowel dysfunction, Dizziness, Dysphagia, Dysarthria, Drop attacks, Diplopia, Tingling, Numbness, and Saddle anesthesia .      Patient goals:  Manage her symptoms.   Hobbies/Interest: Regularly working out, enjoys to travel  Occupation: Sales      Quality of life: good    Pain  Current pain ratin  At worst pain ratin      Objective     Active Range of Motion   Cervical/Thoracic Spine       Cervical    Flexion: 75 degrees   Extension: 78 degrees     with pain  Left lateral flexion: 50 degrees      Right lateral flexion: 45 degrees      Left rotation: 82 degrees  Right rotation: 85degrees       Additional Active Range of Motion Details  Poor fluidity of movement in CS/Aberrant movement in cervical spine (All planes of motion) with pain  *Extension  *lateral flexion  *L rotation    Tests   Cervical   Negative alar ligament test, Sharp-Maren test and VBI.     Additional Tests Details  Tenderness to palpation:   *B Masseter  *IMELDA  *B upper trapezius   TMJ   Joint sounds left: clicking, locking and popping  Joint sounds right: clicking, locking and popping  Opening (mm): 450 left deflection and left deflection   Lateral excursion, left (mm): 15  Lateral excursion, right (mm)t: 13  Protrusion (mm): 10        Precautions: Standard      Manuals        Assess C/S joint mobility Assessed see above            Assess OA and AA joints Assesssed             Masseter , temporalis STM B/L             T/S     Upper T/S gr II+III Upper T/S gr II+III        SOR C/ gentle traction x 4' C/ gentle traction x 4' C/ gentle traction x 4' C/ gentle traction x 4' C/ gentle traction x 4' C/ gentle traction x 4'       Cervical lateral glides B Gr II+III focused on C7 B Gr II+III focused on C7 B Gr II+III focused on C7 B Gr II+III  "focused on C7 B Gr II+III focused on C7 B Gr II+III focused on C7       UT STM in supine      5'        IASTM using LLL to B masseter  2' x2 2'x2 2'x2   2'x2       Neuro Re-Ed             C/S retraction 20x5\"   20x5\" 20x5\"        DNF hold 10x5\" 10x5\" with hand assist  (10x just nodding to improve control)  10x5\"          Cervical isometrics             TB ws  2x10 ytb            Rows mid and low 2x10 20# Universal             Controlled opening of jaw 20x 30x 3x10  30x 30x                    Ther Ex             UBE 3'/3' 3'/3' 3'/3'  2'/2' 2'/2'       LS stretch   5x10\"  5x10\" B          C/S rot snag      10x5\" B        Protraction 10x 10x5\" 10x5\"          Lateral deviation   10x5\" ea 10x5\"          C/S lat flexion     10x5\" B  10x5\" B       IMELDA stretch      10x5\"                     Ther Activity                                       Gait Training                                       Modalities                10' to finish  To start 10'                      1:1 with PT from 445-530pm            "

## 2024-01-11 NOTE — LETTER
2024    Mike Salgado DO  [address] [facility]  [address] [place] [id number]    Patient: Lisa M Gitlin   YOB: 1970   Date of Visit: 2024     Encounter Diagnosis     ICD-10-CM    1. TMJ tenderness, right  M26.621       2. Cervicalgia  M54.2           Dear Dr. Salgado:    Thank you for your recent referral of Lisa M Gitlin. Please review the attached evaluation summary from Milly's recent visit.     Please verify that you agree with the plan of care by signing the attached order.     If you have any questions or concerns, please do not hesitate to call.     I sincerely appreciate the opportunity to share in the care of one of your patients and hope to have another opportunity to work with you in the near future.       Sincerely,    Tutu Gray, PT      Referring Provider:      I certify that I have read the below Plan of Care and certify the need for these services furnished under this plan of treatment while under my care.                    Mike Salgado DO  [address] [facility]  [address] [place] [id number]  Via Fax: [phone]          [id number]    Today's date: 2024  Patient name: Lisa M Gitlin  : 1970  MRN: [id number]  Referring provider: Tutu Gray PT  Dx:   Encounter Diagnosis     ICD-10-CM    1. TMJ tenderness, right  M26.621       2. Cervicalgia  M54.2           Start Time: 1645  Stop Time: 1730  Total time in clinic (min): 45 minutes    Assessment  Assessment details: Lisa M Gitlin is a 53 y.o. female who presents with signs and symptoms consistent of multifactorial pain involving the neck (left side > right), right jaw, and headaches. Pt reports experiencing symptoms of a migraine today. She also had a migraine last week that was present for 72 hours. Pt has an appointment with  neurology on  with Dr. Christianson. Upon assessment today, patient has demonstrated good improvements in cervical spine ROM but she continues to have  pain with certain movements. Her jaw continues to present with an opening click and pain; however, she has more mobility and more control upon observation. Bilateral upper trapezius muscles still have noticeable increase in tone and trigger points. At this time, pt will be put on hold until follow up with neuro. However, I am recommending patient return in February for formal treatment if symptoms haven't resolved and neuro is in agreement.       Primary movement impairments:   1) Aberrant movements with all C/S ROM-Improving  2) Opening/closing click of the jaw with deflection to the Left -Present  3) Associated headaches with neck ROM -Present       Impairments: abnormal coordination, abnormal muscle firing, abnormal muscle tone, abnormal or restricted ROM, abnormal movement, activity intolerance, lacks appropriate home exercise program, pain with function, poor posture  and poor body mechanics  Understanding of Dx/Px/POC: good   Prognosis: good    Goals  Short Term Goals: to be achieved by 4 weeks  1) Patient to be independent with basic HEP.-Improving  2) Decrease pain to 3/10 at its worst.-Not met  3) Increase cervical spine ROM by 5-10 degrees in all deficient planes.-Partially met   4) Improve Jaw ROM by 2-3 mm in each plane in pain free manner-Partially met  5) Reduce and or self manage jaw clicking during opening and closing. -Not met    Long Term Goals: to be achieved by discharge  1) FOTO equal to or greater than expected.NT  2) Patient to be independent with comprehensive HEP.-Partially met  3) Cervical spine ROM fluidity to no aberrant movements in all planes to improve a/iadls.-Partially met        Plan  Plan details: Habits to break:  Chewing gum and/or ice cubes, Eating hard candy, Leaning head in palms of hands, Reading with books below chest height, Blocking yawning    Patient would benefit from: PT eval and skilled physical therapy  Planned modality interventions: low level laser therapy  Planned  therapy interventions: joint mobilization, manual therapy, neuromuscular re-education, patient education, therapeutic activities, therapeutic exercise and home exercise program  Frequency: Follow up for PT after neuro appointment       Subjective Evaluation    History of Present Illness  Mechanism of injury: History of Current Injury: Pt is a self referral to PT secondary to right sided jaw pain and neck pain.  Pt also complains of associated tension like headaches. Pt went to ED on  and was given Toradol to manage symptoms. Pt reports a recent increase of neck pain. She reports a history of neck pain which has worsened over the last month. Pt had an MRI of cervical spine in 2018 which identified DDD. Pt notes more pain with closing the jaw but does report an opening click. Pt does admit to bruxism.   Pain location/Descriptors: 1) Dull and tightness L>R posterior neck which radiates into the upper trap. 2)R jaw pain with temporal discomfort. 3)Headaches (temporal region and occipitalis)  Aggravating factors: chewing, opening, turning neck,   Easing factors: Toradol, heat, heat packs from [facility], Lying down   24 HR pattern: Depending on activity. Worse in AM.   Imaging: Previous MRI of C/S   Special Questions: Milly denies a new onset of Bladder incontinence, Bowel dysfunction, Dizziness, Dysphagia, Dysarthria, Drop attacks, Diplopia, Tingling, Numbness, and Saddle anesthesia .      Patient goals:  Manage her symptoms.   Hobbies/Interest: Regularly working out, enjoys to travel  Occupation: Sales      Quality of life: good    Pain  Current pain ratin  At worst pain ratin      Objective     Active Range of Motion   Cervical/Thoracic Spine       Cervical    Flexion: 75 degrees   Extension: 78 degrees     with pain  Left lateral flexion: 50 degrees      Right lateral flexion: 45 degrees      Left rotation: 82 degrees  Right rotation: 85degrees       Additional Active Range of Motion Details  Poor fluidity of  "movement in CS/Aberrant movement in cervical spine (All planes of motion) with pain  *Extension  *lateral flexion  *L rotation    Tests   Cervical   Negative alar ligament test, Sharp-Maren test and VBI.     Additional Tests Details  Tenderness to palpation:   *B Masseter  *IMELDA  *B upper trapezius   TMJ   Joint sounds left: clicking, locking and popping  Joint sounds right: clicking, locking and popping  Opening (mm): 450 left deflection and left deflection   Lateral excursion, left (mm): 15  Lateral excursion, right (mm)t: 13  Protrusion (mm): 10        Precautions: Standard      Manuals 12/14 12/19 12/27 1/2 1/8 1/11       Assess C/S joint mobility Assessed see above            Assess OA and AA joints Assesssed             Masseter , temporalis STM B/L             T/S     Upper T/S gr II+III Upper T/S gr II+III        SOR C/ gentle traction x 4' C/ gentle traction x 4' C/ gentle traction x 4' C/ gentle traction x 4' C/ gentle traction x 4' C/ gentle traction x 4'       Cervical lateral glides B Gr II+III focused on C7 B Gr II+III focused on C7 B Gr II+III focused on C7 B Gr II+III focused on C7 B Gr II+III focused on C7 B Gr II+III focused on C7       UT STM in supine      5'        IASTM using LLL to B masseter  2' x2 2'x2 2'x2   2'x2       Neuro Re-Ed             C/S retraction 20x5\"   20x5\" 20x5\"        DNF hold 10x5\" 10x5\" with hand assist  (10x just nodding to improve control)  10x5\"          Cervical isometrics             TB ws  2x10 ytb            Rows mid and low 2x10 20# Universal             Controlled opening of jaw 20x 30x 3x10  30x 30x                    Ther Ex             UBE 3'/3' 3'/3' 3'/3'  2'/2' 2'/2'       LS stretch   5x10\"  5x10\" B          C/S rot snag      10x5\" B        Protraction 10x 10x5\" 10x5\"          Lateral deviation   10x5\" ea 10x5\"          C/S lat flexion     10x5\" B  10x5\" B       IMELDA stretch      10x5\"                     Ther Activity                                     "   Gait Training                                       Modalities             MH   10' to finish  To start 10'                      1:1 with PT from [id number]

## 2024-01-15 ENCOUNTER — APPOINTMENT (OUTPATIENT)
Dept: PHYSICAL THERAPY | Facility: CLINIC | Age: 54
End: 2024-01-15
Payer: COMMERCIAL

## 2024-01-18 ENCOUNTER — APPOINTMENT (OUTPATIENT)
Dept: PHYSICAL THERAPY | Facility: CLINIC | Age: 54
End: 2024-01-18
Payer: COMMERCIAL

## 2024-01-22 ENCOUNTER — APPOINTMENT (OUTPATIENT)
Dept: PHYSICAL THERAPY | Facility: CLINIC | Age: 54
End: 2024-01-22
Payer: COMMERCIAL

## 2024-01-25 ENCOUNTER — APPOINTMENT (OUTPATIENT)
Dept: PHYSICAL THERAPY | Facility: CLINIC | Age: 54
End: 2024-01-25
Payer: COMMERCIAL

## 2024-01-29 ENCOUNTER — APPOINTMENT (OUTPATIENT)
Dept: PHYSICAL THERAPY | Facility: CLINIC | Age: 54
End: 2024-01-29
Payer: COMMERCIAL

## 2024-01-29 ENCOUNTER — OFFICE VISIT (OUTPATIENT)
Dept: NEUROLOGY | Facility: CLINIC | Age: 54
End: 2024-01-29
Payer: COMMERCIAL

## 2024-01-29 VITALS
HEIGHT: 66 IN | BODY MASS INDEX: 23.95 KG/M2 | TEMPERATURE: 97.8 F | SYSTOLIC BLOOD PRESSURE: 141 MMHG | DIASTOLIC BLOOD PRESSURE: 84 MMHG | WEIGHT: 149 LBS | HEART RATE: 71 BPM

## 2024-01-29 DIAGNOSIS — R90.82 WHITE MATTER ABNORMALITY ON MRI OF BRAIN: Primary | ICD-10-CM

## 2024-01-29 DIAGNOSIS — G43.109 MIGRAINE WITH AURA AND WITHOUT STATUS MIGRAINOSUS, NOT INTRACTABLE: ICD-10-CM

## 2024-01-29 DIAGNOSIS — R90.89 ABNORMAL FINDING ON MRI OF BRAIN: ICD-10-CM

## 2024-01-29 PROCEDURE — 99205 OFFICE O/P NEW HI 60 MIN: CPT | Performed by: PSYCHIATRY & NEUROLOGY

## 2024-01-29 RX ORDER — AMITRIPTYLINE HYDROCHLORIDE 10 MG/1
TABLET, FILM COATED ORAL
Qty: 90 TABLET | Refills: 3 | Status: SHIPPED | OUTPATIENT
Start: 2024-01-29

## 2024-01-29 RX ORDER — TIZANIDINE 2 MG/1
2 TABLET ORAL
Qty: 30 TABLET | Refills: 0 | Status: SHIPPED | OUTPATIENT
Start: 2024-01-29

## 2024-01-29 NOTE — PROGRESS NOTES
NEUROLOGY OUTPATIENT - NEW PATIENT VISIT NOTE        NAME: Lisa M Gitlin  MRN: [id number]  : 1970     TODAY'S DATE: 24         HISTORY OF PRESENT ILLNESS (HPI):    Ms. Gitlin is a 53 y.o. female presenting with Migraine      HEADACHE DESCRIPTION:  2 different headaches  Onset of headaches: gradual since she was 17 years  Location of headaches: bilateral and occipital--temples (right )  Radiation: No   Quality of the pain: sharp and shooting--pressure,   Intensity of the headache: At present: 5/10;  At its worst:  10/10  Duration of the headaches: 3-4 days  Frequency of the headaches:about 2-4 days per week  Presence of aura:  Yes, visual aura (black and white, royal blue color)  Associated symptoms with headaches: no vomiting , no light sensitivity , no sound sensitivity , and +nausea  Triggers:Yes, weather can be a factor, severe anxiety from the headache   Positional component: Yes   Alleviating factors: No   Any specific time of the day when get the headaches: no particular time of day    Family history of migraine headaches: Yes, migraine headaches in her brother  History of neck and head trauma: Yes, 8 concussions   Smoking status: No  Oral contraceptive use: No  TMJ ++      Life style factors:  -Hydration: adequate  -Sleep: Klonopin is helping her sleep  -Caffeine intake: 1 cup of coffee  -Alcohol intake: none allergic to alcohol     Impact of headches:  -Number of headaches in past 30 days: 15-18/30 days.   -Number of days missed per month because of headache: 2 days she sat during the meeting in the last 30 days.   -Number of ER visits for her headaches: Yes, anxiety attack and headache  in the last 30 days.       Treatment:  -Over the counter medications tried for headaches:      Tylenol and Advil once a day --do not help     -Prescription medications:  Abortive or Rescue Medications used:      Ubrelvy helped but the pain was still there.     Preventative or daily medications used for  "headaches:   No prophylactic medicine tried in the past           Red Flags for headache:  Age <5 or >50: Yes  First worst headaches: No  Maximal at intensity: No  Change in pattern: Yes, more frequent   New headache in pregnancy, cancer or immunocompromised patient: No  Loss of consciousness or convulsions: No  Headache triggered by exertion, Valsalva maneuver or sexual activity: No  Abnormal exam: please see below in Neurological examination.    OUTSIDE RECORDS:    historical medical records  Last Note - December 2018 with NP - She presented to the ED on 12/18/18 for complaints of paresthesias in the left arm, face and lip that were occurring intermittently for a few days. Not thought to be stroke related and she was discharged home. She returned to the ED the following day after the paresthesias became more constant and were then affecting her right side. She had a MRI brain and cervical spine completed which revealed some non specific white matter changes that were stable since imaging in 2015; severe left foraminal stenosis in the cervical spine at C3-4, but no central canal stenosis. CBC and CMP normal. She was again discharged home.    She started with numbness into the left neck, into the left face and left eyeball last Friday. On Saturday, she started having numbness in the bilateral hands and feet. No symptoms on Sunday. Monday, the symptoms came back.  On Wednesday, the right side started getting numb as well.   Since then, she is generally feeling better, today continues to have numbness in the right foot. But otherwise symptoms have resolved. No headaches with any of these symptoms.  Last migraine was 3 weeks ago. Felt \"not as strong\" and normal, but no definite weakness. Just felt tingling all over and lightheaded. + heart racing. Never had any similar symptoms before.       CURRENT OUTPATIENT MEDICATIONS:    Milly YU Gitlin has a current medication list which includes the following prescription(s): " "ascorbic acid, atenolol, clonazepam, estradiol, lumigan, magnesium gluconate, ondansetron, ubrogepant, cholecalciferol, calcium, cyclosporine, elderberry, and xarelto.       PHYSICAL EXAMINATION:   VITAL SIGNS:  height is 5' 6\" (1.676 m) and weight is 67.6 kg (149 lb). Her temporal temperature is 97.8 °F (36.6 °C). Her blood pressure is 141/84 and her pulse is 71.        NEUROLOGICAL EXAMINATION:    MENTAL STATUS EXAM: Alert, oriented to time place and person  CRANIAL NERVE EXAMINATION:  I Not tested   II Normal visual fields to finger counting.   II, Ill,  IV, VI Pupils were symmetric, briskly reactive. No afferent pupillary defect.  Eye movements are full without nystagmus. No ptosis.   V Facial sensation were intact   VII Facial movements were symmetrical    VIII Hearing was intact   IX, X Intact   XI Shoulder shrug and head turn was normal   XII Tongue protrusion is in the mid line.          MOTOR EXAM:        Arm Right  Left Leg Right  Left   Deltoid 5/5 5/5 lliopsoas 5/5 5/5   Biceps 5/5 5/5 Quads 5/5 5/5   Triceps 5/5 5/5 Hamstrings 5/5 5/5   Wrist Extension 5/5 5/5 Ankle Dorsi Flexion 5/5 5/5   Wrist Flexion 5/5 5/5 Ankle Plantar Flexion 5/5 5/5               DEEP TENDON REFLEXES:     Brachioradialis Biceps Triceps Patellar Achilles   Right 2+ 2+ 2+ 3+ 2+   Left 2+ 2+ 2+ 3+ 2+            SENSORY EXAMINATION:   Sensation to dull touch Intact     COORDINATION:   normal finger to nose testing     GAIT/STATION:   normal        DIAGNOSTIC STUDIES:   PERTINENT LABS:     No results found for: \"WBC\"       No results found for: \"LABGLUC\"  Lab Results   Component Value Date    CREATININE 0.74 12/21/2023        No results found for: \"TSH\", \"LV3UCRMJ\", \"LABRPR\", \"HEPBSAG\", \"HEPBSAB\", \"HEPCAB\"         NEUROIMAGING:     MRI brain 12/19/2018 as per radiology   Impression    Impression:    No change in nonspecific foci of T2/FLAIR high signal intensity in the cerebral  white matter, possibly reflecting signal changes " related to migraines, mild  chronic small vessel disease, or gliosis/demyelination.    No change in 4 mm focus of DWI high signal intensity in the right superior  frontal gyrus subcortical white matter, likely reflecting T2 shine through  effect rather than acute infarction.    No acute infarction identified.    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       MRI cervical spine 12/21/18-- Cervical spondylosis as discussed above. Severe left foraminal stenosis at C3-4, without significant change. No significant central canal stenosis.      ASSESSMENT AND PLAN:    Ms. Gitlin is a 53 y.o.female  presenting with migraine headaches. Patient  has a past medical history of Anxiety, Atrial fibrillation (HCC), Bradycardia, COVID (06/2022), Diverticulitis, Elevated liver enzymes, GERD (gastroesophageal reflux disease), Hyperlipidemia, Hypotension, Irregular heart beat, Migraines, Pneumonia, PONV (postoperative nausea and vomiting), and SVT (supraventricular tachycardia).. Neurological exam is concerning for brisk reflexes. Neuroimaging including MRI of the brain done in 2018 was showing nonspecific T2 hyperintensities in a DWI signal in the right superior frontal gyrus.  Likely differential at this time include migraine headaches, white matter abnormality and migraine headaches.         1. White matter abnormality on MRI of brain    - MRI brain w wo contrast; Future  - MRI cervical spine w wo contrast; Future  - amitriptyline (ELAVIL) 10 mg tablet; Start with 10 mg at bed time, if no relief and able to tolerate please increase the dose to 20 mg at bed time.  Dispense: 90 tablet; Refill: 3    2. Abnormal finding on MRI of brain    - MRI brain w wo contrast; Future  - MRI cervical spine w wo contrast; Future  - amitriptyline (ELAVIL) 10 mg tablet; Start with 10 mg at bed time, if no relief and able to tolerate please increase  the dose to 20 mg at bed time.  Dispense: 90 tablet; Refill: 3    3. Migraine with aura and without status migrainosus, not intractable    - MRI brain w wo contrast; Future  - MRI cervical spine w wo contrast; Future  - amitriptyline (ELAVIL) 10 mg tablet; Start with 10 mg at bed time, if no relief and able to tolerate please increase the dose to 20 mg at bed time.  Dispense: 90 tablet; Refill: 3  - tiZANidine (ZANAFLEX) 2 mg tablet; Take 1 tablet (2 mg total) by mouth daily at bedtime  Dispense: 30 tablet; Refill: 0            Return in about 2 months (around 3/29/2024).       The management plan was discussed in detail with the patient and all questions were answered.     Ms. Gitlin was encouraged to contact our office with any questions or concerns and to contact the clinic or go to the nearest emergency room if symptoms change or worsen.       I have spent a total of 63 min in reviewing and/or ordering tests, medications, or procedures, performing an examination or evaluation, reviewing pertinent history, counseling and educating the patient, referring and/or communicating with other health care professionals, documenting in the EMR and general coordination of care of Ms. Gitlin  today.           Breanna Christianson MD.   Staff Neurologist,   Neuroimmunology and Neuroinfectious disease  01/29/24     This report has been created through the use of voice recognition/text compilation software.  Typographical and content errors may occur with this process.  While efforts are made to detect and correct such errors, in some cases errors will persist.  For this reason, wording in this document should be considered in the proper context and not strictly verbatim.  If, when reviewing the document, an error is discovered, please let the office know at [phone]

## 2024-01-29 NOTE — PATIENT INSTRUCTIONS
Lifestyle adjustments. Irrespective of treatment modalities applied, trigger control and lifestyle modification are indispensable to the successful management of migraine. This is especially important in children, adolescents, or pregnant women where drug treatments must be especially limited.    Although there is no robust evidence for most of the recommendations, most are general health measures that, given the lack of adverse effects and the benefit for general well-being, we consider should be recommended in all patients.    Avoid triggers. Many patients attribute the onset or worsening of pain to specific triggers such as stress, sleep changes, food, or atmospheric changes, among others. It is even possible that the relationship occurs inversely, so that premonitory symptoms such as sleep disturbances and appetite 48 to 72 hours before the onset of pain can be misinterpreted by the patient as the trigger of migraine attacks. The therapeutic implications of this relationship are also unclear. There are possible triggers such as sleep deprivation, fasting, or certain foods that can be easily avoidable. But avoiding other triggers can lead to very restrictive lifestyles with a reduction in quality of life that does not outweigh the potential beneficial.    Sleep. Another complex relationship is headache and sleep. An excess or lack of sleep can trigger migraine attacks and at the same time rest is one of the most used treatments to improve the symptoms of the migraine attack. Additionally, migraine and other headaches occur comorbidly with sleep disorders. Patients with chronic migraine have a higher prevalence of sleep disorders, specifically poor sleep habits and nonrestorative rest.    Regarding general sleep measures for patients with headache, it is recommended to define regular sleep schedules that allow 8 hours of rest per day, insisting that they remain constant also during the weekend; have dinner 4 hours  before bedtime and avoid liquids in the last two hours; and eliminate naps and avoid using screens, television, reading, or listening to music in bed. A nonpharmacological intervention to improve sleep habits can improve headache frequency and even reverse chronic to episodic migraine.    Diet. In the scientific literature, but especially in the informative websites and magazines, multiple and varied diets are proposed that aim to reduce the frequency of headaches. There are two main approaches: elimination diets, which consist of suppressing potentially triggering foods such as chocolate, alcohol, cheese, nuts, or citrus fruits and diets that provide high or low amounts of certain components, ie, rich in vitamin B12, B6, or D or low in histamine, lactose, or fatty acids. The studies are not very rigorous and most do not have a control group (). In addition, it must be considered that food triggers were only associated with onset of headache in less than 10% of the participants.    Dietary recommendations for patients with migraine should be the same as for the general population with special emphasis on the prevention of obesity, which is a factor related to headache chronification. It is recommendable to have a varied diet, eating five meals a day to avoid periods of prolonged fasting and incorporating water intake to reach around 2.5 liters per day, which should be increased in case of physical activity or increase in temperature or humidity. Specific diets should be recommended solely based on whether there are other comorbidities in the patient.    Caffeine at moderate doses (< 400mg/day: equivalent to two cups of coffee) does not seem to have a negative effect on headache frequency although it should be taken regularly to avoid withdrawal headaches.    Although patients with migraine headaches and cluster headaches may be more susceptible to alcohol as a precipitant, there is no evidence to recommend  abstinence from alcohol in all patients. Individual predisposition and cultural factors must be considered.    Exercise. Aerobic exercise can prevent or reduce symptoms of multiple chronic diseases, including headache. With some methodological limitations, there are studies that demonstrate benefits of aerobic exercise as a therapeutic intervention to reduce the frequency and intensity of headaches, as well as the quality of life measured by questionnaires. Exercise can have a beneficial effect on headaches directly but also indirectly, improving sleep quality, mood, cardiovascular function, and preventing weight gain. In addition, it can improve the control of other diseases frequently comorbid with headache such as obesity, hypertension, anxiety, depression, or sleep disorders (). The clinical benefit of yoga as an add-on therapy in patients with episodic migraine has been demonstrated.         Vitamins for headache  Mg 400 mg daily for headache  RiboFlavin 400 mg daily for headache

## 2024-02-02 ENCOUNTER — HOSPITAL ENCOUNTER (OUTPATIENT)
Dept: MRI IMAGING | Facility: HOSPITAL | Age: 54
Discharge: HOME | End: 2024-02-02
Attending: PSYCHIATRY & NEUROLOGY
Payer: COMMERCIAL

## 2024-02-02 DIAGNOSIS — R90.82 WHITE MATTER ABNORMALITY ON MRI OF BRAIN: ICD-10-CM

## 2024-02-02 DIAGNOSIS — R90.89 ABNORMAL FINDING ON MRI OF BRAIN: ICD-10-CM

## 2024-02-02 DIAGNOSIS — G43.109 MIGRAINE WITH AURA AND WITHOUT STATUS MIGRAINOSUS, NOT INTRACTABLE: ICD-10-CM

## 2024-02-02 PROCEDURE — G1004 CDSM NDSC: HCPCS

## 2024-02-02 PROCEDURE — 70553 MRI BRAIN STEM W/O & W/DYE: CPT

## 2024-02-02 PROCEDURE — 72156 MRI NECK SPINE W/O & W/DYE: CPT

## 2024-02-02 PROCEDURE — A9585 GADOBUTROL INJECTION: HCPCS | Performed by: PSYCHIATRY & NEUROLOGY

## 2024-02-02 RX ORDER — GADOBUTROL 604.72 MG/ML
7 INJECTION INTRAVENOUS
Status: COMPLETED | OUTPATIENT
Start: 2024-02-02 | End: 2024-02-02

## 2024-02-02 RX ADMIN — GADOBUTROL 7 ML: 604.72 INJECTION INTRAVENOUS at 22:15

## 2024-02-08 ENCOUNTER — TELEPHONE (OUTPATIENT)
Dept: NEUROLOGY | Facility: CLINIC | Age: 54
End: 2024-02-08

## 2024-02-08 NOTE — TELEPHONE ENCOUNTER
Milly called to make sure that the MRI report will go in her chart when its completed. Advised yes it will and she can view via [employer]

## 2024-02-14 NOTE — TELEPHONE ENCOUNTER
VM 2/12 at 3:04 pm:    Hi, this is Lisa Gitlin. My date of birth is [birth year]. I saw Dr. Christianson, and I had a MRI 11 days ago. There is a bunch of stuff on there. I looked in MyChart. My family doctor said to mention that my YO for lupus or one of those, I'm not sure which diseases came back present. He said I might want to discuss that with him too. If someone could call me back and let me know when my MRI would be reviewed I would appreciate it. # [phone].  _______________________________________________    Pt did not read the message on the result note that Dr. Christianson sent.

## 2024-02-16 ENCOUNTER — PATIENT MESSAGE (OUTPATIENT)
Dept: NEUROLOGY | Facility: CLINIC | Age: 54
End: 2024-02-16

## 2024-03-02 ENCOUNTER — TELEPHONE (OUTPATIENT)
Dept: NEUROLOGY | Facility: CLINIC | Age: 54
End: 2024-03-02

## 2024-03-02 NOTE — TELEPHONE ENCOUNTER
Spoke with patient unforming her. That Dr. Christianson is not permanently in the bath office. Patient understood and agreed to switch appt to bath office. Address given to patient.

## 2024-03-08 ENCOUNTER — CONSULT (OUTPATIENT)
Dept: PAIN MEDICINE | Facility: CLINIC | Age: 54
End: 2024-03-08
Payer: COMMERCIAL

## 2024-03-08 VITALS
WEIGHT: 149 LBS | HEIGHT: 66 IN | HEART RATE: 73 BPM | SYSTOLIC BLOOD PRESSURE: 155 MMHG | DIASTOLIC BLOOD PRESSURE: 92 MMHG | BODY MASS INDEX: 23.95 KG/M2

## 2024-03-08 DIAGNOSIS — M47.812 CERVICAL SPONDYLOSIS: ICD-10-CM

## 2024-03-08 DIAGNOSIS — M54.2 NECK PAIN: ICD-10-CM

## 2024-03-08 DIAGNOSIS — M79.18 MYOFASCIAL PAIN: Primary | ICD-10-CM

## 2024-03-08 PROCEDURE — 99244 OFF/OP CNSLTJ NEW/EST MOD 40: CPT | Performed by: ANESTHESIOLOGY

## 2024-03-08 RX ORDER — TIZANIDINE 2 MG/1
2 TABLET ORAL 2 TIMES DAILY PRN
Qty: 60 TABLET | Refills: 1 | Status: SHIPPED | OUTPATIENT
Start: 2024-03-08
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
## 2024-03-29 ENCOUNTER — OFFICE VISIT (OUTPATIENT)
Age: 54
End: 2024-03-29
Payer: COMMERCIAL

## 2024-03-29 VITALS
HEIGHT: 66 IN | OXYGEN SATURATION: 97 % | BODY MASS INDEX: 24.11 KG/M2 | DIASTOLIC BLOOD PRESSURE: 72 MMHG | WEIGHT: 150 LBS | SYSTOLIC BLOOD PRESSURE: 110 MMHG | HEART RATE: 56 BPM

## 2024-03-29 DIAGNOSIS — G43.109 MIGRAINE WITH AURA AND WITHOUT STATUS MIGRAINOSUS, NOT INTRACTABLE: Primary | ICD-10-CM

## 2024-03-29 PROCEDURE — 99215 OFFICE O/P EST HI 40 MIN: CPT | Performed by: PSYCHIATRY & NEUROLOGY

## 2024-03-29 RX ORDER — LATANOPROST 50 UG/ML
SOLUTION/ DROPS OPHTHALMIC
COMMUNITY
Start: 2024-02-08

## 2024-03-29 RX ORDER — VALACYCLOVIR HYDROCHLORIDE 500 MG/1
TABLET, FILM COATED ORAL
COMMUNITY
Start: 2023-10-06

## 2024-03-29 RX ORDER — METRONIDAZOLE 375 MG/1
375 CAPSULE ORAL 2 TIMES DAILY
COMMUNITY
Start: 2024-02-15

## 2024-03-29 RX ORDER — CIPROFLOXACIN 500 MG/1
TABLET, FILM COATED ORAL
COMMUNITY
Start: 2024-02-14
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
## 2024-04-02 ENCOUNTER — APPOINTMENT (EMERGENCY)
Dept: MRI IMAGING | Facility: HOSPITAL | Age: 54
End: 2024-04-02
Payer: OTHER MISCELLANEOUS

## 2024-04-02 ENCOUNTER — HOSPITAL ENCOUNTER (EMERGENCY)
Facility: HOSPITAL | Age: 54
Discharge: HOME | End: 2024-04-02
Attending: EMERGENCY MEDICINE
Payer: OTHER MISCELLANEOUS

## 2024-04-02 ENCOUNTER — APPOINTMENT (EMERGENCY)
Dept: RADIOLOGY | Facility: HOSPITAL | Age: 54
End: 2024-04-02
Payer: OTHER MISCELLANEOUS

## 2024-04-02 ENCOUNTER — APPOINTMENT (EMERGENCY)
Dept: CT IMAGING | Facility: HOSPITAL | Age: 54
End: 2024-04-02
Payer: OTHER MISCELLANEOUS

## 2024-04-02 VITALS
SYSTOLIC BLOOD PRESSURE: 151 MMHG | DIASTOLIC BLOOD PRESSURE: 83 MMHG | TEMPERATURE: 98 F | RESPIRATION RATE: 18 BRPM | HEART RATE: 71 BPM | OXYGEN SATURATION: 98 %

## 2024-04-02 DIAGNOSIS — S13.4XXA WHIPLASH INJURY TO NECK, INITIAL ENCOUNTER: ICD-10-CM

## 2024-04-02 DIAGNOSIS — M48.02 FORAMINAL STENOSIS OF CERVICAL REGION: ICD-10-CM
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
## 2024-04-02 DIAGNOSIS — M47.9 SPONDYLOSIS: ICD-10-CM

## 2024-04-02 LAB
ALBUMIN SERPL BCP-MCNC: 4.8 G/DL (ref 3.5–5)
ALP SERPL-CCNC: 76 U/L (ref 34–104)
ALT SERPL W P-5'-P-CCNC: 15 U/L (ref 7–52)
ANION GAP SERPL CALCULATED.3IONS-SCNC: 10 MMOL/L (ref 4–13)
AST SERPL W P-5'-P-CCNC: 18 U/L (ref 13–39)
BASOPHILS # BLD AUTO: 0.02 THOUSANDS/ÂΜL (ref 0–0.1)
BASOPHILS NFR BLD AUTO: 0 % (ref 0–1)
BILIRUB SERPL-MCNC: 0.59 MG/DL (ref 0.2–1)
BILIRUB UR QL STRIP: NEGATIVE
BUN SERPL-MCNC: 16 MG/DL (ref 5–25)
CALCIUM SERPL-MCNC: 9.9 MG/DL (ref 8.4–10.2)
CARDIAC TROPONIN I PNL SERPL HS: 4 NG/L
CHLORIDE SERPL-SCNC: 102 MMOL/L (ref 96–108)
CLARITY UR: CLEAR
CO2 SERPL-SCNC: 28 MMOL/L (ref 21–32)
COLOR UR: COLORLESS
CREAT SERPL-MCNC: 0.81 MG/DL (ref 0.6–1.3)
EOSINOPHIL # BLD AUTO: 0.1 THOUSAND/ÂΜL (ref 0–0.61)
EOSINOPHIL NFR BLD AUTO: 2 % (ref 0–6)
ERYTHROCYTE [DISTWIDTH] IN BLOOD BY AUTOMATED COUNT: 12.4 % (ref 11.6–15.1)
GFR SERPL CREATININE-BSD FRML MDRD: 82 ML/MIN/1.73SQ M
GLUCOSE SERPL-MCNC: 106 MG/DL (ref 65–140)
GLUCOSE UR STRIP-MCNC: NEGATIVE MG/DL
HCT VFR BLD AUTO: 43.6 % (ref 34.8–46.1)
HGB BLD-MCNC: 14.4 G/DL (ref 11.5–15.4)
HGB UR QL STRIP.AUTO: NEGATIVE
IMM GRANULOCYTES # BLD AUTO: 0.01 THOUSAND/UL (ref 0–0.2)
IMM GRANULOCYTES NFR BLD AUTO: 0 % (ref 0–2)
KETONES UR STRIP-MCNC: NEGATIVE MG/DL
LEUKOCYTE ESTERASE UR QL STRIP: NEGATIVE
LYMPHOCYTES # BLD AUTO: 2.58 THOUSANDS/ÂΜL (ref 0.6–4.47)
LYMPHOCYTES NFR BLD AUTO: 49 % (ref 14–44)
MCH RBC QN AUTO: 30.6 PG (ref 26.8–34.3)
MCHC RBC AUTO-ENTMCNC: 33 G/DL (ref 31.4–37.4)
MCV RBC AUTO: 93 FL (ref 82–98)
MONOCYTES # BLD AUTO: 0.29 THOUSAND/ÂΜL (ref 0.17–1.22)
MONOCYTES NFR BLD AUTO: 5 % (ref 4–12)
NEUTROPHILS # BLD AUTO: 2.39 THOUSANDS/ÂΜL (ref 1.85–7.62)
NEUTS SEG NFR BLD AUTO: 44 % (ref 43–75)
NITRITE UR QL STRIP: NEGATIVE
NRBC BLD AUTO-RTO: 0 /100 WBCS
PH UR STRIP.AUTO: 5.5 [PH]
PLATELET # BLD AUTO: 218 THOUSANDS/UL (ref 149–390)
PMV BLD AUTO: 9.9 FL (ref 8.9–12.7)
POTASSIUM SERPL-SCNC: 3.6 MMOL/L (ref 3.5–5.3)
PROT SERPL-MCNC: 8 G/DL (ref 6.4–8.4)
PROT UR STRIP-MCNC: NEGATIVE MG/DL
RBC # BLD AUTO: 4.7 MILLION/UL (ref 3.81–5.12)
SODIUM SERPL-SCNC: 140 MMOL/L (ref 135–147)
SP GR UR STRIP.AUTO: 1 (ref 1–1.03)
UROBILINOGEN UR STRIP-ACNC: <2 MG/DL
WBC # BLD AUTO: 5.39 THOUSAND/UL (ref 4.31–10.16)

## 2024-04-02 PROCEDURE — 81003 URINALYSIS AUTO W/O SCOPE: CPT

## 2024-04-02 PROCEDURE — 93005 ELECTROCARDIOGRAM TRACING: CPT

## 2024-04-02 PROCEDURE — 99285 EMERGENCY DEPT VISIT HI MDM: CPT

## 2024-04-02 PROCEDURE — 72141 MRI NECK SPINE W/O DYE: CPT

## 2024-04-02 PROCEDURE — 99285 EMERGENCY DEPT VISIT HI MDM: CPT | Performed by: EMERGENCY MEDICINE

## 2024-04-02 PROCEDURE — 72125 CT NECK SPINE W/O DYE: CPT

## 2024-04-02 PROCEDURE — 85025 COMPLETE CBC W/AUTO DIFF WBC: CPT | Performed by: EMERGENCY MEDICINE

## 2024-04-02 PROCEDURE — 80053 COMPREHEN METABOLIC PANEL: CPT | Performed by: EMERGENCY MEDICINE

## 2024-04-02 PROCEDURE — 96375 TX/PRO/DX INJ NEW DRUG ADDON: CPT

## 2024-04-02 PROCEDURE — 70450 CT HEAD/BRAIN W/O DYE: CPT

## 2024-04-02 PROCEDURE — 71045 X-RAY EXAM CHEST 1 VIEW: CPT

## 2024-04-02 PROCEDURE — 96374 THER/PROPH/DIAG INJ IV PUSH: CPT

## 2024-04-02 PROCEDURE — 36415 COLL VENOUS BLD VENIPUNCTURE: CPT

## 2024-04-02 PROCEDURE — 84484 ASSAY OF TROPONIN QUANT: CPT | Performed by: EMERGENCY MEDICINE

## 2024-04-02 RX ORDER — METHOCARBAMOL 500 MG/1
500 TABLET, FILM COATED ORAL 2 TIMES DAILY
Qty: 20 TABLET | Refills: 0 | Status: SHIPPED | OUTPATIENT
Start: 2024-04-02 | End: 2024-04-02 | Stop reason: SDUPTHER

## 2024-04-02 RX ORDER — LIDOCAINE 50 MG/G
1 PATCH TOPICAL ONCE
Status: DISCONTINUED | OUTPATIENT
Start: 2024-04-02 | End: 2024-04-02 | Stop reason: HOSPADM

## 2024-04-02 RX ORDER — DIAZEPAM 5 MG/ML
5 INJECTION, SOLUTION INTRAMUSCULAR; INTRAVENOUS ONCE
Status: DISCONTINUED | OUTPATIENT
Start: 2024-04-02 | End: 2024-04-02

## 2024-04-02 RX ORDER — FENTANYL CITRATE 50 UG/ML
50 INJECTION, SOLUTION INTRAMUSCULAR; INTRAVENOUS ONCE
Status: COMPLETED | OUTPATIENT
Start: 2024-04-02 | End: 2024-04-02

## 2024-04-02 RX ORDER — ONDANSETRON 2 MG/ML
4 INJECTION INTRAMUSCULAR; INTRAVENOUS ONCE
Status: COMPLETED | OUTPATIENT
Start: 2024-04-02 | End: 2024-04-02

## 2024-04-02 RX ORDER — DIAZEPAM 5 MG/ML
2.5 INJECTION, SOLUTION INTRAMUSCULAR; INTRAVENOUS ONCE
Status: COMPLETED | OUTPATIENT
Start: 2024-04-02 | End: 2024-04-02

## 2024-04-02 RX ORDER — KETOROLAC TROMETHAMINE 30 MG/ML
15 INJECTION, SOLUTION INTRAMUSCULAR; INTRAVENOUS ONCE
Status: COMPLETED | OUTPATIENT
Start: 2024-04-02 | End: 2024-04-02

## 2024-04-02 RX ADMIN — DIAZEPAM 2.5 MG: 10 INJECTION, SOLUTION INTRAMUSCULAR; INTRAVENOUS at 15:36

## 2024-04-02 RX ADMIN — FENTANYL CITRATE 50 MCG: 50 INJECTION INTRAMUSCULAR; INTRAVENOUS at 15:37

## 2024-04-02 RX ADMIN — ONDANSETRON 4 MG: 2 INJECTION INTRAMUSCULAR; INTRAVENOUS at 15:32

## 2024-04-02 RX ADMIN — LIDOCAINE 5% 1 PATCH: 700 PATCH TOPICAL at 18:17

## 2024-04-02 RX ADMIN — KETOROLAC TROMETHAMINE 15 MG: 30 INJECTION, SOLUTION INTRAMUSCULAR; INTRAVENOUS at 15:34

## 2024-04-02 NOTE — Clinical Note
Lisa Gitlin was seen and treated in our emergency department on 4/2/2024.    No restrictions            Diagnosis:     Milyl  .    She may return on this date: 04/04/2024         If you have any questions or concerns, please don't hesitate to call.      Keyonna Pandey MD    ______________________________           _______________          _______________  Hospital Representative                              Date                                Time
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
## 2024-04-02 NOTE — Clinical Note
Lisa Gitlin was seen and treated in our emergency department on 4/2/2024.    No restrictions            Diagnosis:     Milly  .    She may return on this date: 04/04/2024         If you have any questions or concerns, please don't hesitate to call.      Keyonna Pandey MD    ______________________________           _______________          _______________  Hospital Representative                              Date                                Time

## 2024-04-02 NOTE — ED NOTES
Patient ambulated approximately 20 feet independently to the restroom with steady gait.     Dyan Chun RN  04/02/24 0763

## 2024-04-03 LAB
ATRIAL RATE: 77 BPM
P AXIS: 25 DEGREES
PR INTERVAL: 156 MS
QRS AXIS: 89 DEGREES
QRSD INTERVAL: 80 MS
QT INTERVAL: 366 MS
QTC INTERVAL: 414 MS
T WAVE AXIS: 47 DEGREES
VENTRICULAR RATE: 77 BPM

## 2024-04-03 PROCEDURE — 93010 ELECTROCARDIOGRAM REPORT: CPT | Performed by: INTERNAL MEDICINE
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
## 2024-04-10 ENCOUNTER — APPOINTMENT (OUTPATIENT)
Dept: URGENT CARE | Facility: MEDICAL CENTER | Age: 54
End: 2024-04-10
Payer: OTHER MISCELLANEOUS

## 2024-04-10 PROCEDURE — 99214 OFFICE O/P EST MOD 30 MIN: CPT | Performed by: NURSE PRACTITIONER

## 2024-04-16 ENCOUNTER — OCCMED (OUTPATIENT)
Dept: URGENT CARE | Facility: MEDICAL CENTER | Age: 54
End: 2024-04-16
Payer: OTHER MISCELLANEOUS

## 2024-04-16 ENCOUNTER — APPOINTMENT (OUTPATIENT)
Dept: RADIOLOGY | Facility: MEDICAL CENTER | Age: 54
End: 2024-04-16
Payer: OTHER MISCELLANEOUS

## 2024-04-16 DIAGNOSIS — S39.012A STRAIN OF MUSCLE, FASCIA AND TENDON OF LOWER BACK, INITIAL ENCOUNTER: ICD-10-CM

## 2024-04-16 DIAGNOSIS — S39.012A STRAIN OF MUSCLE, FASCIA AND TENDON OF LOWER BACK, INITIAL ENCOUNTER: Primary | ICD-10-CM

## 2024-04-16 PROCEDURE — 72100 X-RAY EXAM L-S SPINE 2/3 VWS: CPT

## 2024-04-16 PROCEDURE — 99213 OFFICE O/P EST LOW 20 MIN: CPT | Performed by: NURSE PRACTITIONER
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
## 2024-04-19 ENCOUNTER — OFFICE VISIT (OUTPATIENT)
Dept: PAIN MEDICINE | Facility: CLINIC | Age: 54
End: 2024-04-19
Payer: COMMERCIAL

## 2024-04-19 VITALS
HEIGHT: 66 IN | SYSTOLIC BLOOD PRESSURE: 104 MMHG | DIASTOLIC BLOOD PRESSURE: 68 MMHG | BODY MASS INDEX: 24.11 KG/M2 | WEIGHT: 150 LBS | HEART RATE: 70 BPM

## 2024-04-19 DIAGNOSIS — M79.18 MYOFASCIAL PAIN: ICD-10-CM

## 2024-04-19 DIAGNOSIS — M54.2 NECK PAIN: ICD-10-CM

## 2024-04-19 DIAGNOSIS — M47.812 CERVICAL SPONDYLOSIS: Primary | ICD-10-CM

## 2024-04-19 PROCEDURE — 99214 OFFICE O/P EST MOD 30 MIN: CPT | Performed by: NURSE PRACTITIONER

## 2024-04-22 ENCOUNTER — APPOINTMENT (OUTPATIENT)
Dept: URGENT CARE | Facility: MEDICAL CENTER | Age: 54
End: 2024-04-22
Payer: OTHER MISCELLANEOUS

## 2024-04-22 PROCEDURE — 99213 OFFICE O/P EST LOW 20 MIN: CPT | Performed by: NURSE PRACTITIONER

## 2024-05-22 ENCOUNTER — TELEPHONE (OUTPATIENT)
Dept: NEUROLOGY | Facility: CLINIC | Age: 54
End: 2024-05-22

## 2024-05-22 NOTE — TELEPHONE ENCOUNTER
Patient wants to schedule with Dr Christianson sooner than later. She is schedule in July. But has some concerns with headache, tingling and numbness. She states it feels like she has spiders crawling in her head.    LOV- 3/29/24

## 2024-05-29 NOTE — TELEPHONE ENCOUNTER
Thank you Sanjana,     Yes I can see her for a sooner appointment.     Thank you,     Dr. Sammy MD.   05/29/24   4:56 PM

## 2024-06-04 ENCOUNTER — EVALUATION (OUTPATIENT)
Dept: PHYSICAL THERAPY | Facility: CLINIC | Age: 54
End: 2024-06-04
Payer: COMMERCIAL

## 2024-06-04 DIAGNOSIS — M54.41 ACUTE BILATERAL LOW BACK PAIN WITH RIGHT-SIDED SCIATICA: Primary | ICD-10-CM

## 2024-06-04 DIAGNOSIS — R20.2 PARESTHESIA: ICD-10-CM

## 2024-06-04 PROCEDURE — 97110 THERAPEUTIC EXERCISES: CPT | Performed by: PHYSICAL THERAPIST

## 2024-06-04 PROCEDURE — 97163 PT EVAL HIGH COMPLEX 45 MIN: CPT | Performed by: PHYSICAL THERAPIST
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
## 2024-06-06 ENCOUNTER — OFFICE VISIT (OUTPATIENT)
Age: 54
End: 2024-06-06
Payer: COMMERCIAL

## 2024-06-06 VITALS
HEART RATE: 60 BPM | WEIGHT: 144 LBS | SYSTOLIC BLOOD PRESSURE: 111 MMHG | BODY MASS INDEX: 23.14 KG/M2 | DIASTOLIC BLOOD PRESSURE: 80 MMHG | HEIGHT: 66 IN

## 2024-06-06 DIAGNOSIS — G62.9 PERIPHERAL NEUROPATHY: ICD-10-CM

## 2024-06-06 DIAGNOSIS — G43.109 MIGRAINE WITH AURA AND WITHOUT STATUS MIGRAINOSUS, NOT INTRACTABLE: Primary | ICD-10-CM

## 2024-06-06 PROCEDURE — 99215 OFFICE O/P EST HI 40 MIN: CPT | Performed by: PSYCHIATRY & NEUROLOGY

## 2024-06-06 RX ORDER — GABAPENTIN 100 MG/1
100 CAPSULE ORAL
Qty: 90 CAPSULE | Refills: 2 | Status: SHIPPED | OUTPATIENT
Start: 2024-06-06
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
## 2024-06-10 ENCOUNTER — OFFICE VISIT (OUTPATIENT)
Dept: PHYSICAL THERAPY | Facility: CLINIC | Age: 54
End: 2024-06-10
Payer: COMMERCIAL

## 2024-06-10 DIAGNOSIS — M54.41 ACUTE BILATERAL LOW BACK PAIN WITH RIGHT-SIDED SCIATICA: Primary | ICD-10-CM

## 2024-06-10 DIAGNOSIS — R20.2 PARESTHESIA: ICD-10-CM

## 2024-06-10 PROCEDURE — 97110 THERAPEUTIC EXERCISES: CPT

## 2024-06-10 PROCEDURE — 97112 NEUROMUSCULAR REEDUCATION: CPT

## 2024-06-10 NOTE — PROGRESS NOTES
"Daily Note     Today's date: 6/10/2024  Patient name: Lisa M Gitlin  : 1970  MRN: [id number]  Referring provider: Mike Salgado DO  Dx:   Encounter Diagnosis     ICD-10-CM    1. Acute bilateral low back pain with right-sided sciatica  M54.41       2. Paresthesia  R20.2                      Subjective: Pt feels exercises given for HEP are \"making her sx worse\".  Pt also c/o increased R LBP upon waking this morning.       Objective: See treatment diary below      Assessment: Tolerated treatment well. Patient exhibited good technique with therapeutic exercises and would benefit from continued PT.  No increased lumbar discomfort noted w/ supine nerve glides; however, pt notes increased in intensity of paresthesias. No adverse effects, no increase in radicular sx noted w/ STM.  Quad fatigue noted w/ supine SLR.  Tolerates addition of KRISTY well.       Plan: Progress treatment as tolerated.       Precautions: A-Fib, SVT, Anxiety, Migraines, Myofasical pain    1:1 4:02-4:30  Manuals  6/10           CPA of L/S             STM to lumbar paraspinals  LM                                     Neuro Re-Ed             90/90 nerve glides  W/ towel 2x10 Point toe           Slump nerve glides             Bridge  2x10           TrA SLR  2x10           TrA sidelying hip abduction             Prone hip extension  nv           Bird dog              Ther Ex             Elliptical/woodway/bike  Bike 5'           SB stretch  20\"x4 R           LTR  10x5\"ea           Prone press up   x10           Quad stretch              KRISTY   3 min                                     Ther Activity                                       Gait Training                                         Modalities                                            "

## 2024-06-13 ENCOUNTER — OFFICE VISIT (OUTPATIENT)
Dept: PHYSICAL THERAPY | Facility: CLINIC | Age: 54
End: 2024-06-13
Payer: COMMERCIAL

## 2024-06-13 DIAGNOSIS — M54.41 ACUTE BILATERAL LOW BACK PAIN WITH RIGHT-SIDED SCIATICA: Primary | ICD-10-CM

## 2024-06-13 DIAGNOSIS — R20.2 PARESTHESIA: ICD-10-CM

## 2024-06-13 PROCEDURE — 97112 NEUROMUSCULAR REEDUCATION: CPT

## 2024-06-13 PROCEDURE — 97140 MANUAL THERAPY 1/> REGIONS: CPT

## 2024-06-13 PROCEDURE — 97110 THERAPEUTIC EXERCISES: CPT

## 2024-06-13 NOTE — PROGRESS NOTES
"Daily Note     Today's date: 2024  Patient name: Lisa M Gitlin  : 1970  MRN: [id number]  Referring provider: Mike Salgado DO  Dx:   Encounter Diagnosis     ICD-10-CM    1. Acute bilateral low back pain with right-sided sciatica  M54.41       2. Paresthesia  R20.2                      Subjective: Pt reports no change in radicular sx. Pt reports she feels better lying down, sleeping.  Pt notes increased sx w/ prolonged sitting.       Objective: See treatment diary below      Assessment: Tolerated treatment well. Patient exhibited good technique with therapeutic exercises and would benefit from continued PT.  Challenged by supine R SLR due to fatigue, tightness; all other TE tolerated well.  TTP R piriformis noted. TTP also noted w/ .  PT, AL educated pt on precautions w/ using heat at home, and to stop use w/ increase in sx.        Plan: Progress treatment as tolerated.       Precautions: A-Fib, SVT, Anxiety, Migraines, Myofasical pain    1:1 4:22-5:00pm  Manuals  6/10 6/13          CPA of L/S   Gr II (PT,  AL)          STM to lumbar paraspinals  LM LM                                    Neuro Re-Ed             90/90 nerve glides  W/ towel 2x10 Point toe 2x10          Slump nerve glides             Bridge  2x10 2x10          TrA SLR  2x10 2x10          TrA sidelying hip abduction             Prone hip extension  nv X10 ea          Bird dog              Ther Ex             Elliptical/woodway/bike  Bike 5' Bike 5'          SB stretch  20\"x4 R 20\"x4  ea          LTR  10x5\"ea 10x5\" ea          Prone press up   x10 x10          Quad stretch              KRISTY   3 min 2 min                                    Ther Activity                                       Gait Training                                         Modalities                                              "

## 2024-06-17 ENCOUNTER — OFFICE VISIT (OUTPATIENT)
Dept: PHYSICAL THERAPY | Facility: CLINIC | Age: 54
End: 2024-06-17
Payer: COMMERCIAL

## 2024-06-17 DIAGNOSIS — R20.2 PARESTHESIA: ICD-10-CM

## 2024-06-17 DIAGNOSIS — M54.41 ACUTE BILATERAL LOW BACK PAIN WITH RIGHT-SIDED SCIATICA: Primary | ICD-10-CM

## 2024-06-17 PROCEDURE — 97112 NEUROMUSCULAR REEDUCATION: CPT | Performed by: PHYSICAL THERAPIST

## 2024-06-17 PROCEDURE — 97110 THERAPEUTIC EXERCISES: CPT | Performed by: PHYSICAL THERAPIST

## 2024-06-17 PROCEDURE — 97140 MANUAL THERAPY 1/> REGIONS: CPT | Performed by: PHYSICAL THERAPIST

## 2024-06-17 NOTE — PROGRESS NOTES
"Daily Note     Today's date: 2024  Patient name: Lisa M Gitlin  : 1970  MRN: [id number]  Referring provider: Mike Salgado DO  Dx:   Encounter Diagnosis     ICD-10-CM    1. Acute bilateral low back pain with right-sided sciatica  M54.41       2. Paresthesia  R20.2                      Subjective: Pt tried gabapentin on Friday but she had a bad reaction. She felt anxious, was not able to sleep, and felt dizzy. She reports increasing pain in the RLE down to her foot. Also notes tenderness in gastroc after working on ankle exercises.       Objective: See treatment diary below      Assessment: Mild reduction in symptoms in LE with centralization with KRISTY and prone progression. Implemented quad stretching in prone with a strap. Pt still fairly tenderness across the lumbar spine with STM and gentle joint mobilizations; however, no radiation of symptoms in the LE with manual intervention. Tolerated treatment fair. Patient would benefit from continued PT. Also discussed the lack of correlation between nerve sensitivity/pain and the state of health of muscle tissue. Encouraged patient to go for frequent walks, continues with prone press ups, and focus on HEP.       Plan: Continue per plan of care.      Precautions: A-Fib, SVT, Anxiety, Migraines, Myofasical pain    1:1 4:22-5:00pm  Manuals  6/10 6/13 6/17         CPA of L/S   Gr II (PT,  AL) Gr II         STM to lumbar paraspinals  LM LM JK                                    Neuro Re-Ed             90/90 nerve glides  W/ towel 2x10 Point toe 2x10 10x on ea 2 sets         Slump nerve glides             Bridge  2x10 2x10 2x10          TrA SLR  2x10 2x10          TrA sidelying hip abduction             Prone hip extension  nv X10 ea          Bird dog              Ther Ex             Elliptical/woodway/bike  Bike 5' Bike 5'          SB stretch  20\"x4 R 20\"x4  ea Long sitting with strap 5x10\"  B         LTR  10x5\"ea 10x5\" ea 10x5\"          Prone press up   x10 x10 " "10x3\" 2 sets         Quad stretch     5x10\" B         KRISTY   3 min 2 min 2 min                                    Ther Activity                                       Gait Training                                         Modalities                                       1:1 with PT from [id number]         "

## 2024-06-20 ENCOUNTER — OFFICE VISIT (OUTPATIENT)
Dept: PHYSICAL THERAPY | Facility: CLINIC | Age: 54
End: 2024-06-20
Payer: COMMERCIAL

## 2024-06-20 DIAGNOSIS — R20.2 PARESTHESIA: ICD-10-CM

## 2024-06-20 DIAGNOSIS — M54.41 ACUTE BILATERAL LOW BACK PAIN WITH RIGHT-SIDED SCIATICA: Primary | ICD-10-CM

## 2024-06-20 PROCEDURE — 97140 MANUAL THERAPY 1/> REGIONS: CPT | Performed by: PHYSICAL THERAPIST

## 2024-06-20 PROCEDURE — 97112 NEUROMUSCULAR REEDUCATION: CPT | Performed by: PHYSICAL THERAPIST

## 2024-06-20 PROCEDURE — 97012 MECHANICAL TRACTION THERAPY: CPT | Performed by: PHYSICAL THERAPIST

## 2024-06-20 PROCEDURE — 97110 THERAPEUTIC EXERCISES: CPT | Performed by: PHYSICAL THERAPIST

## 2024-06-20 NOTE — PROGRESS NOTES
"Daily Note     Today's date: 2024  Patient name: Lisa M Gitlin  : 1970  MRN: [id number]  Referring provider: Mike Salgado DO  Dx:   Encounter Diagnosis     ICD-10-CM    1. Acute bilateral low back pain with right-sided sciatica  M54.41       2. Paresthesia  R20.2                      Subjective: Pt reports minimal improvement in symptoms. Pain still present with driving and sitting. Pt is completing her HEP daily. She plans on communicating this with her PCP today.       Objective: See treatment diary below      Assessment: Attempted mechanical intermittent lumbar traction and IASTM using LLL. Mild relief with treamtent today. Pt was very tender to palpation to R SIJ region. Performed sacral rocking to reduce discomfort. Encouraged patient to continue with nerve glides, walking, and core stability program. Tolerated treatment fair. Patient would benefit from continued PT.      Plan: Continue per plan of care.      Precautions: A-Fib, SVT, Anxiety, Migraines, Myofasical pain    Manuals  6/10 6/13 6/17 6/20        CPA of L/S   Gr II (PT,  AL) Gr II Gr II with SI joint rocking        STM to lumbar paraspinals  LM LM JK  Using LLL x4'                                   Neuro Re-Ed             90/90 nerve glides  W/ towel 2x10 Point toe 2x10 10x on ea 2 sets 10x on ea 2 sets        Slump nerve glides             Bridge  2x10 2x10 2x10  2x10         TrA SLR  2x10 2x10          TrA sidelying hip abduction             Prone hip extension  nv X10 ea          Bird dog              Ther Ex             Elliptical/woodway/bike  Bike 5' Bike 5'  7' lvl 2        SB stretch  20\"x4 R 20\"x4  ea Long sitting with strap 5x10\"  B         LTR  10x5\"ea 10x5\" ea 10x5\"  10x5\"         Prone press up   x10 x10 10x3\" 2 sets 10x 3\"         Quad stretch     5x10\" B         KRISTY   3 min 2 min 2 min                                    Ther Activity                                       Gait Training                                    "      Modalities             Intermittent traction     45-50# 10'                     1:1 with PT from [phone]

## 2024-06-25 ENCOUNTER — OFFICE VISIT (OUTPATIENT)
Dept: PHYSICAL THERAPY | Facility: CLINIC | Age: 54
End: 2024-06-25
Payer: COMMERCIAL

## 2024-06-25 ENCOUNTER — TELEPHONE (OUTPATIENT)
Age: 54
End: 2024-06-25

## 2024-06-25 DIAGNOSIS — R20.2 PARESTHESIA: ICD-10-CM

## 2024-06-25 DIAGNOSIS — M54.41 ACUTE BILATERAL LOW BACK PAIN WITH RIGHT-SIDED SCIATICA: Primary | ICD-10-CM

## 2024-06-25 PROCEDURE — 97140 MANUAL THERAPY 1/> REGIONS: CPT | Performed by: PHYSICAL THERAPIST

## 2024-06-25 PROCEDURE — 97110 THERAPEUTIC EXERCISES: CPT | Performed by: PHYSICAL THERAPIST

## 2024-06-25 PROCEDURE — 97112 NEUROMUSCULAR REEDUCATION: CPT | Performed by: PHYSICAL THERAPIST

## 2024-06-25 NOTE — TELEPHONE ENCOUNTER
Caller: Patient     Doctor:  //      Reason for call: Patient would like to DAVID from  to . Would like second opinion and has family who see's      Call back#: [phone]

## 2024-06-25 NOTE — TELEPHONE ENCOUNTER
Pt was confused she states she was told dr lacy does not do low back. Pt would like to stick with dr lacy.

## 2024-06-25 NOTE — PROGRESS NOTES
"Daily Note     Today's date: 2024  Patient name: Lisa M Gitlin  : 1970  MRN: [id number]  Referring provider: Mike Salgado DO  Dx:   Encounter Diagnosis     ICD-10-CM    1. Acute bilateral low back pain with right-sided sciatica  M54.41       2. Paresthesia  R20.2                      Subjective: Pt reports tingling in the extremities are worsening. Also reports aching throughout the lumbar       Objective: See treatment diary below      Assessment: Tolerated treatment fair. Mild reduction in paresthesia's with prone on elbow and and REIL. Resumed mechanical traction but terminated after 5 minutes secondary to increased in paresthesia's in toes. Recommend pt continue with core stabilization and ROM exercises. Implemented hamstring stretching vs nerve glides. No increase of symptoms with this exercise. Patient would benefit from continued PT. She attempted to get into pain management, but unable to schedule for 2 months.       Plan: Continue per plan of care.      Precautions: A-Fib, SVT, Anxiety, Migraines, Myofasical pain    Manuals  6/10 6/13 6/17 6/20 6/25       CPA of L/S   Gr II (PT,  AL) Gr II Gr II with SI joint rocking Gr II with SI joint rocking       STM to lumbar paraspinals  LM LM JK  Using LLL x4'  Using LLL x4'                                  Neuro Re-Ed              nerve glides  W/ towel 2x10 Point toe 2x10 10x on ea 2 sets 10x on ea 2 sets Hamstring stretch 10x10\"       Slump nerve glides             Bridge  2x10 2x10 2x10  2x10  3x10        TrA SLR  2x10 2x10          TrA sidelying hip abduction             Prone hip extension  nv X10 ea          Bird dog              Ther Ex             Elliptical/woodway/bike  Bike 5' Bike 5'  7' lvl 2 7' lvl 2       SB stretch  20\"x4 R 20\"x4  ea Long sitting with strap 5x10\"  B         LTR  10x5\"ea 10x5\" ea 10x5\"  10x5\"  2x10 5\"       Prone press up   x10 x10 10x3\" 2 sets 10x 3\"  2x 10 3\"       Quad stretch     5x10\" B         KRISTY   3 min 2 " min 2 min   2 minutes                                  Ther Activity                                       Gait Training                                         Modalities             Intermittent traction     45-50# 10' 45-50# 5'                    1:1 with PT from 415-5pm

## 2024-06-27 ENCOUNTER — OFFICE VISIT (OUTPATIENT)
Dept: PHYSICAL THERAPY | Facility: CLINIC | Age: 54
End: 2024-06-27
Payer: COMMERCIAL

## 2024-06-27 DIAGNOSIS — R20.2 PARESTHESIA: ICD-10-CM

## 2024-06-27 DIAGNOSIS — M54.41 ACUTE BILATERAL LOW BACK PAIN WITH RIGHT-SIDED SCIATICA: Primary | ICD-10-CM

## 2024-06-27 PROCEDURE — 97140 MANUAL THERAPY 1/> REGIONS: CPT | Performed by: PHYSICAL THERAPIST

## 2024-06-27 PROCEDURE — 97012 MECHANICAL TRACTION THERAPY: CPT | Performed by: PHYSICAL THERAPIST

## 2024-06-27 PROCEDURE — 97110 THERAPEUTIC EXERCISES: CPT | Performed by: PHYSICAL THERAPIST

## 2024-06-27 NOTE — PROGRESS NOTES
"Daily Note     Today's date: 2024  Patient name: Lisa M Gitlin  : 1970  MRN: [id number]  Referring provider: Mike Salgado DO  Dx:   Encounter Diagnosis     ICD-10-CM    1. Acute bilateral low back pain with right-sided sciatica  M54.41       2. Paresthesia  R20.2           Start Time: 1615  Stop Time: 1710  Total time in clinic (min): 55 minutes    Subjective: Pt reports no significant changes but would like to continue with lumbar traction.       Objective: See treatment diary below      Assessment: Tolerated treatment well. Initiate bird dog for core stabilization. Pt was tender in thoracolumbar spine during CPA mobilizations. Continued with lumbar traction without worsening of parethesia. Increased 5# to 55#. Encouraged compliance to HEP including core stability. Patient exhibited good technique with therapeutic exercises and would benefit from continued PT.      Plan: Continue per plan of care.      Precautions: A-Fib, SVT, Anxiety, Migraines, Myofasical pain    Manuals  6/10 6/13 6/17 6/20 6/25 6/27      CPA of L/S   Gr II (PT,  AL) Gr II Gr II with SI joint rocking Gr II with SI joint rocking Gr II with SI joint rocking      STM to lumbar paraspinals  LM LM JK  Using LLL x4'  Using LLL x4'  Using LLL x5'                                 Neuro Re-Ed              nerve glides  W/ towel 2x10 Point toe 2x10 10x on ea 2 sets 10x on ea 2 sets Hamstring stretch 10x10\" Hamstring stretch 10x10\"      Slump nerve glides             Bridge  2x10 2x10 2x10  2x10  3x10  3x10       TrA SLR  2x10 2x10          TrA sidelying hip abduction             Prone hip extension  nv X10 ea          Bird dog        10x      Ther Ex             Elliptical/woodway/bike  Bike 5' Bike 5'  7' lvl 2 7' lvl 2 7' lvl 2      SB stretch  20\"x4 R 20\"x4  ea Long sitting with strap 5x10\"  B         LTR  10x5\"ea 10x5\" ea 10x5\"  10x5\"  2x10 5\" 2x10 5\"      Prone press up   x10 x10 10x3\" 2 sets 10x 3\"  2x 10 3\" 2x 10 3\"      Quad " "stretch     5x10\" B         KRISTY   3 min 2 min 2 min   2 minutes                                  Ther Activity                                       Gait Training                                         Modalities             Intermittent traction     45-50# 10' 45-50# 5' 45-50# 5'                   1:1 with PT from [phone]             "

## 2024-07-02 ENCOUNTER — OFFICE VISIT (OUTPATIENT)
Dept: PHYSICAL THERAPY | Facility: CLINIC | Age: 54
End: 2024-07-02
Payer: COMMERCIAL

## 2024-07-02 DIAGNOSIS — M54.41 ACUTE BILATERAL LOW BACK PAIN WITH RIGHT-SIDED SCIATICA: Primary | ICD-10-CM

## 2024-07-02 DIAGNOSIS — R20.2 PARESTHESIA: ICD-10-CM

## 2024-07-02 PROCEDURE — 97110 THERAPEUTIC EXERCISES: CPT | Performed by: PHYSICAL THERAPIST

## 2024-07-02 PROCEDURE — 97012 MECHANICAL TRACTION THERAPY: CPT | Performed by: PHYSICAL THERAPIST

## 2024-07-02 PROCEDURE — 97112 NEUROMUSCULAR REEDUCATION: CPT | Performed by: PHYSICAL THERAPIST

## 2024-07-02 PROCEDURE — 97140 MANUAL THERAPY 1/> REGIONS: CPT | Performed by: PHYSICAL THERAPIST

## 2024-07-02 NOTE — PROGRESS NOTES
"Daily Note     Today's date: 2024  Patient name: Lisa M Gitlin  : 1970  MRN: [id number]  Referring provider: Mike Salgado DO  Dx:   Encounter Diagnosis     ICD-10-CM    1. Acute bilateral low back pain with right-sided sciatica  M54.41       2. Paresthesia  R20.2           Start Time: 0900  Stop Time: 1000  Total time in clinic (min): 60 minutes    Subjective: Pt reports having deep low back discomfort around L3/5 centrally. Admits to having to drive a significant amount yesterday for work. Also report more paresthesias in RLE vs LLE. Pt reports mild relief with lumbar traction after last visit.       Objective: See treatment diary below      Assessment: Tolerated treatment fair. Bird dog demonstrates more control and stability than last week. Continued with intermittent mechanical traction due to mild relief after last session. Moderate tenderness present in central lumbar spine from L1-L5, especially during CPA. Progressed core stabilization program in today's session. Used modification where patient was challenged.  Patient exhibited good technique with therapeutic exercises and would benefit from continued PT.       Plan: Continue per plan of care.      Precautions: A-Fib, SVT, Anxiety, Migraines, Myofasical pain    Manuals  6/10 6/13 6/17 6/20 6/25 6/27 7/2     CPA of L/S   Gr II (PT,  AL) Gr II Gr II with SI joint rocking Gr II with SI joint rocking Gr II with SI joint rocking Gr II with SI joint rocking     STM to lumbar paraspinals  LM LM JK  Using LLL x4'  Using LLL x4'  Using LLL x5'  Using LLL x5'                                Neuro Re-Ed             90/ nerve glides  W/ towel 2x10 Point toe 2x10 10x on ea 2 sets 10x on ea 2 sets Hamstring stretch 10x10\" Hamstring stretch 10x10\"      Slump nerve glides             Bridge  2x10 2x10 2x10  2x10  3x10  3x10  3x10      TrA SLR  2x10 2x10          TrA sidelying hip abduction             Prone hip extension  nv X10 ea          Bird dog        " "10x 10x     In/out with TrA        Hooklying 10x     Ther Ex             Elliptical/woodway/bike  Bike 5' Bike 5'  7' lvl 2 7' lvl 2 7' lvl 2 7' lvl 2     SB stretch  20\"x4 R 20\"x4  ea Long sitting with strap 5x10\"  B         LTR  10x5\"ea 10x5\" ea 10x5\"  10x5\"  2x10 5\" 2x10 5\" 2x10 5\"     Prone press up   x10 x10 10x3\" 2 sets 10x 3\"  2x 10 3\" 2x 10 3\" 2x 10 3\"     Quad stretch     5x10\" B         KRISTY   3 min 2 min 2 min   2 minutes                                  Ther Activity             Prone FA planks        5x10\" modifed on knees                  Gait Training                                         Modalities             Intermittent traction     45-50# 10' 45-50# 5' 45-50# 10' 50-55# 10'                  1:1 with PT from 3-351h               "

## 2024-07-05 ENCOUNTER — OFFICE VISIT (OUTPATIENT)
Dept: PHYSICAL THERAPY | Facility: CLINIC | Age: 54
End: 2024-07-05
Payer: COMMERCIAL

## 2024-07-05 DIAGNOSIS — R20.2 PARESTHESIA: ICD-10-CM

## 2024-07-05 DIAGNOSIS — M25.531 RIGHT WRIST PAIN: ICD-10-CM

## 2024-07-05 DIAGNOSIS — M54.41 ACUTE BILATERAL LOW BACK PAIN WITH RIGHT-SIDED SCIATICA: Primary | ICD-10-CM

## 2024-07-05 PROCEDURE — 97140 MANUAL THERAPY 1/> REGIONS: CPT | Performed by: PHYSICAL THERAPIST

## 2024-07-05 PROCEDURE — 97110 THERAPEUTIC EXERCISES: CPT | Performed by: PHYSICAL THERAPIST

## 2024-07-05 PROCEDURE — 97012 MECHANICAL TRACTION THERAPY: CPT | Performed by: PHYSICAL THERAPIST

## 2024-07-05 NOTE — PROGRESS NOTES
"Daily Note     Today's date: 2024  Patient name: Lisa M Gitlin  : 1970  MRN: [id number]  Referring provider: Mike Salgado DO  Dx:   Encounter Diagnosis     ICD-10-CM    1. Acute bilateral low back pain with right-sided sciatica  M54.41       2. Paresthesia  R20.2                      Subjective: Pt saw PCP this week. She states that she will be undergoing an MRI of the lumbar spine. She notes tingling resolved quickly after last session. Mild increase of tingling present after traction last session.       Objective: See treatment diary below      Assessment: Pt was fairly tenderness to bilateral SIJ region, gluteal insertion, and lower lumbar spine. Tolerated treatment fair. Continued with core stabilization program without aggravating symptoms. Continued with lumbar traction with same parameters as last session.  Patient exhibited good technique with therapeutic exercises and would benefit from continued PT.       Plan: Continue per plan of care.      Precautions: A-Fib, SVT, Anxiety, Migraines, Myofasical pain    Manuals  6/10 6/13 6/17 6/20 6/25 6/27 7/2 7/5    CPA of L/S   Gr II (PT,  AL) Gr II Gr II with SI joint rocking Gr II with SI joint rocking Gr II with SI joint rocking Gr II with SI joint rocking Gr II with SI joint rocking    STM to lumbar paraspinals  LM LM JK  Using LLL x4'  Using LLL x4'  Using LLL x5'  Using LLL x5'  Using LLL x5'                               Neuro Re-Ed             90/ nerve glides  W/ towel 2x10 Point toe 2x10 10x on ea 2 sets 10x on ea 2 sets Hamstring stretch 10x10\" Hamstring stretch 10x10\"      Slump nerve glides             Bridge  2x10 2x10 2x10  2x10  3x10  3x10  3x10  3x10     TrA SLR  2x10 2x10          TrA sidelying hip abduction             Prone hip extension  nv X10 ea          Bird dog        10x 10x 2x10     In/out with TrA        Hooklying 10x Hooklying 2x10    Ther Ex             Elliptical/woodway/bike  Bike 5' Bike 5'  7' lvl 2 7' lvl 2 7' lvl " "2 7' lvl 2 8' lvl 2     SB stretch  20\"x4 R 20\"x4  ea Long sitting with strap 5x10\"  B         LTR  10x5\"ea 10x5\" ea 10x5\"  10x5\"  2x10 5\" 2x10 5\" 2x10 5\" 2x 10 3\"    Prone press up   x10 x10 10x3\" 2 sets 10x 3\"  2x 10 3\" 2x 10 3\" 2x 10 3\" 2x 10 3\"    Quad stretch     5x10\" B         KRISTY   3 min 2 min 2 min   2 minutes                                  Ther Activity             Prone FA planks        5x10\" modifed on knees 5x10\" modifed on knees                 Gait Training                                         Modalities             Intermittent traction     45-50# 10' 45-50# 5' 45-50# 10' 50-55# 10' 50-55# 10'                 1:1 with PT from [id number]                 "

## 2024-07-09 ENCOUNTER — OFFICE VISIT (OUTPATIENT)
Dept: PHYSICAL THERAPY | Facility: CLINIC | Age: 54
End: 2024-07-09
Payer: COMMERCIAL

## 2024-07-09 DIAGNOSIS — M54.41 ACUTE BILATERAL LOW BACK PAIN WITH RIGHT-SIDED SCIATICA: Primary | ICD-10-CM

## 2024-07-09 DIAGNOSIS — R20.2 PARESTHESIA: ICD-10-CM

## 2024-07-09 PROCEDURE — 97112 NEUROMUSCULAR REEDUCATION: CPT | Performed by: PHYSICAL THERAPIST

## 2024-07-09 PROCEDURE — 97110 THERAPEUTIC EXERCISES: CPT | Performed by: PHYSICAL THERAPIST

## 2024-07-09 PROCEDURE — 97012 MECHANICAL TRACTION THERAPY: CPT | Performed by: PHYSICAL THERAPIST

## 2024-07-09 PROCEDURE — 97140 MANUAL THERAPY 1/> REGIONS: CPT | Performed by: PHYSICAL THERAPIST

## 2024-07-09 NOTE — PROGRESS NOTES
"Daily Note     Today's date: 2024  Patient name: Lisa M Gitlin  : 1970  MRN: [id number]  Referring provider: Mike Salgado DO  Dx:   Encounter Diagnosis     ICD-10-CM    1. Acute bilateral low back pain with right-sided sciatica  M54.41       2. Paresthesia  R20.2                      Subjective: Pt offers no new complaints today. Continued discomfort in lumbar spine with intermittent radicular symptoms present.       Objective: See treatment diary below      Assessment: Tolerated treatment well. Progressed core stabilization program. Pt challenged with new exercises without radicular symptoms present. Patient exhibited good technique with therapeutic exercises and would benefit from continued PT.Continued with lumbar traction to help reduce or modulate paresthesias.      Plan: Continue per plan of care.      Precautions: A-Fib, SVT, Anxiety, Migraines, Myofasical pain    Manuals  6/10 6/13 6/17 6/20 6/25 6/27 7/2 7/5 7/9   CPA of L/S   Gr II (PT,  AL) Gr II Gr II with SI joint rocking Gr II with SI joint rocking Gr II with SI joint rocking Gr II with SI joint rocking Gr II with SI joint rocking Gr II with SI joint rocking   STM to lumbar paraspinals  LM LM JK  Using LLL x4'  Using LLL x4'  Using LLL x5'  Using LLL x5'  Using LLL x5'  Using LLL x5'                              Neuro Re-Ed              nerve glides  W/ towel 2x10 Point toe 2x10 10x on ea 2 sets 10x on ea 2 sets Hamstring stretch 10x10\" Hamstring stretch 10x10\"      Slump nerve glides             Bridge  2x10 2x10 2x10  2x10  3x10  3x10  3x10  3x10     TrA SLR  2x10 2x10          TrA sidelying hip abduction             Prone hip extension  nv X10 ea          Bird dog        10x 10x 2x10  2x10   Dying bug TrA          2x10 with PB   In/out with TrA        Hooklying 10x Hooklying 2x10    Ther Ex             Elliptical/woodway/bike  Bike 5' Bike 5'  7' lvl 2 7' lvl 2 7' lvl 2 7' lvl 2 8' lvl 2  8' lvl 2    SB stretch  20\"x4 R 20\"x4  " "ea Long sitting with strap 5x10\"  B         LTR  10x5\"ea 10x5\" ea 10x5\"  10x5\"  2x10 5\" 2x10 5\" 2x10 5\" 2x 10 3\"    Prone press up   x10 x10 10x3\" 2 sets 10x 3\"  2x 10 3\" 2x 10 3\" 2x 10 3\" 2x 10 3\" 2x 10 3\"   Quad stretch     5x10\" B         KRISTY   3 min 2 min 2 min   2 minutes                                  Ther Activity             Prone FA planks        5x10\" modifed on knees 5x10\" modifed on knees 5x10\" modifed on knees   Hip hinge with dowel          2x10 with cueing   Gait Training                                         Modalities             Intermittent traction     45-50# 10' 45-50# 5' 45-50# 10' 50-55# 10' 50-55# 10' 50-55# 10'                1:1 with PT from 507-545pm                   "

## 2024-07-11 ENCOUNTER — OFFICE VISIT (OUTPATIENT)
Dept: PHYSICAL THERAPY | Facility: CLINIC | Age: 54
End: 2024-07-11
Payer: COMMERCIAL

## 2024-07-11 DIAGNOSIS — M54.41 ACUTE BILATERAL LOW BACK PAIN WITH RIGHT-SIDED SCIATICA: Primary | ICD-10-CM

## 2024-07-11 DIAGNOSIS — R20.2 PARESTHESIA: ICD-10-CM

## 2024-07-11 PROCEDURE — 97140 MANUAL THERAPY 1/> REGIONS: CPT | Performed by: PHYSICAL THERAPIST

## 2024-07-11 PROCEDURE — 97112 NEUROMUSCULAR REEDUCATION: CPT | Performed by: PHYSICAL THERAPIST

## 2024-07-11 PROCEDURE — 97110 THERAPEUTIC EXERCISES: CPT | Performed by: PHYSICAL THERAPIST

## 2024-07-11 PROCEDURE — 97012 MECHANICAL TRACTION THERAPY: CPT | Performed by: PHYSICAL THERAPIST

## 2024-07-11 NOTE — PROGRESS NOTES
"Daily Note     Today's date: 2024  Patient name: Lisa M Gitlin  : 1970  MRN: [id number]  Referring provider: Mike Salgado DO  Dx:   Encounter Diagnosis     ICD-10-CM    1. Acute bilateral low back pain with right-sided sciatica  M54.41       2. Paresthesia  R20.2                      Subjective: Pt reports having more numbness in her right posterior thigh since yesterday. 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       Objective: See treatment diary below      Assessment: Tolerated treatment well. Pt point tender to R SIJ region. She will receive an MRI of lumbar spine next week. Continued with lumbopelvic stabilization. Pt challenged with core stability program. Pt able to perform regular planks vs modified with good form. Moderate to max fatigue present in core muscles after program.  Patient would benefit from continued PT.       Plan: Continue per plan of care.      Precautions: A-Fib, SVT, Anxiety, Migraines, Myofasical pain    Manuals    CPA of L/S Gr III with SI joint rocking    Gr II with SI joint rocking Gr II with SI joint rocking Gr II with SI joint rocking Gr II with SI joint rocking Gr II with SI joint rocking Gr II with SI joint rocking   STM to lumbar paraspinals Using LLL x5'     Using LLL x4'  Using LLL x4'  Using LLL x5'  Using LLL x5'  Using LLL x5'  Using LLL x5'                              Neuro Re-Ed              nerve glides     10x on ea 2 sets Hamstring stretch 10x10\" Hamstring stretch 10x10\"      Slump nerve glides             Bridge 3x10    2x10  3x10  3x10  3x10  3x10     TrA SLR             TrA sidelying hip abduction             Prone hip extension             Bird dog  2x10       10x 10x 2x10  2x10   Dying bug TrA 1x10 PB         2x10 with PB   In/out with TrA 1x10        Hooklying 10x Hooklying 2x10    Ther Ex             Elliptical/woodway/bike 8' lvl 2     " "7' lvl 2 7' lvl 2 7' lvl 2 7' lvl 2 8' lvl 2  8' lvl 2    SB stretch             LTR     10x5\"  2x10 5\" 2x10 5\" 2x10 5\" 2x 10 3\"    Prone press up  2x 10 3\"    10x 3\"  2x 10 3\" 2x 10 3\" 2x 10 3\" 2x 10 3\" 2x 10 3\"   Quad stretch              KRISTY       2 minutes                                  Ther Activity             Prone FA planks 5 of regular 2 of modified 10\" hold       5x10\" modifed on knees 5x10\" modifed on knees 5x10\" modifed on knees   Hip hinge with dowel          2x10 with cueing   Gait Training                                       Modalities             Intermittent traction 50-55# 10'    45-50# 10' 45-50# 5' 45-50# 10' 50-55# 10' 50-55# 10' 50-55# 10'                1:1 with PT from 505-545pm                     "

## 2024-07-16 ENCOUNTER — EVALUATION (OUTPATIENT)
Dept: PHYSICAL THERAPY | Facility: CLINIC | Age: 54
End: 2024-07-16
Payer: COMMERCIAL

## 2024-07-16 DIAGNOSIS — M54.41 ACUTE BILATERAL LOW BACK PAIN WITH RIGHT-SIDED SCIATICA: Primary | ICD-10-CM

## 2024-07-16 DIAGNOSIS — R20.2 PARESTHESIA: ICD-10-CM

## 2024-07-16 PROCEDURE — 97140 MANUAL THERAPY 1/> REGIONS: CPT | Performed by: PHYSICAL THERAPIST

## 2024-07-16 PROCEDURE — 97110 THERAPEUTIC EXERCISES: CPT | Performed by: PHYSICAL THERAPIST

## 2024-07-16 PROCEDURE — 97112 NEUROMUSCULAR REEDUCATION: CPT | Performed by: PHYSICAL THERAPIST
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
## 2024-07-18 ENCOUNTER — OFFICE VISIT (OUTPATIENT)
Dept: PHYSICAL THERAPY | Facility: CLINIC | Age: 54
End: 2024-07-18
Payer: COMMERCIAL

## 2024-07-18 DIAGNOSIS — R20.2 PARESTHESIA: ICD-10-CM

## 2024-07-18 DIAGNOSIS — M54.41 ACUTE BILATERAL LOW BACK PAIN WITH RIGHT-SIDED SCIATICA: Primary | ICD-10-CM

## 2024-07-18 PROCEDURE — 97110 THERAPEUTIC EXERCISES: CPT | Performed by: PHYSICAL THERAPIST

## 2024-07-18 PROCEDURE — 97140 MANUAL THERAPY 1/> REGIONS: CPT | Performed by: PHYSICAL THERAPIST

## 2024-07-18 PROCEDURE — 97112 NEUROMUSCULAR REEDUCATION: CPT | Performed by: PHYSICAL THERAPIST

## 2024-07-18 NOTE — PROGRESS NOTES
Daily Note     Today's date: 2024  Patient name: Lisa M Gitlin  : 1970  MRN: [id number]  Referring provider: Mike Salgado DO  Dx:   Encounter Diagnosis     ICD-10-CM    1. Acute bilateral low back pain with right-sided sciatica  M54.41       2. Paresthesia  R20.2           Start Time: 1700  Stop Time: 1738  Total time in clinic (min): 38 minutes    Subjective: Pt had MRI yesterday with results below. Pt frustrated with her current pain state and tingling in her feet. Pt admits to having a migraine today so may be limited with what she can do. Pt did take her migraine medication.       Objective: See treatment diary below  FINDINGS:     Marrow and alignment:     The lumbar vertebrae are numbered from caudal to cranial direction, starting   from the most inferior lumbar type vertebra numbered as L5 to L1.     Spinal curvature, left apex at L2-3. No acute fracture or dislocation. Chronic   L5 left pars defect.     Multilevel degenerative changes including disc desiccation, osteophytes, and   facet arthropathy. Disc bulges at L3-4, L4-5, L5-S1. Moderate to severe loss of   disc heights and degenerative endplate changes at T9-10, T10-11.     T9-10: Mild to moderate left foraminal stenosis.   T10-11: Disc bulge. Right annular fissure. Mild spinal canal stenosis. Mild left   foraminal stenosis.   T11-12: No spinal canal or foraminal stenosis.   T12-L1: No spinal canal or foraminal stenosis.   L1-2: No spinal canal or foraminal stenosis.   L2-3: No spinal canal or foraminal stenosis.   L3-4: No spinal canal stenosis. Mild left foraminal stenosis.   L4-5: No spinal canal stenosis. Mild bilateral foraminal stenosis.   L5-S1: No spinal canal stenosis. Mild bilateral foraminal stenosis.     CSF/spinal cord: The conus medullaris terminates at L1-2 level. No definite   abnormal signal intensity in the lower cord/conus.     Paraspinal soft tissues: Normal.     Assessment: Modified treatment secondary to migraine.  "Continued with IASTM and manual intervention direct at the lumbar spine. Tolerated treatment fair. Focused on LE strength and gluteal strength. Patient exhibited good technique with therapeutic exercises and would benefit from continued PT. Will continue with program as tolerated when migraine subsides. Pt notes low effectiveness of lumbar mechanical traction, therefore, omitted it from treatment.       Plan: Continue per plan of care.      Precautions: A-Fib, SVT, Anxiety, Migraines, Myofasical pain    Manuals 7/11 7/16 7/18 6/20 6/25 6/27 7/2 7/5 7/9   CPA of L/S Gr III with SI joint rocking  Gr III  Gr II with SI joint rocking Gr II with SI joint rocking Gr II with SI joint rocking Gr II with SI joint rocking Gr II with SI joint rocking Gr II with SI joint rocking   STM to lumbar paraspinals Using LLL x5'   Using LLL x5'   Using LLL x4'  Using LLL x4'  Using LLL x5'  Using LLL x5'  Using LLL x5'  Using LLL x5'                              Neuro Re-Ed             90/90 nerve glides     10x on ea 2 sets Hamstring stretch 10x10\" Hamstring stretch 10x10\"      Slump nerve glides             Bridge 3x10    2x10  3x10  3x10  3x10  3x10     TrA SLR             TrA sidelying hip abduction  2x10 B 1x10 B           Prone hip extension             Bird dog  2x10       10x 10x 2x10  2x10   Dying bug TrA 1x10 PB         2x10 with PB   SL clam shells  3x10 4# B 3x10 4# B          In/out with TrA 1x10        Hooklying 10x Hooklying 2x10    Ther Ex             Elliptical/woodway/bike 8' lvl 2  8' lvl 2    7' lvl 2 7' lvl 2 7' lvl 2 7' lvl 2 8' lvl 2  8' lvl 2    SB stretch             LTR     10x5\"  2x10 5\" 2x10 5\" 2x10 5\" 2x 10 3\"    Prone press up  2x 10 3\"  2x10 3\"   10x 3\"  2x 10 3\" 2x 10 3\" 2x 10 3\" 2x 10 3\" 2x 10 3\"   Quad stretch              KRISTY       2 minutes        Hamstring stretch   10x10\"  B          Piriformis stretch   10x10\" B          Ther Activity             Prone FA planks 5 of regular 2 of modified 10\" hold " "      5x10\" modifed on knees 5x10\" modifed on knees 5x10\" modifed on knees   Hip hinge with dowel          2x10 with cueing   Gait Training                                       Modalities             Intermittent traction 50-55# 10'  DC  45-50# 10' 45-50# 5' 45-50# 10' 50-55# 10' 50-55# 10' 50-55# 10'                1:1 with PT from 1-466                         "

## 2024-07-23 ENCOUNTER — APPOINTMENT (OUTPATIENT)
Dept: PHYSICAL THERAPY | Facility: CLINIC | Age: 54
End: 2024-07-23
Payer: COMMERCIAL

## 2024-07-25 ENCOUNTER — OFFICE VISIT (OUTPATIENT)
Dept: PHYSICAL THERAPY | Facility: CLINIC | Age: 54
End: 2024-07-25
Payer: COMMERCIAL

## 2024-07-25 DIAGNOSIS — M54.41 ACUTE BILATERAL LOW BACK PAIN WITH RIGHT-SIDED SCIATICA: Primary | ICD-10-CM

## 2024-07-25 DIAGNOSIS — R20.2 PARESTHESIA: ICD-10-CM

## 2024-07-25 PROCEDURE — 97112 NEUROMUSCULAR REEDUCATION: CPT | Performed by: PHYSICAL THERAPIST

## 2024-07-25 PROCEDURE — 97140 MANUAL THERAPY 1/> REGIONS: CPT | Performed by: PHYSICAL THERAPIST

## 2024-07-25 PROCEDURE — 97110 THERAPEUTIC EXERCISES: CPT | Performed by: PHYSICAL THERAPIST

## 2024-07-25 NOTE — PROGRESS NOTES
Daily Note     Today's date: 2024  Patient name: Lisa M Gitlin  : 1970  MRN: [id number]  Referring provider: Mike Salgado DO  Dx:   Encounter Diagnosis     ICD-10-CM    1. Acute bilateral low back pain with right-sided sciatica  M54.41       2. Paresthesia  R20.2                      Subjective: Pt consults with pain management next week on Tuesday. Her foot paresthias seem to be worsening. Often can also feel tingling in the back of the LE from the popliteal fold to the foot. Symptoms somewhat inconsistent in the lower extremity, terms of severity of left vs right.       Objective: See treatment diary below      Assessment: Tolerated treatment well. Patient was fairly tender to palpation to gluteus medius insertion. Focused on this region with manual intervention. Pt remains very compliance with HEP. Challenged with core strength; however motor control improving. Patient would benefit from continued PT      Plan: Continue per plan of care.      Precautions: A-Fib, SVT, Anxiety, Migraines, Myofasical pain    Manuals    CPA of L/S Gr III with SI joint rocking  Gr III Gr III c/ STM to gluteal insertion    Gr II with SI joint rocking Gr II with SI joint rocking Gr II with SI joint rocking   STM to lumbar paraspinals Using LLL x5'   Using LLL x5'  Using LLL x5' + glute insertion    Using LLL x5'  Using LLL x5'  Using LLL x5'                              Neuro Re-Ed              nerve glides             Slump nerve glides             Bridge 3x10       3x10  3x10     TrA SLR             TrA sidelying hip abduction  2x10 B 1x10 B  2x10 B          Prone hip extension             Bird dog  2x10        10x 2x10  2x10   Dying bug TrA 1x10 PB   1x10 PB      2x10 with PB   SL clam shells  3x10 4# B 3x10 4# B 3x10 rtb with TrA         In/out with TrA 1x10    2x10    Hooklying 10x Hooklying 2x10    Ther Ex             Elliptical/woodway/bike 8' lvl 2  8' lvl 2   8' lvl 2      "7' lvl 2 8' lvl 2  8' lvl 2    SB stretch             LTR        2x10 5\" 2x 10 3\"    Prone press up  2x 10 3\"  2x10 3\"  2x10 3\"    2x 10 3\" 2x 10 3\" 2x 10 3\"   Quad stretch              KRISTY              Hamstring stretch   10x10\"  B 10x10\"  B         Piriformis stretch   10x10\" B          Ther Activity             Prone FA planks 5 of regular 2 of modified 10\" hold       5x10\" modifed on knees 5x10\" modifed on knees 5x10\" modifed on knees   Hip hinge with dowel          2x10 with cueing   Gait Training                                       Modalities             Intermittent traction 50-55# 10'  DC     50-55# 10' 50-55# 10' 50-55# 10'                1:1 with PT from 415-5pm                           "

## 2024-07-30 ENCOUNTER — APPOINTMENT (OUTPATIENT)
Dept: PHYSICAL THERAPY | Facility: CLINIC | Age: 54
End: 2024-07-30
Payer: COMMERCIAL
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
## 2024-08-01 ENCOUNTER — OFFICE VISIT (OUTPATIENT)
Dept: PAIN MEDICINE | Facility: CLINIC | Age: 54
End: 2024-08-01
Payer: COMMERCIAL

## 2024-08-01 VITALS
HEART RATE: 79 BPM | BODY MASS INDEX: 23.14 KG/M2 | HEIGHT: 66 IN | WEIGHT: 144 LBS | DIASTOLIC BLOOD PRESSURE: 75 MMHG | SYSTOLIC BLOOD PRESSURE: 116 MMHG

## 2024-08-01 DIAGNOSIS — M54.16 LUMBAR RADICULOPATHY: Primary | ICD-10-CM

## 2024-08-01 PROCEDURE — 99213 OFFICE O/P EST LOW 20 MIN: CPT | Performed by: NURSE PRACTITIONER

## 2024-08-22 ENCOUNTER — TELEPHONE (OUTPATIENT)
Age: 54
End: 2024-08-22

## 2024-08-23 ENCOUNTER — APPOINTMENT (OUTPATIENT)
Dept: RADIOLOGY | Age: 54
End: 2024-08-23
Payer: COMMERCIAL

## 2024-08-23 ENCOUNTER — OFFICE VISIT (OUTPATIENT)
Age: 54
End: 2024-08-23
Payer: COMMERCIAL

## 2024-08-23 VITALS
DIASTOLIC BLOOD PRESSURE: 80 MMHG | HEIGHT: 66 IN | SYSTOLIC BLOOD PRESSURE: 112 MMHG | BODY MASS INDEX: 22.02 KG/M2 | HEART RATE: 68 BPM | WEIGHT: 137 LBS | OXYGEN SATURATION: 99 %

## 2024-08-23 DIAGNOSIS — M51.37 DDD (DEGENERATIVE DISC DISEASE), LUMBOSACRAL: ICD-10-CM

## 2024-08-23 DIAGNOSIS — I80.9 THROMBOPHLEBITIS: ICD-10-CM

## 2024-08-23 DIAGNOSIS — G43.109 MIGRAINE WITH AURA AND WITHOUT STATUS MIGRAINOSUS, NOT INTRACTABLE: Primary | ICD-10-CM

## 2024-08-23 PROCEDURE — 99215 OFFICE O/P EST HI 40 MIN: CPT | Performed by: PSYCHIATRY & NEUROLOGY

## 2024-08-23 PROCEDURE — 73502 X-RAY EXAM HIP UNI 2-3 VIEWS: CPT

## 2024-08-23 RX ORDER — ONDANSETRON 8 MG/1
TABLET, FILM COATED ORAL
COMMUNITY
Start: 2024-08-20

## 2024-08-23 RX ORDER — POLYETHYLENE GLYCOL 3350 17 G/17G
POWDER, FOR SOLUTION ORAL
COMMUNITY
Start: 2024-08-20

## 2024-08-23 RX ORDER — METHOCARBAMOL 500 MG/1
TABLET, FILM COATED ORAL
Qty: 60 TABLET | Refills: 1 | Status: SHIPPED | OUTPATIENT
Start: 2024-08-23

## 2024-08-23 RX ORDER — POLYETHYLENE GLYCOL 1450
POWDER (GRAM) MISCELLANEOUS
COMMUNITY
Start: 2024-08-20

## 2024-08-23 NOTE — Clinical Note
Khanh Ramirez do you think we can get the MRI of the lumbar spine from [facility]?  Thank you,   Dr. Sammy MD.  08/23/24  10:54 AM
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
## 2024-09-03 ENCOUNTER — TELEPHONE (OUTPATIENT)
Dept: HEMATOLOGY ONCOLOGY | Facility: CLINIC | Age: 54
End: 2024-09-03

## 2024-09-03 ENCOUNTER — HOSPITAL ENCOUNTER (OUTPATIENT)
Dept: NEUROLOGY | Facility: CLINIC | Age: 54
Discharge: HOME | End: 2024-09-03
Payer: COMMERCIAL

## 2024-09-03 DIAGNOSIS — G62.9 PERIPHERAL NEUROPATHY: ICD-10-CM

## 2024-09-03 PROBLEM — R20.0 NUMBNESS: Status: ACTIVE | Noted: 2024-09-03

## 2024-09-03 PROCEDURE — 95911 NRV CNDJ TEST 9-10 STUDIES: CPT | Performed by: PSYCHIATRY & NEUROLOGY

## 2024-09-03 PROCEDURE — 95886 MUSC TEST DONE W/N TEST COMP: CPT | Performed by: PSYCHIATRY & NEUROLOGY

## 2024-09-03 NOTE — TELEPHONE ENCOUNTER
Left message for patient regarding upcoming appointment with Hoang Donis on 9/9.  Advised patient to return call to notify office why she is following up since last appointment was 2022 so provider is aware of any symptoms so they can order labwork or testing to be completed prior to appointment.  [employer] number provided.

## 2024-09-04 ENCOUNTER — HOSPITAL ENCOUNTER (OUTPATIENT)
Dept: NON INVASIVE DIAGNOSTICS | Facility: HOSPITAL | Age: 54
Discharge: HOME | End: 2024-09-04
Attending: PSYCHIATRY & NEUROLOGY
Payer: COMMERCIAL

## 2024-09-04 DIAGNOSIS — I80.9 THROMBOPHLEBITIS: ICD-10-CM

## 2024-09-04 PROCEDURE — 93971 EXTREMITY STUDY: CPT

## 2024-09-04 PROCEDURE — 93971 EXTREMITY STUDY: CPT | Performed by: SURGERY

## 2024-09-09 ENCOUNTER — OFFICE VISIT (OUTPATIENT)
Dept: HEMATOLOGY ONCOLOGY | Facility: CLINIC | Age: 54
End: 2024-09-09
Payer: COMMERCIAL

## 2024-09-09 VITALS
BODY MASS INDEX: 22.02 KG/M2 | WEIGHT: 137 LBS | RESPIRATION RATE: 17 BRPM | OXYGEN SATURATION: 98 % | TEMPERATURE: 98.2 F | HEIGHT: 66 IN | HEART RATE: 78 BPM | SYSTOLIC BLOOD PRESSURE: 126 MMHG | DIASTOLIC BLOOD PRESSURE: 82 MMHG

## 2024-09-09 DIAGNOSIS — I80.8 SUPERFICIAL THROMBOPHLEBITIS OF BOTH UPPER EXTREMITIES: Primary | ICD-10-CM

## 2024-09-09 PROCEDURE — 99214 OFFICE O/P EST MOD 30 MIN: CPT | Performed by: PHYSICIAN ASSISTANT

## 2024-09-09 NOTE — PROGRESS NOTES
Hematology/Oncology Outpatient Follow-up  Lisa M Gitlin 54 y.o. female 1970 [id number]    Date:  9/9/2024      Assessment and Plan:    Patient has recurrent superficial thrombophlebitis provoked from multiple IVs from recent hospitalizations  She states she is only been doing warm compress maybe once a day.  Reviewed that she needs to do warm compress multiple times a day, every few hours, and keep on the area for 15 to 20 minutes at a session.  Suggested obtaining a heating pad that is a smaller size with built-in Velcro so that she can attach to the arm and to leave there.  She has an upcoming colectomy surgery, surgeon could consider giving prophylactic Lovenox postoperatively; patient states that this is the plan per her knowledge, reviewed to review with surgeon again, surgery is at [facility].    Follow-up as needed.      HPI:  52 year old female presents for follow up for superficial thrombophlebitis.      Patient was in the OR with Dr. Lewis on 9/30/22 for an arthroscopic shoulder repair, left shoulder.      She was seen in the ED on 10/15/22 at [facility]. She was dx with occlusive thrombus in the basilic vein of the right forearm extending into the hand in the superficial venous systm.   No evidence of DVT in the arm.       She states she was having symptoms in the right arm for 2 weeks before going to the ED.  She states her IV was placed in the right arm for her surgery. She had a lot of pain at time of insertion of this IV. IV was left in by staff though.      She was discharged from ED on Xarelto.      She came back to the ED on 10/19/22 at [facility] due to continued pain the in the arm.   No changes, was advised to continue on Xarelto.      She is UTD with mammogram, colonoscopy, and gyn exam. She also sees derm once a year.     Interval history: Recently she has been admitted 3 times at [facility] for diverticulitis.  She will be going to the OR at the end of the month for a partial colectomy of  sigmoid colon due to her significant diverticulitis.  During her admission she had up to 4 IVs in her arm at once.  She had history of superficial thrombophlebitis of the right upper extremity for which she was seen previously as above, now this has affected her left upper extremity more.  A Doppler showed chronic findings of these thromboses    ROS: Review of Systems   Constitutional:  Negative for appetite change, chills, fatigue, fever and unexpected weight change.   HENT:  Negative for mouth sores and nosebleeds.    Respiratory:  Negative for cough and shortness of breath.    Cardiovascular:  Negative for chest pain, palpitations and leg swelling.   Gastrointestinal:  Negative for abdominal pain, blood in stool, constipation, diarrhea, nausea and vomiting.   Genitourinary:  Negative for difficulty urinating, dysuria and hematuria.   Musculoskeletal:  Negative for arthralgias.   Skin: Negative.    Neurological:  Negative for dizziness, weakness, light-headedness, numbness and headaches.   Hematological: Negative.    Psychiatric/Behavioral: Negative.         Past Medical History:   Diagnosis Date    Anxiety     During Menopause    Atrial fibrillation (HCC)     Bradycardia     COVID 06/2022    Diverticulitis     Elevated liver enzymes     GERD (gastroesophageal reflux disease)     Hyperlipidemia     Hypotension     Irregular heart beat     Migraines     Pneumonia     age 8    PONV (postoperative nausea and vomiting)     SVT (supraventricular tachycardia)        Past Surgical History:   Procedure Laterality Date    AUGMENTATION BREAST      CARDIOVERSION  2020    CHOLECYSTECTOMY      COLONOSCOPY      KNEE ARTHROSCOPY Right     LAPAROSCOPIC ENDOMETRIOSIS FULGURATION      GA DIAGNOSTIC ARTHROSCOPY SHOULDER +- SYNOVIAL BX Left 9/30/2022    Procedure: SHOULDER ARTHROSCOPY EXTENSIVE DEBRIDEMENT;  Surgeon: Tim Lewis MD;  Location: [facility] MAIN OR;  Service: Orthopedics    GA SURGICAL ARTHROSCOPY SHOULDER  BICEPS TENODESIS Right 1/15/2021    Procedure: SHOULDER ARTHROSCOPY WITH ARTHROSCOPIC BICEPS TENODESIS, ROTATOR CUFF REPAIR; POSTERIOR LABRAL REPAIR, SAD;  Surgeon: Tim Lewis MD;  Location: [facility]  MAIN OR;  Service: Orthopedics    SHOULDER ARTHROSCOPY W/ LABRAL REPAIR Bilateral     SHOULDER SURGERY Right     TONSILLECTOMY AND ADENOIDECTOMY      UPPER GASTROINTESTINAL ENDOSCOPY         Social History     Socioeconomic History    Marital status:      Spouse name: Not on file    Number of children: Not on file    Years of education: Not on file    Highest education level: Not on file   Occupational History    Not on file   Tobacco Use    Smoking status: Never    Smokeless tobacco: Never   Vaping Use    Vaping status: Never Used   Substance and Sexual Activity    Alcohol use: Not Currently    Drug use: Never    Sexual activity: Not on file   Other Topics Concern    Not on file   Social History Narrative    Not on file     Social Determinants of Health     Financial Resource Strain: Low Risk  (8/9/2024)    Received from [facility]    Overall Financial Resource Strain (CARDIA)     Difficulty of Paying Living Expenses: Not hard at all   Food Insecurity: No Food Insecurity (8/9/2024)    Received from [facility]    Hunger Vital Sign     Worried About Running Out of Food in the Last Year: Never true     Ran Out of Food in the Last Year: Never true   Transportation Needs: No Transportation Needs (8/9/2024)    Received from [facility]    PRAPARE - Transportation     Lack of Transportation (Medical): No     Lack of Transportation (Non-Medical): No   Physical Activity: Not on file   Stress: Not on file   Social Connections: Not on file   Intimate Partner Violence: Not At Risk (8/9/2024)    Received from [facility]    Humiliation, Afraid, Rape, and Kick questionnaire     Fear of Current or Ex-Partner: No     Emotionally Abused: No     Physically  "Abused: No     Sexually Abused: No   Housing Stability: Low Risk  (8/9/2024)    Received from [facility]    Housing Stability Vital Sign     Unable to Pay for Housing in the Last Year: No     Number of Times Moved in the Last Year: 1     Homeless in the Last Year: No       Family History   Problem Relation Age of Onset    Hypertension Mother     Glaucoma Mother     Glaucoma Father     Hypertension Father        Allergies   Allergen Reactions    Prednisone Other (See Comments)     \"AFIB\"    Macrobid [Nitrofurantoin] Hives    Other Hives     \"Cold Temperatures\"    Reglan [Metoclopramide] Syncope    Influenza Vaccines Abdominal Pain    Buspirone Other (See Comments)     Increased her anxiety    Codeine Other (See Comments) and Vomiting     Severe shaking    Compazine [Prochlorperazine] Anxiety and Vomiting    Darvon [Propoxyphene] Other (See Comments) and Vomiting     Severe Shaking    Demerol [Meperidine] Other (See Comments) and Vomiting     Severe Shaking    Morphine Other (See Comments) and Vomiting     Severe Shaking    Percocet [Oxycodone-Acetaminophen] Palpitations     shakiness           Current Outpatient Medications:     Ascorbic Acid (VITAMIN C PO), Take by mouth in the morning (Patient not taking: Reported on 3/29/2024), Disp: , Rfl:     atenolol (TENORMIN) 25 mg tablet, Take 12.5 mg by mouth daily at bedtime , Disp: , Rfl:     CALCIUM PO, Take by mouth daily (Patient not taking: Reported on 1/29/2024), Disp: , Rfl:     ciprofloxacin (CIPRO) 500 mg tablet, take 1 tablet by mouth every 12 hours for 7 days (Patient not taking: Reported on 6/6/2024), Disp: , Rfl:     clonazePAM (KlonoPIN) 0.5 mg tablet, Take 0.25 mg by mouth daily Takes for Migraines, Disp: , Rfl:     cycloSPORINE (RESTASIS) 0.05 % ophthalmic emulsion, , Disp: , Rfl:     ELDERBERRY PO, Take by mouth daily (Patient not taking: Reported on 3/8/2024), Disp: , Rfl:     estradiol (ESTRACE) 0.1 mg/g vaginal cream, 0.5 g vaginally " twice a week and apply sparingly to vulva daily (Patient not taking: Reported on 8/23/2024), Disp: , Rfl:     latanoprost (XALATAN) 0.005 % ophthalmic solution, INSTILL 1 DROP IN BOTH EYES IN THE EVENING, Disp: , Rfl:     Lumigan 0.01 % ophthalmic drops, , Disp: , Rfl:     Magnesium Gluconate (MAG-G PO), Take 120 mg by mouth in the morning (Patient not taking: Reported on 8/23/2024), Disp: , Rfl:     methocarbamol (ROBAXIN) 500 mg tablet, Start with 500 mg daily, Disp: 60 tablet, Rfl: 1    metroNIDAZOLE (FLAGYL) 375 MG capsule, Take 375 mg by mouth 2 (two) times a day (Patient not taking: Reported on 8/23/2024), Disp: , Rfl:     ondansetron (ZOFRAN) 4 mg tablet, Take 1 tablet (4 mg total) by mouth every 8 (eight) hours as needed for nausea or vomiting (Patient not taking: Reported on 8/23/2024), Disp: 20 tablet, Rfl: 0    ondansetron (ZOFRAN) 8 mg tablet, TAKE 1 TABLET BY MOUTH AT 10AM AND 6PM THE DAY BEFORE SURGERY, Disp: , Rfl:     polyethylene glycol (GLYCOLAX) 17 GM/SCOOP powder,  GRAMS IN 2 LITERS OF GATORADE/WATER/CRYSTAL LITE. DRINK 1 GLASS EVERY 15 MINUTES THE DAY BEFORE SURGERY BETWEEN 12PM-3PM., Disp: , Rfl:     Polyethylene Glycol POWD, The day before your surgery starting at 12 pm and finishing at 3 pm. Mix Miralax 8.3 oz (238 g) into 2 liters of Gatorade or water or Crystal Lite. Drink one 8 oz glass every 15 minutes (about 8 glasses)., Disp: , Rfl:     rimegepant sulfate (NURTEC) 75 mg TBDP, Take 1 tablet (75 mg total) by mouth daily as needed (migraine headaches) (Patient not taking: Reported on 6/6/2024), Disp: 8 tablet, Rfl: 3    valACYclovir (Valtrex) 500 mg tablet, take 1 tablet by oral route 3 times daily for 7 days (Patient not taking: Reported on 8/1/2024), Disp: , Rfl:     VITAMIN D, CHOLECALCIFEROL, PO, Take by mouth in the morning, Disp: , Rfl:     Xarelto 20 MG tablet, , Disp: , Rfl:       Physical Exam:  There were no vitals taken for this visit.    Physical Exam  Vitals reviewed.    Constitutional:       General: She is not in acute distress.     Appearance: She is well-developed. She is not ill-appearing.   HENT:      Head: Normocephalic and atraumatic.   Eyes:      General: No scleral icterus.     Conjunctiva/sclera: Conjunctivae normal.   Cardiovascular:      Rate and Rhythm: Normal rate and regular rhythm.      Heart sounds: Normal heart sounds. No murmur heard.  Pulmonary:      Effort: Pulmonary effort is normal. No respiratory distress.      Breath sounds: Normal breath sounds.   Abdominal:      Palpations: Abdomen is soft.      Tenderness: There is no abdominal tenderness.   Musculoskeletal:         General: No tenderness. Normal range of motion.      Cervical back: Normal range of motion and neck supple.      Right lower leg: No edema.      Left lower leg: No edema.      Comments: Palpable superficial thromboses of the RUE    Lymphadenopathy:      Cervical: No cervical adenopathy.   Skin:     General: Skin is warm and dry.   Neurological:      Mental Status: She is alert and oriented to person, place, and time.      Cranial Nerves: No cranial nerve deficit.   Psychiatric:         Mood and Affect: Mood normal.         Behavior: Behavior normal.           Labs:  Lab Results   Component Value Date    WBC 5.39 04/02/2024    HGB 14.4 04/02/2024    HCT 43.6 04/02/2024    MCV 93 04/02/2024     04/02/2024       I have spent 30 minutes with Patient  today in which greater than 50% of this time was spent in counseling/coordination of care regarding Diagnostic results, Risks and benefits of tx options, Instructions for management, Impressions, Documenting in the medical record, Reviewing / ordering tests, medicine, procedures  , and Obtaining or reviewing history  .     Patient voiced understanding and agreement in the above discussion. Aware to contact our office with questions/symptoms in the interim.     This note has been generated by voice recognition software system.  Therefore,  there may be spelling, grammar, and or syntax errors. Please contact if questions arise.

## 2024-09-10 DIAGNOSIS — I82.890 SUPERFICIAL VEIN THROMBOSIS: Primary | ICD-10-CM

## 2024-09-11 ENCOUNTER — NURSE TRIAGE (OUTPATIENT)
Age: 54
End: 2024-09-11

## 2024-09-11 DIAGNOSIS — G43.109 MIGRAINE WITH AURA AND WITHOUT STATUS MIGRAINOSUS, NOT INTRACTABLE: Primary | ICD-10-CM

## 2024-09-11 NOTE — TELEPHONE ENCOUNTER
Pt c/o migraine started Thursday. She has been taking nurtec as needed. Last time she took it was this morning around 0600 but it does not seem to help. She had about 3 auras since she took the nurtec. She took tylenol prn few days ago and it does help in the back of her neck and helps her HA a little.    Pt states that she used to have just 1 migraine every 2 months. But since Thursday of last week, she had 2.   Pt states that she has upcoming surgery in 2 weeks. She did not start robaxin yet bec she wasn't sure if she has to wait until after surgery. She wants to know if she can take tylenol prn and start robaxin at bedtime?    Answer Assessment - Initial Assessment Questions  When did migraine start? 3 days, off and on    Location/Description: Back and side of her head. Back side of her neck tightens up.     Pain scale: 7    Associated symptoms: auras    Precipitating factors: possibly stress due to upcoming surgery in 2 weeks    Alleviating factors: nurtec prn. Tylenol prn    Current migraine medications are confirmed as:  Nurtec 75 mg prn as ordered  She has not started the robaxin yet.    Protocols used: Headache-ADULT-[place]    Ok to leave a detailed message

## 2024-09-12 RX ORDER — DEXAMETHASONE 2 MG/1
TABLET ORAL
Qty: 16 TABLET | Refills: 0 | Status: SHIPPED | OUTPATIENT
Start: 2024-09-12

## 2024-09-12 RX ORDER — KETOROLAC TROMETHAMINE 10 MG/1
10 TABLET, FILM COATED ORAL EVERY 6 HOURS PRN
Qty: 10 TABLET | Refills: 0 | Status: SHIPPED | OUTPATIENT
Start: 2024-09-12

## 2024-09-12 NOTE — TELEPHONE ENCOUNTER
I would recommend that she should try the migraine cocktail. If the headaches are improving she should not use them as she has an up coming GI surgery.     If the headaches are still there, she can use the following.     1. Migraine with aura and without status migrainosus, not intractable    - ketorolac (TORADOL) 10 mg tablet; Take 1 tablet (10 mg total) by mouth every 6 (six) hours as needed for moderate pain or severe pain  Dispense: 10 tablet; Refill: 0  - dexamethasone (DECADRON) 2 mg tablet; Take 4 mg (2 tabs) for 2 days, then decrease it to 2 mg (1 tab) for 2 days, then decrease it to 1 mg (0.5 tab) for 2 days and then stop the medicine.  Dispense: 16 tablet; Refill: 0        Breanna Christianson MD.   Staff Neurologist  09/12/24   8:56 AM

## 2024-09-13 ENCOUNTER — OFFICE VISIT (OUTPATIENT)
Dept: PAIN MEDICINE | Facility: CLINIC | Age: 54
End: 2024-09-13
Payer: COMMERCIAL

## 2024-09-13 VITALS
SYSTOLIC BLOOD PRESSURE: 132 MMHG | HEIGHT: 66 IN | HEART RATE: 69 BPM | BODY MASS INDEX: 21.95 KG/M2 | WEIGHT: 136.6 LBS | DIASTOLIC BLOOD PRESSURE: 83 MMHG

## 2024-09-13 DIAGNOSIS — M54.16 LUMBAR RADICULOPATHY: Primary | ICD-10-CM

## 2024-09-13 DIAGNOSIS — M48.061 LUMBAR FORAMINAL STENOSIS: ICD-10-CM

## 2024-09-13 PROCEDURE — 99214 OFFICE O/P EST MOD 30 MIN: CPT | Performed by: ANESTHESIOLOGY

## 2024-09-13 NOTE — PROGRESS NOTES
Assessment  1. Lumbar radiculopathy    2. Lumbar foraminal stenosis        Plan  54-year-old female returning for follow-up of lumbosacral back pain that radiates into the hips and buttocks with associated numbness and paresthesias in the posterolateral aspect of the lower extremities from the knees to the feet.  MRI of the lumbar spine demonstrates foraminal stenosis at L4-5 and L5-S1 bilaterally and left foraminal stenosis at L3-4.  EMG of the lower extremities was normal.  The patient did try gabapentin at bedtime which unfortunately caused insomnia.  Tizanidine caused anxiety and this was discontinued.  She does take Tylenol as needed with good relief.  Of note, the patient does have an upcoming colon resection secondary to diverticulosis.  The patient symptoms may be secondary to lumbar radiculopathy stemming from foraminal stenosis.    1.  May consider bilateral L5 TFESI, however the patient does feel that her symptoms are slightly improving and she would like to hold off on this for now which I think is reasonable considering pending surgery  2.  Patient can retry gabapentin if symptoms worsen and take this in the morning rather than at bedtime  3.  Patient may take Tylenol 500 to 1000 mg every 8 hours as needed  4.  Patient will continue with HEP  5.  I will follow-up with the patient in 8 weeks      My impressions and treatment recommendations were discussed in detail with the patient who verbalized understanding and had no further questions.  Discharge instructions were provided. I personally saw and examined the patient and I agree with the above discussed plan of care.    No orders of the defined types were placed in this encounter.    No orders of the defined types were placed in this encounter.      History of Present Illness    Lisa M Gitlin is a 54 y.o. female returning for follow-up of lumbosacral back pain that radiates into the hips and buttocks with associated numbness and paresthesias in the  posterolateral aspect of the lower extremities from the knees to the feet.  She denies any bladder or bowel incontinence, saddle anesthesia, or lower extremity weakness.  MRI of the lumbar spine demonstrates foraminal stenosis at L4-5 and L5-S1 bilaterally and left foraminal stenosis at L3-4.  EMG of the lower extremities was normal.  The patient did try gabapentin at bedtime which unfortunately caused insomnia.  Tizanidine caused anxiety and this was discontinued.  She does take Tylenol as needed with good relief.  Of note, the patient does have an upcoming colon resection secondary to diverticulosis.  The patient rates her pain a 2 out of 10 and the pain is worse in the morning and evening.  The pain is constant and described as numbness and pins-and-needles.  The pain is increased with standing, walking, and exercise.  The pain is alleviated with sitting, relaxation, and lying down.    Other than as stated above, the patient denies any interval changes in medications, medical condition, mental condition, symptoms, or allergies since the last office visit.    I have personally reviewed and/or updated the patient's past medical history, past surgical history, family history, social history, current medications, allergies, and vital signs today.     Review of Systems   Constitutional:  Negative for fever and unexpected weight change.   HENT:  Negative for trouble swallowing.    Eyes:  Negative for visual disturbance.   Respiratory:  Negative for shortness of breath and wheezing.    Cardiovascular:  Negative for chest pain and palpitations.   Gastrointestinal:  Negative for constipation, diarrhea, nausea and vomiting.   Endocrine: Negative for cold intolerance, heat intolerance and polydipsia.   Genitourinary:  Negative for difficulty urinating and frequency.   Musculoskeletal:  Negative for arthralgias, gait problem, joint swelling and myalgias.   Skin:  Negative for rash.   Neurological:  Negative for dizziness,  seizures, syncope, weakness and headaches.   Hematological:  Does not bruise/bleed easily.   Psychiatric/Behavioral:  Negative for dysphoric mood.    All other systems reviewed and are negative.      Patient Active Problem List   Diagnosis    Subluxation of tendon of long head of biceps, right shoulder    PONV (postoperative nausea and vomiting)    Atrial fibrillation (HCC)    Hyperlipidemia    Gastroesophageal reflux disease    Anxiety    SVT (supraventricular tachycardia)    Aftercare following surgery of the musculoskeletal system    Tear of right supraspinatus tendon    Esophageal dysphagia    Right lower quadrant abdominal pain    Internal derangement of left shoulder    Rotator cuff strain, left, subsequent encounter    Migraines    Cervical spondylosis    Neck pain    Myofascial pain    Numbness       Past Medical History:   Diagnosis Date    Anxiety     During Menopause    Atrial fibrillation (HCC)     Bradycardia     COVID 06/2022    Diverticulitis     Elevated liver enzymes     GERD (gastroesophageal reflux disease)     Hyperlipidemia     Hypotension     Irregular heart beat     Migraines     Pneumonia     age 8    PONV (postoperative nausea and vomiting)     SVT (supraventricular tachycardia)        Past Surgical History:   Procedure Laterality Date    AUGMENTATION BREAST      CARDIOVERSION  2020    CHOLECYSTECTOMY      COLONOSCOPY      KNEE ARTHROSCOPY Right     LAPAROSCOPIC ENDOMETRIOSIS FULGURATION      AL DIAGNOSTIC ARTHROSCOPY SHOULDER +- SYNOVIAL BX Left 9/30/2022    Procedure: SHOULDER ARTHROSCOPY EXTENSIVE DEBRIDEMENT;  Surgeon: Tim Lewis MD;  Location: [facility];  Service: Orthopedics    AL SURGICAL ARTHROSCOPY SHOULDER BICEPS TENODESIS Right 1/15/2021    Procedure: SHOULDER ARTHROSCOPY WITH ARTHROSCOPIC BICEPS TENODESIS, ROTATOR CUFF REPAIR; POSTERIOR LABRAL REPAIR, SAD;  Surgeon: Tim Lewis MD;  Location: [facility];  Service: Orthopedics    SHOULDER ARTHROSCOPY W/  LABRAL REPAIR Bilateral     SHOULDER SURGERY Right     TONSILLECTOMY AND ADENOIDECTOMY      UPPER GASTROINTESTINAL ENDOSCOPY         Family History   Problem Relation Age of Onset    Hypertension Mother     Glaucoma Mother     Glaucoma Father     Hypertension Father        Social History     Occupational History    Not on file   Tobacco Use    Smoking status: Never    Smokeless tobacco: Never   Vaping Use    Vaping status: Never Used   Substance and Sexual Activity    Alcohol use: Not Currently    Drug use: Never    Sexual activity: Not on file       Current Outpatient Medications on File Prior to Visit   Medication Sig    Ascorbic Acid (VITAMIN C PO) Take by mouth in the morning (Patient not taking: Reported on 3/29/2024)    atenolol (TENORMIN) 25 mg tablet Take 12.5 mg by mouth daily at bedtime     CALCIUM PO Take by mouth daily (Patient not taking: Reported on 1/29/2024)    ciprofloxacin (CIPRO) 500 mg tablet take 1 tablet by mouth every 12 hours for 7 days (Patient not taking: Reported on 6/6/2024)    clonazePAM (KlonoPIN) 0.5 mg tablet Take 0.25 mg by mouth daily Takes for Migraines    cycloSPORINE (RESTASIS) 0.05 % ophthalmic emulsion  (Patient not taking: Reported on 3/29/2024)    dexamethasone (DECADRON) 2 mg tablet Take 4 mg (2 tabs) for 2 days, then decrease it to 2 mg (1 tab) for 2 days, then decrease it to 1 mg (0.5 tab) for 2 days and then stop the medicine.    ELDERBERRY PO Take by mouth daily (Patient not taking: Reported on 3/8/2024)    estradiol (ESTRACE) 0.1 mg/g vaginal cream 0.5 g vaginally twice a week and apply sparingly to vulva daily (Patient not taking: Reported on 8/23/2024)    ketorolac (TORADOL) 10 mg tablet Take 1 tablet (10 mg total) by mouth every 6 (six) hours as needed for moderate pain or severe pain    latanoprost (XALATAN) 0.005 % ophthalmic solution INSTILL 1 DROP IN BOTH EYES IN THE EVENING    Lumigan 0.01 % ophthalmic drops  (Patient not taking: Reported on 3/29/2024)     "Magnesium Gluconate (MAG-G PO) Take 120 mg by mouth in the morning (Patient not taking: Reported on 8/23/2024)    methocarbamol (ROBAXIN) 500 mg tablet Start with 500 mg daily (Patient not taking: Reported on 9/9/2024)    metroNIDAZOLE (FLAGYL) 375 MG capsule Take 375 mg by mouth 2 (two) times a day (Patient not taking: Reported on 8/23/2024)    ondansetron (ZOFRAN) 4 mg tablet Take 1 tablet (4 mg total) by mouth every 8 (eight) hours as needed for nausea or vomiting (Patient not taking: Reported on 8/23/2024)    ondansetron (ZOFRAN) 8 mg tablet TAKE 1 TABLET BY MOUTH AT 10AM AND 6PM THE DAY BEFORE SURGERY    polyethylene glycol (GLYCOLAX) 17 GM/SCOOP powder  GRAMS IN 2 LITERS OF GATORADE/WATER/CRYSTAL LITE. DRINK 1 GLASS EVERY 15 MINUTES THE DAY BEFORE SURGERY BETWEEN 12PM-3PM.    Polyethylene Glycol POWD The day before your surgery starting at 12 pm and finishing at 3 pm. Mix Miralax 8.3 oz (238 g) into 2 liters of Gatorade or water or Crystal Lite. Drink one 8 oz glass every 15 minutes (about 8 glasses).    Ubrogepant (UBRELVY) 50 MG tablet Take 1 tablet (50 mg) one time as needed for migraine. May repeat one additional tablet (50 mg) at least two hours after the first dose. Do not use more than two doses per day, or for more than eight days per month.    valACYclovir (Valtrex) 500 mg tablet take 1 tablet by oral route 3 times daily for 7 days (Patient not taking: Reported on 8/1/2024)    VITAMIN D, CHOLECALCIFEROL, PO Take by mouth in the morning    Xarelto 20 MG tablet  (Patient not taking: Reported on 1/29/2024)    [DISCONTINUED] rimegepant sulfate (NURTEC) 75 mg TBDP Take 1 tablet (75 mg total) by mouth daily as needed (migraine headaches) (Patient not taking: Reported on 6/6/2024)     No current facility-administered medications on file prior to visit.       Allergies   Allergen Reactions    Prednisone Other (See Comments)     \"AFIB\"    Macrobid [Nitrofurantoin] Hives    Other Hives     \"Cold " "Temperatures\"    Reglan [Metoclopramide] Syncope    Influenza Vaccines Abdominal Pain    Buspirone Other (See Comments)     Increased her anxiety    Codeine Other (See Comments) and Vomiting     Severe shaking    Compazine [Prochlorperazine] Anxiety and Vomiting    Darvon [Propoxyphene] Other (See Comments) and Vomiting     Severe Shaking    Demerol [Meperidine] Other (See Comments) and Vomiting     Severe Shaking    Morphine Other (See Comments) and Vomiting     Severe Shaking    Percocet [Oxycodone-Acetaminophen] Palpitations     shakiness         Physical Exam    /83   Pulse 69   Ht 5' 6\" (1.676 m)   Wt 62 kg (136 lb 9.6 oz)   BMI 22.05 kg/m²     Constitutional: normal, well developed, well nourished, alert, in no distress and non-toxic and no overt pain behavior.  Eyes: anicteric  HEENT: grossly intact  Neck: supple, symmetric, trachea midline and no masses   Pulmonary:even and unlabored  Cardiovascular:No edema or pitting edema present  Skin:Normal without rashes or lesions and well hydrated  Psychiatric:Mood and affect appropriate  Neurologic:Cranial Nerves II-XII grossly intact  Musculoskeletal:normal gait.  Bilateral lumbar paraspinals mildly tender to palpation from L4-S1.  Bilateral SI joints nontender to palpation.  Bilateral trochanteric flares nontender to palpation.  Bilateral patellar and Achilles reflexes were 2/4 and symmetrical.  No clonus was noted bilaterally.  Bilateral lower extremity strength was 5/5 in all muscle groups.  Sensation intact to light touch in L3 thru S1 dermatomes bilaterally.  Negative straight leg raise bilaterally.  Negative Henry's and Gaenslen's test bilaterally.    Imaging    Impression    IMPRESSION:    1.  Levoscoliosis.  2.  Chronic L5 left pars defect.  3.  Multilevel degenerative changes as discussed above.                    Workstation:[id number]  Narrative    MRI MR spine without contrast    CLINICAL INFORMATION: Back pain.    TECHNIQUE: Multiplanar, " multisequence MRI lumbar spine without contrast was  performed.    COMPARISON: 05/01/2023.    FINDINGS:    Marrow and alignment:    The lumbar vertebrae are numbered from caudal to cranial direction, starting  from the most inferior lumbar type vertebra numbered as L5 to L1.    Spinal curvature, left apex at L2-3. No acute fracture or dislocation. Chronic  L5 left pars defect.    Multilevel degenerative changes including disc desiccation, osteophytes, and  facet arthropathy. Disc bulges at L3-4, L4-5, L5-S1. Moderate to severe loss of  disc heights and degenerative endplate changes at T9-10, T10-11.    T9-10: Mild to moderate left foraminal stenosis.  T10-11: Disc bulge. Right annular fissure. Mild spinal canal stenosis. Mild left  foraminal stenosis.  T11-12: No spinal canal or foraminal stenosis.  T12-L1: No spinal canal or foraminal stenosis.  L1-2: No spinal canal or foraminal stenosis.  L2-3: No spinal canal or foraminal stenosis.  L3-4: No spinal canal stenosis. Mild left foraminal stenosis.  L4-5: No spinal canal stenosis. Mild bilateral foraminal stenosis.  L5-S1: No spinal canal stenosis. Mild bilateral foraminal stenosis.    CSF/spinal cord: The conus medullaris terminates at L1-2 level. No definite  abnormal signal intensity in the lower cord/conus.    Paraspinal soft tissues: Normal.  Exam End: 07/17/24  6:50 AM    Specimen Collected: 07/17/24  8:09 AM Last Resulted: 07/17/24  8:16 AM   Received From: [facility]  Result Received: 07/18/24  4:51 PM

## 2024-09-13 NOTE — TELEPHONE ENCOUNTER
Spoke with pt and advised her of Dr. Christianson's response and recommendations.    Pt says she would prefer not to take the dexamethasone. The last time she took Prednisone it gave her heart palpitations. Worried about taking steroids.    Pt says she gets the HA in the occipital nerve region so her neck tightens up. Has been using the the Tylenol Arthritis ER. Takes 2 tabs and this seems to help the neck and shoulders.She has Robaxin on hand but not sure if she can take this and has not tried it yet.    Pt asking if there are other alternatives for an abortive medication. Says she tried Nurtec and it didn't work and in fact made her HA worse. Says Ubrelvy worked better however, was not able to afford copay cost.    Pt also expresses some interest in learning more about Botox. Where do they inject that for HA's? A couple of her friends get this treatment and it seems to help but when it wears off, the HA comes back. Pt says she is not sure if she has the kind HA's that Botox would help. Pt also not sure if insurance would cover this.    Dr. Christianson - several things to advise:    - Pt asking if ok to take the Tylenol as needed and can she start the Robaxin.    - Pt inquiring about a different abortive medication.    - Pt interested in learning more about Botox. Would pt need another appt to discuss or can you send a [employer] message with information.    Please advise.    Best  [phone], ok to leave detailed message.

## 2024-09-13 NOTE — TELEPHONE ENCOUNTER
TELEPHONE ENCOUNTER:      I called Ms. Gitlin on their phone number listed in EPIC at 12:01 PM.     It seems that the patient had 2 headaches this week secondary to the stress from her work and from her upcoming surgery in the next few weeks.  She mentioned that she took Nurtec which made her headaches a lot worse she also feels that her neck and shoulder muscles are tensed up.  She has been taking Tylenol (arthritis) which seem to help with her headaches a couple of times.     She is asking whether it is safe to take the Tylenol with Robaxin and I told her that it should be fine as long as she keeps the Tylenol but not more than 4 times a day.  I also told her that it should be fine to use the Toradol but only with food and that to not more than 2 times a day.     I will also tried [facility] [place] to see if they would help her get approved for Ubrelvy as previously the cost of medication was too high.  If the Ubrelvy is not approved then we can definitely try the Qulipta for episodic migraine headaches.  We will try it at 10 mg every day.    I also discussed with the patient about trigger point injections and I told her that the Botox is mostly used for chronic migraine headaches and she might not be a candidate for the Botox as she only gets 1 or 2 headaches a week.  I also explained to her the procedure of the Botox.     All questions were answered, patient expressed understanding.       Breanna Christianson MD.   Staff Neurologist  09/13/24   12:01 PM

## 2024-10-22 NOTE — PROGRESS NOTES
Daily Note     Today's date: 7/3/2023  Patient name: Mary Milner  : 1970  MRN: [id number]  Referring provider: Carolina Kim DO  Dx:   Encounter Diagnosis     ICD-10-CM    1. Acute bilateral low back pain without sciatica  M54.50           Start Time: 1730  Stop Time:   Total time in clinic (min): 48 minutes    Subjective: Pt reports left lateral knee pain this evening. Objective: See treatment diary below      Assessment: Tolerated treatment well. Initiated POC focusing on R knee strengthening and motor control. Patient exhibited good technique with therapeutic exercises and would benefit from continued PT. Plan: Continue per plan of care. Precautions: A-fib, Labral repair , SVT, s/p L shoulder arthroscopy , Superficial Blood clot     * HOLD L/S intervention. Initiate knee next session. *  Manuals  7/3     CPA L/S  Gr III 8' Gr III 8'  Gr III 8'  Gr III 8'  Gr III 8       B UPA L/S             Neutral Gap  Gr III   Uncomfortable         IASTM using LLL       nv to lateral joint line and ITB  4:20     Neuro Re-Ed             Bridge   10x 10x 20x 2x10 2x10       Bridge with march  3x5 3x5 2x5 2x5 10x       SLR Abd TrA             Clam shell   btb 2x10          Prone hip extension  10x on ea 2x10 ea           Side bridge              Quad hip extension             Prone alt UE/LE lifts     2x10         LE lowering from 90/90 positions     10x on ea leg  10x on ea leg        Sciatic nerve slider     10x on ea LE        TKE at [facility]       Ther Ex             Bicycle         7' L 3      Elliptical  5' 5' 5' 10' 5' 5'       Side glides  10x on ea 10x on ea          LTR c/ PB  20x ea  20x 20x 10x        Press up  2x10 2x10 2x10 2x10 2x10       Prayer stretch   10x10"  10x10" 10x10"         Knee extension machine       nv 3x10 33#     Leg curl       nv  3x10 33#     SLS on foam        nv 3x10" B     Standing hamstring stretch on step 10x10" B      Ther Activity             Prone planks   10x5"          Anti-rotation press    10x blk tb  10x ea direction blk tb Stir the pot   10x ea direction blk tb Stir the pot        Hip hinge with dowel    15x with dowel and cueing  RDLs 15# KB 2x10 RDLs 15# KB 2x10       Lateral walks      4x20 ft rtb        Leg press        nv 3x10 70#     RDLs       nv 20# KB 3x10      Squat with hip abd       nv      TRX reverse lunge        nv 2x10                  Gait Training                                       Modalities                                       1:1 with PT from [id number]

## 2024-12-27 ENCOUNTER — OFFICE VISIT (OUTPATIENT)
Age: 54
End: 2024-12-27
Payer: COMMERCIAL

## 2024-12-27 VITALS
HEIGHT: 66 IN | OXYGEN SATURATION: 99 % | HEART RATE: 65 BPM | WEIGHT: 136 LBS | BODY MASS INDEX: 21.86 KG/M2 | SYSTOLIC BLOOD PRESSURE: 110 MMHG | DIASTOLIC BLOOD PRESSURE: 66 MMHG

## 2024-12-27 DIAGNOSIS — R20.2 NUMBNESS AND TINGLING: ICD-10-CM

## 2024-12-27 DIAGNOSIS — G43.109 MIGRAINE WITH AURA AND WITHOUT STATUS MIGRAINOSUS, NOT INTRACTABLE: Primary | ICD-10-CM

## 2024-12-27 DIAGNOSIS — R20.0 NUMBNESS AND TINGLING: ICD-10-CM

## 2024-12-27 PROCEDURE — 99215 OFFICE O/P EST HI 40 MIN: CPT | Performed by: PSYCHIATRY & NEUROLOGY

## 2024-12-27 RX ORDER — GALCANEZUMAB 120 MG/ML
INJECTION, SOLUTION SUBCUTANEOUS
Qty: 2 ML | Refills: 6 | Status: SHIPPED | OUTPATIENT
Start: 2024-12-27

## 2024-12-27 RX ORDER — SUMATRIPTAN 5 MG/1
1 SPRAY NASAL EVERY 2 HOUR PRN
Qty: 1 ACT | Refills: 1 | Status: SHIPPED | OUTPATIENT
Start: 2024-12-27
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
## 2024-12-27 NOTE — ASSESSMENT & PLAN NOTE
Ms. Gitlin is a very pleasant 54 y.o. female presenting with migraine headaches follow up. Due to her recent robotic sigmoidectomy would recommend Emgality injection for her migraine headaches, which are also associated with bilateral occipital neurologia.  Discussed about the role of bilateral occipital nerve block but the patient would like to hold off to that.  I would also try Imitrex as the Ubrelvy did not help with her headaches and Nurtec was not approved by her insurance    Orders:    Galcanezumab-gnlm (Emgality) 120 MG/ML SOAJ; Take 240 mg as the loading dose and then 120 mg injection every 28 days    SUMAtriptan (Imitrex) 5 MG/ACT nasal spray; 1 spray (5 mg total) into each nostril every 2 (two) hours as needed for migraine

## 2025-01-06 ENCOUNTER — PATIENT MESSAGE (OUTPATIENT)
Age: 55
End: 2025-01-06

## 2025-01-09 ENCOUNTER — TELEPHONE (OUTPATIENT)
Age: 55
End: 2025-01-09

## 2025-01-09 NOTE — TELEPHONE ENCOUNTER
January 6, 2025  Lisa M Gitlin to [facility] (supporting Breanna Christianson MD)         1/6/25  6:55 PM    Hi Dr. Christianson,     My insurance will not approve the injection for the migraines. They said it’s too expensive and will not approve.  Is there coupons for this? I looked on good RX and a few of those and the price is $650 a shot. It’s $1300 for the first month and that’s with the discount.      Sincerely,     Lisa Gitlin

## 2025-01-09 NOTE — TELEPHONE ENCOUNTER
Emgality - has a program for a copay card; however the insurance must approve the medication in order to apply for this program.      Pt's insurance did not approve Emgality.     Called [facility]. Spoke w/Rubia.  She advised Emgality does require a PA. She provided the following insurance info:    ID: [id number]  BIN: [id number]  PCN: [id number]  GRP: [id number]    [phone]    Will submit PA for Emgality.

## 2025-02-06 NOTE — TELEPHONE ENCOUNTER
PA Key: [id number] for Emgality was never submitted on [facility].        Submitted this AM. Waiting on determination.

## 2025-02-10 NOTE — TELEPHONE ENCOUNTER
Emgality approved from 2/6/25 - 8/6/25    Called pharmacy, made them aware of PA approval. Pharmacy tech states medication was filled and picked up on 2/8/25 with a $35 copay.

## 2025-06-02 ENCOUNTER — TELEPHONE (OUTPATIENT)
Age: 55
End: 2025-06-02

## 2025-06-03 ENCOUNTER — OFFICE VISIT (OUTPATIENT)
Age: 55
End: 2025-06-03
Payer: COMMERCIAL

## 2025-06-03 VITALS
HEART RATE: 73 BPM | BODY MASS INDEX: 21.69 KG/M2 | WEIGHT: 135 LBS | DIASTOLIC BLOOD PRESSURE: 58 MMHG | HEIGHT: 66 IN | SYSTOLIC BLOOD PRESSURE: 108 MMHG | OXYGEN SATURATION: 98 %

## 2025-06-03 DIAGNOSIS — G62.9 PERIPHERAL NEUROPATHY: ICD-10-CM

## 2025-06-03 DIAGNOSIS — R20.0 NUMBNESS AND TINGLING: ICD-10-CM

## 2025-06-03 DIAGNOSIS — G43.109 MIGRAINE WITH AURA AND WITHOUT STATUS MIGRAINOSUS, NOT INTRACTABLE: Primary | ICD-10-CM

## 2025-06-03 DIAGNOSIS — M51.379 DDD (DEGENERATIVE DISC DISEASE), LUMBOSACRAL: ICD-10-CM

## 2025-06-03 DIAGNOSIS — K57.92 DIVERTICULITIS: ICD-10-CM

## 2025-06-03 DIAGNOSIS — R20.2 NUMBNESS AND TINGLING: ICD-10-CM

## 2025-06-03 PROCEDURE — 99215 OFFICE O/P EST HI 40 MIN: CPT | Performed by: PSYCHIATRY & NEUROLOGY

## 2025-06-03 RX ORDER — EZETIMIBE 10 MG/1
1 TABLET ORAL DAILY
COMMUNITY
Start: 2025-03-18

## 2025-06-03 RX ORDER — FAMOTIDINE 20 MG/1
20 TABLET, FILM COATED ORAL
COMMUNITY
Start: 2025-04-25

## 2025-06-03 RX ORDER — DICYCLOMINE HCL 20 MG
20 TABLET ORAL EVERY 6 HOURS
COMMUNITY
Start: 2025-05-14 | End: 2025-06-13
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
## 2025-06-04 NOTE — ASSESSMENT & PLAN NOTE
Ms. Gitlin is a very pleasant 55 y.o. female presenting with migraine headaches follow up. Migraines have improved over the past 2-3 months. Last episode managed effectively with Ubrelvy. Concerns about gastrointestinal side effects of Emgality, hence not initiated. Considering Nurtec, which bypasses the gastrointestinal tract, and Botox for muscle relaxation and migraine alleviation.    - Prescribe Nurtec and send to preferred pharmacy  - Consider Botox treatment for migraines after June 12th  - Schedule trigger point injections post-June 12th    Orders:    rimegepant sulfate (NURTEC) 75 mg TBDP; Take 1 tablet (75 mg total) by mouth daily as needed (migraine headaches)    Nerve Stimulator (Nerivio) EMMANUEL; Use 45 min every day for acute and prevention of headaches.

## 2025-06-11 ENCOUNTER — DOCUMENTATION (OUTPATIENT)
Dept: OTHER | Facility: HOSPITAL | Age: 55
End: 2025-06-11

## 2025-07-02 ENCOUNTER — TELEPHONE (OUTPATIENT)
Age: 55
End: 2025-07-02

## 2025-07-02 NOTE — TELEPHONE ENCOUNTER
PA for Nurtec 75mg TBDP SUBMITTED to [employer]    via    [x]CMM-KEY: [id number]  []Surescripts-Case ID #   []Availity-Auth ID # NDC #   []Faxed to plan   []Other website   []Phone call Case ID #     [x]PA sent as URGENT    All office notes, labs and other pertaining documents and studies sent. Clinical questions answered. Awaiting determination from insurance company.     Turnaround time for your insurance to make a decision on your Prior Authorization can take 7-21 business days.

## 2025-07-03 NOTE — TELEPHONE ENCOUNTER
PA for Nurtec 75mgTBDP APPROVED     Date(s) approved 07/02/2025-01/01/2026      Patient advised by          []MyChart Message  []Phone call   [x]LMOM  []L/M to call office as no active Communication consent on file  []Unable to leave detailed message as VM not approved on Communication consent       Pharmacy advised by    [x]Fax  []Phone call  []Secure Chat    Specialty Pharmacy    []     Approval letter scanned into Media Yes

## (undated) DEVICE — INTENDED FOR TISSUE SEPARATION, AND OTHER PROCEDURES THAT REQUIRE A SHARP SURGICAL BLADE TO PUNCTURE OR CUT.: Brand: BARD-PARKER SAFETY BLADES SIZE 11, STERILE

## (undated) DEVICE — ADHESIVE SKIN HIGH VISCOSITY EXOFIN 1ML

## (undated) DEVICE — CHLORAPREP HI-LITE 26ML ORANGE

## (undated) DEVICE — GLOVE INDICATOR PI UNDERGLOVE SZ 7.5 BLUE

## (undated) DEVICE — VAPR COOLPULSE 90 ELECTRODE 90 DEGREES SUCTION WITH INTEGRATED HANDPIECE: Brand: VAPR COOLPULSE

## (undated) DEVICE — DISPOSABLE EQUIPMENT COVER: Brand: SMALL TOWEL DRAPE

## (undated) DEVICE — SHOULDER SUSPENSION KIT 6 PER BOX

## (undated) DEVICE — PACK PBDS SHOULDER ARTHROSCOPY RF

## (undated) DEVICE — INTENDED FOR TISSUE SEPARATION, AND OTHER PROCEDURES THAT REQUIRE A SHARP SURGICAL BLADE TO PUNCTURE OR CUT.: Brand: BARD-PARKER ® CARBON RIB-BACK BLADES

## (undated) DEVICE — GLOVE SRG BIOGEL 7.5

## (undated) DEVICE — 3M™ IOBAN™ 2 ANTIMICROBIAL INCISE DRAPE 6650EZ: Brand: IOBAN™ 2

## (undated) DEVICE — TUBING ARTHROSCOPY REDUCE PATIENT

## (undated) DEVICE — TUBING ARTHROSCOPY REDUCE PUMP

## (undated) DEVICE — DRESSING MEPILEX AG BORDER 4 X 4 IN

## (undated) DEVICE — SUT MONOCRYL 4-0 PS-2 18 IN Y496G

## (undated) DEVICE — EXPRESSEW III SUTURE NEEDLE FOR USE WITH EXPRESSEW II OR III SUTURE PASSER: Brand: EXPRESSEW

## (undated) DEVICE — BLADE SHAVER DISSECTOR 4MM 13CM COOLCUT

## (undated) DEVICE — GLOVE SRG BIOGEL ECLIPSE 7

## (undated) DEVICE — SPONGE PVP SCRUB WING STERILE

## (undated) DEVICE — BLADE SHAVER DISSECTOR 3.5MM 13CM COOLCUT

## (undated) DEVICE — THREADED CLEAR CANNULA WITH OBTURATOR 7MM X 75MM

## (undated) DEVICE — LIGHT HANDLE COVER SLEEVE DISP BLUE STELLAR

## (undated) DEVICE — THREADED CLEAR CANNULA WITH OBTURATOR 8.5MM X 75MM

## (undated) DEVICE — TUBING SUCTION 5MM X 12 FT